# Patient Record
Sex: FEMALE | Race: WHITE | Employment: FULL TIME | URBAN - METROPOLITAN AREA
[De-identification: names, ages, dates, MRNs, and addresses within clinical notes are randomized per-mention and may not be internally consistent; named-entity substitution may affect disease eponyms.]

---

## 2017-03-17 ENCOUNTER — HOSPITAL ENCOUNTER (OUTPATIENT)
Dept: MRI IMAGING | Age: 47
Discharge: HOME | End: 2017-03-17
Attending: INTERNAL MEDICINE
Payer: COMMERCIAL

## 2017-03-17 DIAGNOSIS — R10.2 CHRONIC PELVIC PAIN IN FEMALE: ICD-10-CM

## 2017-03-17 DIAGNOSIS — G89.29 CHRONIC PELVIC PAIN IN FEMALE: ICD-10-CM

## 2017-03-17 DIAGNOSIS — M53.3 SACRAL PAIN: ICD-10-CM

## 2017-03-17 PROCEDURE — 72195 MRI PELVIS W/O DYE: CPT

## 2017-03-20 NOTE — PROGRESS NOTES
Spoke to patient, message relayed and understanding was expressed. Patient states that she is feeling the same, no better and no worse.

## 2017-03-31 ENCOUNTER — HOSPITAL ENCOUNTER (OUTPATIENT)
Dept: MAMMOGRAPHY | Age: 47
Discharge: HOME | End: 2017-03-31
Attending: FAMILY MEDICINE
Payer: COMMERCIAL

## 2017-03-31 DIAGNOSIS — Z12.31 VISIT FOR SCREENING MAMMOGRAM: ICD-10-CM

## 2017-03-31 PROCEDURE — 77067 SCR MAMMO BI INCL CAD: CPT

## 2017-04-04 ENCOUNTER — HOSPITAL ENCOUNTER (OUTPATIENT)
Dept: PHYSICAL THERAPY | Age: 47
Discharge: HOME | End: 2017-04-04

## 2017-04-04 NOTE — PROGRESS NOTES
[facility]rapy [facility] at [facility]   [address], [facility] [address]  [phone]   [phone]    OUTPATIENT DAILY NOTE    NAME/AGE/GENDER: Amee Barney is a 48 y.o. female. DATE: 4/4/2017    Patient did not show for initial appointment today.      Faith Tony, PT

## 2018-03-06 ENCOUNTER — HOSPITAL ENCOUNTER (OUTPATIENT)
Dept: CT IMAGING | Age: 48
Discharge: HOME | End: 2018-03-06
Attending: INTERNAL MEDICINE
Payer: SELF-PAY

## 2018-03-06 DIAGNOSIS — Z91.89 CARDIOVASCULAR RISK FACTOR: ICD-10-CM

## 2018-03-06 PROCEDURE — 75571 CT HRT W/O DYE W/CA TEST: CPT

## 2018-03-06 NOTE — PROGRESS NOTES
Coronary calcium score is 0-good news. She does have 2 nodules or spots 3 mm and 2 mm of the right and left upper lobes of the lung-repeat CT in a year. She should quit tobacco is she smokes?

## 2018-03-06 NOTE — PROGRESS NOTES
Spoke to patient, message was relayed and understanding expressed. Patient states that she is aware of the spots, previously seen and was told that they may be from scar tissue from having several vouts of pneumonia.

## 2018-04-02 PROBLEM — R94.31 ABNORMAL EKG: Status: ACTIVE | Noted: 2018-04-02

## 2018-04-02 PROBLEM — R06.02 SHORTNESS OF BREATH: Status: ACTIVE | Noted: 2018-04-02

## 2018-09-19 ENCOUNTER — HOSPITAL ENCOUNTER (OUTPATIENT)
Dept: MAMMOGRAPHY | Age: 48
Discharge: HOME | End: 2018-09-19
Attending: OBSTETRICS & GYNECOLOGY
Payer: COMMERCIAL

## 2018-09-19 DIAGNOSIS — Z12.31 VISIT FOR SCREENING MAMMOGRAM: ICD-10-CM

## 2018-09-19 PROCEDURE — 77063 BREAST TOMOSYNTHESIS BI: CPT

## 2019-03-27 ENCOUNTER — HOSPITAL ENCOUNTER (OUTPATIENT)
Dept: CT IMAGING | Age: 49
Discharge: HOME | End: 2019-03-27
Attending: INTERNAL MEDICINE
Payer: COMMERCIAL

## 2019-03-27 DIAGNOSIS — R91.8 PULMONARY NODULES: ICD-10-CM

## 2019-03-27 PROCEDURE — 71250 CT THORAX DX C-: CPT

## 2019-03-27 NOTE — PROGRESS NOTES
CT scan shows lung spot is good-no concerns. A small kidney stone was seen of the left kidney. No concerns for stone however.

## 2019-04-05 ENCOUNTER — HOSPITAL ENCOUNTER (OUTPATIENT)
Dept: SURGERY | Age: 49
Discharge: HOME | End: 2019-04-05

## 2019-04-05 VITALS — BODY MASS INDEX: 31.18 KG/M2 | WEIGHT: 176 LBS | HEIGHT: 63 IN

## 2019-04-05 NOTE — PERIOP NOTES
Patient verified name, , and procedure. Type: 1a; abbreviated assessment per anesthesia guidelines  Labs per surgeon: none  Labs per anesthesia: none    Instructed pt that they will be notified via office/GI LAB for time of arrival to GI lab. Follow diet and prep instructions per office including NPO status. If patient has NOT received instructions from office patient is advised to call surgeon office, verbalizes understanding. Bath or shower the night before and the am of surgery with non-mositurizing soap. No lotions, oils, powders, cologne on skin. No make up, eye make up or jewelry. Wear loose fitting comfortable, clean clothing. Must have adult present in building the entire time . Medications for the day of procedure- cymbalta, prilosec and ativan if needed, patient to hold- diclofenac. The following discharge instructions reviewed with patient: medication given during procedure may cause drowsiness for several hours, therefore, do not drive or operate machinery for remainder of the day. You may not drink alcohol on the day of your procedure, please resume regular diet and activity unless otherwise directed. You may experience abdominal distention for several hours that is relieved by the passage of gas. Contact your physician if you have any of the following: fever or chills, severe abdominal pain or excessive amount of bleeding or a large amount when having a bowel movement.  Occasional specks of blood with bowel movement would not be unusual.

## 2019-04-10 ENCOUNTER — ANESTHESIA EVENT (OUTPATIENT)
Dept: ENDOSCOPY | Age: 49
End: 2019-04-10
Payer: COMMERCIAL

## 2019-04-10 RX ORDER — SODIUM CHLORIDE, SODIUM LACTATE, POTASSIUM CHLORIDE, CALCIUM CHLORIDE 600; 310; 30; 20 MG/100ML; MG/100ML; MG/100ML; MG/100ML
100 INJECTION, SOLUTION INTRAVENOUS CONTINUOUS
Status: CANCELLED | OUTPATIENT
Start: 2019-04-10

## 2019-04-11 ENCOUNTER — ANESTHESIA (OUTPATIENT)
Dept: ENDOSCOPY | Age: 49
End: 2019-04-11
Payer: COMMERCIAL

## 2019-04-11 ENCOUNTER — HOSPITAL ENCOUNTER (OUTPATIENT)
Age: 49
Setting detail: OUTPATIENT SURGERY
Discharge: HOME | End: 2019-04-11
Attending: SURGERY | Admitting: SURGERY
Payer: COMMERCIAL

## 2019-04-11 VITALS
HEIGHT: 63 IN | DIASTOLIC BLOOD PRESSURE: 60 MMHG | OXYGEN SATURATION: 98 % | HEART RATE: 64 BPM | SYSTOLIC BLOOD PRESSURE: 108 MMHG | RESPIRATION RATE: 15 BRPM | TEMPERATURE: 97.8 F | WEIGHT: 176.6 LBS | BODY MASS INDEX: 31.29 KG/M2

## 2019-04-11 PROBLEM — K21.9 GASTROESOPHAGEAL REFLUX DISEASE WITHOUT ESOPHAGITIS: Status: ACTIVE | Noted: 2019-04-11

## 2019-04-11 PROBLEM — Z12.11 ENCOUNTER FOR SCREENING COLONOSCOPY: Status: ACTIVE | Noted: 2019-04-11

## 2019-04-11 LAB — HCG UR QL: NEGATIVE

## 2019-04-11 PROCEDURE — 74011250637 HC RX REV CODE- 250/637: Performed by: ANESTHESIOLOGY

## 2019-04-11 PROCEDURE — 74011250636 HC RX REV CODE- 250/636: Performed by: ANESTHESIOLOGY

## 2019-04-11 PROCEDURE — 76040000025: Performed by: SURGERY

## 2019-04-11 PROCEDURE — 88312 SPECIAL STAINS GROUP 1: CPT

## 2019-04-11 PROCEDURE — 87081 CULTURE SCREEN ONLY: CPT

## 2019-04-11 PROCEDURE — 81025 URINE PREGNANCY TEST: CPT

## 2019-04-11 PROCEDURE — 88305 TISSUE EXAM BY PATHOLOGIST: CPT

## 2019-04-11 PROCEDURE — 77030009426 HC FCPS BIOP ENDOSC BSC -B: Performed by: SURGERY

## 2019-04-11 PROCEDURE — 76060000032 HC ANESTHESIA 0.5 TO 1 HR: Performed by: SURGERY

## 2019-04-11 PROCEDURE — 74011250636 HC RX REV CODE- 250/636

## 2019-04-11 RX ORDER — PROPOFOL 10 MG/ML
INJECTION, EMULSION INTRAVENOUS
Status: DISCONTINUED | OUTPATIENT
Start: 2019-04-11 | End: 2019-04-11 | Stop reason: HOSPADM

## 2019-04-11 RX ORDER — SODIUM CHLORIDE, SODIUM LACTATE, POTASSIUM CHLORIDE, CALCIUM CHLORIDE 600; 310; 30; 20 MG/100ML; MG/100ML; MG/100ML; MG/100ML
100 INJECTION, SOLUTION INTRAVENOUS CONTINUOUS
Status: DISCONTINUED | OUTPATIENT
Start: 2019-04-11 | End: 2019-04-11 | Stop reason: HOSPADM

## 2019-04-11 RX ORDER — FAMOTIDINE 20 MG/1
20 TABLET, FILM COATED ORAL AS NEEDED
Status: COMPLETED | OUTPATIENT
Start: 2019-04-11 | End: 2019-04-11

## 2019-04-11 RX ORDER — PROPOFOL 10 MG/ML
INJECTION, EMULSION INTRAVENOUS AS NEEDED
Status: DISCONTINUED | OUTPATIENT
Start: 2019-04-11 | End: 2019-04-11 | Stop reason: HOSPADM

## 2019-04-11 RX ADMIN — FAMOTIDINE 20 MG: 20 TABLET ORAL at 07:18

## 2019-04-11 RX ADMIN — SODIUM CHLORIDE, SODIUM LACTATE, POTASSIUM CHLORIDE, AND CALCIUM CHLORIDE 100 ML/HR: 600; 310; 30; 20 INJECTION, SOLUTION INTRAVENOUS at 07:24

## 2019-04-11 RX ADMIN — PROPOFOL 160 MCG/KG/MIN: 10 INJECTION, EMULSION INTRAVENOUS at 07:42

## 2019-04-11 RX ADMIN — SODIUM CHLORIDE, SODIUM LACTATE, POTASSIUM CHLORIDE, AND CALCIUM CHLORIDE: 600; 310; 30; 20 INJECTION, SOLUTION INTRAVENOUS at 07:37

## 2019-04-11 RX ADMIN — PROPOFOL 40 MG: 10 INJECTION, EMULSION INTRAVENOUS at 07:42

## 2019-04-11 RX ADMIN — PROPOFOL 20 MG: 10 INJECTION, EMULSION INTRAVENOUS at 07:49

## 2019-04-11 RX ADMIN — PROPOFOL 20 MG: 10 INJECTION, EMULSION INTRAVENOUS at 07:45

## 2019-04-11 NOTE — ANESTHESIA PREPROCEDURE EVALUATION
Anesthetic History No history of anesthetic complications Review of Systems / Medical History Patient summary reviewed, nursing notes reviewed and pertinent labs reviewed Pulmonary Within defined limits Neuro/Psych Within defined limits Cardiovascular Hypertension: well controlled Exercise tolerance: >4 METS 
  
GI/Hepatic/Renal 
  
GERD: well controlled Endo/Other Obesity Other Findings Comments: Breast mass Physical Exam 
 
Airway Mallampati: I 
TM Distance: 4 - 6 cm Neck ROM: normal range of motion Mouth opening: Normal 
 
 Cardiovascular Regular rate and rhythm,  S1 and S2 normal,  no murmur, click, rub, or gallop Rhythm: regular Rate: normal 
 
 
 
 Dental 
 
Dentition: Lower braces and Upper braces Pulmonary Breath sounds clear to auscultation Abdominal 
 
 
 
 Other Findings Anesthetic Plan ASA: 2 Anesthesia type: total IV anesthesia Induction: Intravenous Anesthetic plan and risks discussed with: Patient and Spouse

## 2019-04-11 NOTE — DISCHARGE INSTRUCTIONS
Gastrointestinal Esophagogastroduodenoscopy (EGD)  Upper Exam Discharge Instructions    1. Call your doctor for any problems or questions. 2. Contact the doctor's office for follow up appointment as directed. 3. Medication may cause drowsiness for several hours, therefore, do not drive or operate machinery for remainder of the day. 4. No alcohol today. 5. Ordinarily, you may resume regular diet and activity after exam unless otherwise specified by your physician. 6. For mild soreness in your throat you may use Cepacol throat lozenges or warm salt-water gargles as needed.

## 2019-04-11 NOTE — H&P
Fabrice Gonzalez 4/11/2019 Date of Admission:  4/11/2019 Subjective:  
 
Patient is a 46 y.o.  female presents for screening colonoscopy. The patient reports no problems. The patient denies family history of colon cancer. The patient has had a colonoscopy in the past. Her last colonoscopy was at [facility] 10 years ago for IBS. She states it was normal. Otherwise there is no reported rectal bleeding or melena. No changes in appetite or unusual wt loss. No abdominal pain or bloating. No reported changes in bowel habits. She has history of GERD for several years dating back to the loss of her son. She takes Prilosec which controls her symptoms. She complains of epigastric abdominal pain daily and thinks she may have an ulcer. She denies previous history of ulcers. Denies associated N/V, hematemesis or gastritis. Today we will add an EGD. Patient Active Problem List  
 Diagnosis Date Noted  Gastroesophageal reflux disease without esophagitis 04/11/2019  Encounter for screening colonoscopy 04/11/2019  Agatston coronary artery calcium score less than 100  Pulmonary nodules  Obesity, Class I, BMI 30-34.9  Abnormal EKG 04/02/2018  Shortness of breath 04/02/2018  Essential hypertension  BMI 29.0-29.9,adult  Sacral pain  Panic anxiety syndrome  Atypical ductal hyperplasia of breast 06/09/2014 Past Medical History:  
Diagnosis Date  Agatston coronary artery calcium score less than 100 03/2018 CAC 0  
 Chronic Sacral pain   
 managed with diclofenac and cymbalta  Essential hypertension Managed with meds  Family history of breast cancer  Obesity, Class I, BMI 30-34.9  Panic anxiety syndrome Ativan as needed  PUD (peptic ulcer disease)   
 managed with Prilosec  Pulmonary nodules 03/2018  
 3 mm right upper lobe nodule and 2 mm left upper lobe nodule. Per pt no growth.  Right breast biopsy 6/2014, 7/2014 Past Surgical History:  
Procedure Laterality Date  HX BREAST BIOPSY  2014  HX BREAST BIOPSY  2014 RIGHT BREAST NEEDLE LOCALIZED BIOPSY X2    performed by Jeffery Rayo MD at [address] [facility] HX HYSTEROSCOPY WITH ENDOMETRIAL ABLATION  2018 [address] [facility]  US GUIDED CORE BREAST BIOPSY Right 2014  
 6:00 position Fragments of fibroadenoma, simple  US GUIDED CORE BREAST BIOPSY Right 2014  
 7:00 pisition: Fibrocystic mastopathy having moderate intraductal hyperplasia, apocrine metaplasia and microcalcification. Prior to Admission Medications Prescriptions Last Dose Informant Patient Reported? Taking? DULoxetine (CYMBALTA) 60 mg capsule 4/10/2019 at Unknown time  No Yes Sig: Take 1 Cap by mouth daily. LORazepam (ATIVAN) 1 mg tablet 4/10/2019 at Unknown time  Yes Yes Sig: Take 1 mg by mouth daily as needed. Omeprazole delayed release (PRILOSEC D/R) 20 mg tablet 3/11/2019 at Unknown time  No Yes Sig: Take 1 Tab by mouth daily. diclofenac EC (VOLTAREN) 75 mg EC tablet 2019 at Unknown time  Yes Yes Sig: Take 75 mg by mouth daily. losartan-hydroCHLOROthiazide (HYZAAR) 50-12.5 mg per tablet 4/10/2019 at Unknown time  No Yes Sig: Take 1 Tab by mouth daily. Facility-Administered Medications: None Allergies Allergen Reactions  Citalopram Nausea and Vomiting  Bupropion Hcl Other (comments) Social History Tobacco Use  Smoking status: Former Smoker Packs/day: 0.50 Years: 6.00 Pack years: 3.00 Types: Cigarettes Last attempt to quit: 1999 Years since quittin.1  Smokeless tobacco: Never Used Substance Use Topics  Alcohol use: No  
  
Social History Social History Narrative 16 yr old son  in a MVA 2017. She has a 32 yr old daughter. She attends  [employer]. Family History Problem Relation Age of Onset  Heart Disease Father  Cancer Mother  Breast Cancer Mother 67  Breast Cancer Maternal Grandmother 80  Hypertension Brother  Breast Cancer Maternal Aunt 58 Current Facility-Administered Medications Medication Dose Route Frequency  lactated Ringers infusion  100 mL/hr IntraVENous CONTINUOUS Review of Systems A comprehensive review of systems was negative except for that written in the HPI. Objective:  
 
Vitals:  
 04/11/19 0705 BP: 147/80 Pulse: 80 Resp: 16 Temp: 97.8 °F (36.6 °C) SpO2: 98% Weight: 176 lb 9.6 oz (80.1 kg) Height: 5' 3\" (1.6 m) PHYSICAL EXAM  
Physical Examination: General appearance - alert, well appearing, and in no distress Mental status - alert, oriented to person, place, and time Eyes - pupils equal and reactive, extraocular eye movements intact Nose - normal and patent, no erythema, discharge or polyps Neck - supple, no significant adenopathy Chest - clear to auscultation, no wheezes, rales or rhonchi, symmetric air entry Heart - normal rate, regular rhythm, normal S1, S2, no murmurs, rubs, clicks or gallops Abdomen - soft, nontender, nondistended, no masses or organomegaly Neurological - alert, oriented, normal speech, no focal findings or movement disorder noted Musculoskeletal - no joint tenderness, deformity or swelling Extremities - peripheral pulses normal, no pedal edema, no clubbing or cyanosis Skin - normal coloration and turgor, no rashes, no suspicious skin lesions noted No results for input(s): WBC, HGB, HCT, PLT, HGBEXT, HCTEXT, PLTEXT in the last 72 hours. No results for input(s): NA, K, CL, GLU, CO2, BUN, CREA, MG, PHOS, TROIQ, INR, BNPP in the last 72 hours. No lab exists for component: TROIP, INREXT No results for input(s): PH, PCO2, PO2, HCO3 in the last 72 hours. Assessment:  
 
Hospital Problems  Date Reviewed: 4/11/2019 Codes Class Noted POA Gastroesophageal reflux disease without esophagitis ICD-10-CM: K21.9 ICD-9-CM: 530.81  4/11/2019 Unknown * (Principal) Encounter for screening colonoscopy ICD-10-CM: Z12.11 ICD-9-CM: V76.51  4/11/2019 Unknown Plan:  
Screening colonoscopy and EGD.  
 
 
Celia Browning, DO

## 2019-04-11 NOTE — PROCEDURES
Esophagogastroduodenoscopy Procedure Note      Patient: Maricarmen West MRN: [id number]     YOB: 1970  Age: 46 y.o. Sex: female           Procedure in Detail:  Informed consent was obtained for the procedure, the patient was brought to the endoscopy suite and sedation was induced by anesthesia. The [id number] gastroscope was inserted into the mouth and advanced under direct vision to second portion of the duodenum. A careful inspection was made as the gastroscope was withdrawn, including a retroflexed view of the proximal stomach; findings and interventions are described below, with appropriate photodocumentation obtained. Findings:   Oropharynx:   Normal  Esophagus:    Normal  Cardia of the stomach:    Normal  Body of the stomach:    Normal  Fundus of the stomach:    Normal  Antrum of the stomach:      - Excavated lesions:     - Erosions  Duodenum:    Normal  Anastomosis:      Normal        Therapies:    biopsy of stomach antrum    EBL-  minimal    Specimens: Specimens were collected and sent to pathology. Complications:   None; patient tolerated the procedure well. Recommendations:  - Continue acid suppression.  - Await pathology. - Await ARSENIO test result and treat for Helicobacter pylori if positive. - Follow symptoms. Signed by: Shelbi Browning DO                         April 11, 2019   Procedure in Detail:  Informed consent was obtained for the procedure. The patient was placed in the left lateral decubitus position and sedation was induced by anesthesia. The [id number] was inserted into the rectum and advanced under direct vision to the cecum, which was identified by the ileocecal valve and appendiceal orifice. The quality of the colonic preparation was excellent. A careful inspection was made as the colonoscope was withdrawn, including a retroflexed view of the rectum; findings and interventions are described below.   Appropriate photodocumentation was obtained. Findings:   ANUS: Anal exam reveals no masses or hemorrhoids, sphincter tone is normal.   RECTUM: Rectal exam reveals no masses or hemorrhoids. SIGMOID COLON: The mucosa is normal with good vascular pattern and without ulcers, diverticula, and polyps. DESCENDING COLON: The mucosa is normal with good vascular pattern and without ulcers, diverticula, and polyps. SPLENIC FLEXURE: The splenic flexure is normal.   TRANSVERSE COLON: The mucosa is normal with good vascular pattern and without ulcers, diverticula, and polyps. HEPATIC FLEXURE: The hepatic flexure is normal.   ASCENDING COLON: The mucosa is normal with good vascular pattern and without ulcers, diverticula, and polyps. CECUM: The appendiceal orifice appears normal. The ileocecal valve appears normal.   TERMINAL ILEUM: The terminal ileum was not entered. Specimens: No specimens were collected. Complications: None; patient tolerated the procedure well. \    EBL - minimal    Recommendations:   - For colon cancer screening in this average-risk patient, colonoscopy may be repeated in 10 years.      Signed By: Amor Forbes DO                        April 11, 2019

## 2019-04-11 NOTE — ANESTHESIA POSTPROCEDURE EVALUATION
Procedure(s): 
COLONOSCOPY/ 27 
ESOPHAGOGASTRODUODENOSCOPY (EGD) ESOPHAGOGASTRODUODENAL (EGD) BIOPSY. total IV anesthesia Anesthesia Post Evaluation Multimodal analgesia: multimodal analgesia not used between 6 hours prior to anesthesia start to PACU discharge Patient location during evaluation: bedside Patient participation: complete - patient participated Level of consciousness: awake and alert Pain management: adequate Airway patency: patent Anesthetic complications: no 
Cardiovascular status: hemodynamically stable Respiratory status: spontaneous ventilation Hydration status: euvolemic Comments: Patient stable and may discharge at this time. Vitals Value Taken Time /60 4/11/2019  8:33 AM  
Temp Pulse 64 4/11/2019  8:33 AM  
Resp 15 4/11/2019  8:33 AM  
SpO2 98 % 4/11/2019  8:33 AM

## 2019-04-12 LAB
H PYLORI AG TISS QL IMSTN: NEGATIVE
H PYLORI AG TISS QL IMSTN: NEGATIVE

## 2019-09-20 PROBLEM — Z12.11 ENCOUNTER FOR SCREENING COLONOSCOPY: Status: RESOLVED | Noted: 2019-04-11 | Resolved: 2019-09-20

## 2020-04-29 PROBLEM — R06.02 SHORTNESS OF BREATH: Status: RESOLVED | Noted: 2018-04-02 | Resolved: 2020-04-29

## 2020-10-22 PROBLEM — R94.31 ABNORMAL EKG: Status: RESOLVED | Noted: 2018-04-02 | Resolved: 2020-10-22

## 2020-12-12 ENCOUNTER — HOSPITAL ENCOUNTER (OUTPATIENT)
Dept: MAMMOGRAPHY | Age: 50
Discharge: HOME | End: 2020-12-12
Attending: OBSTETRICS & GYNECOLOGY
Payer: COMMERCIAL

## 2020-12-12 DIAGNOSIS — Z12.31 ENCOUNTER FOR SCREENING MAMMOGRAM FOR MALIGNANT NEOPLASM OF BREAST: ICD-10-CM

## 2020-12-12 PROCEDURE — 77063 BREAST TOMOSYNTHESIS BI: CPT

## 2020-12-14 ENCOUNTER — HOSPITAL ENCOUNTER (OUTPATIENT)
Dept: GENERAL RADIOLOGY | Age: 50
Discharge: HOME | End: 2020-12-14
Payer: COMMERCIAL

## 2020-12-14 ENCOUNTER — HOSPITAL ENCOUNTER (OUTPATIENT)
Dept: GENERAL RADIOLOGY | Age: 50
End: 2020-12-14
Payer: COMMERCIAL

## 2020-12-14 DIAGNOSIS — E55.9 HYPOVITAMINOSIS D: ICD-10-CM

## 2020-12-14 DIAGNOSIS — M53.3 SACRAL PAIN: ICD-10-CM

## 2020-12-14 DIAGNOSIS — R74.8 ELEVATED CK: ICD-10-CM

## 2020-12-14 DIAGNOSIS — R53.83 OTHER FATIGUE: ICD-10-CM

## 2020-12-14 DIAGNOSIS — M15.9 GENERALIZED OSTEOARTHRITIS: ICD-10-CM

## 2020-12-14 DIAGNOSIS — M79.10 MYALGIA: ICD-10-CM

## 2020-12-14 PROCEDURE — 72100 X-RAY EXAM L-S SPINE 2/3 VWS: CPT

## 2020-12-21 ENCOUNTER — HOSPITAL ENCOUNTER (OUTPATIENT)
Dept: MAMMOGRAPHY | Age: 50
Discharge: HOME | End: 2020-12-21
Attending: OBSTETRICS & GYNECOLOGY
Payer: COMMERCIAL

## 2020-12-21 DIAGNOSIS — R92.8 ABNORMAL SCREENING MAMMOGRAM: ICD-10-CM

## 2020-12-21 PROCEDURE — 77065 DX MAMMO INCL CAD UNI: CPT

## 2021-12-29 ENCOUNTER — TRANSCRIBE ORDER (OUTPATIENT)
Dept: SCHEDULING | Age: 51
End: 2021-12-29

## 2021-12-29 DIAGNOSIS — Z12.31 VISIT FOR SCREENING MAMMOGRAM: Primary | ICD-10-CM

## 2022-01-07 ENCOUNTER — HOSPITAL ENCOUNTER (OUTPATIENT)
Dept: ULTRASOUND IMAGING | Age: 52
Discharge: HOME | End: 2022-01-07
Attending: INTERNAL MEDICINE
Payer: COMMERCIAL

## 2022-01-07 DIAGNOSIS — R10.11 RIGHT UPPER QUADRANT ABDOMINAL PAIN: ICD-10-CM

## 2022-01-07 PROCEDURE — 76700 US EXAM ABDOM COMPLETE: CPT

## 2022-01-08 ENCOUNTER — HOSPITAL ENCOUNTER (OUTPATIENT)
Dept: MAMMOGRAPHY | Age: 52
Discharge: HOME | End: 2022-01-08
Attending: OBSTETRICS & GYNECOLOGY
Payer: COMMERCIAL

## 2022-01-08 DIAGNOSIS — Z12.31 VISIT FOR SCREENING MAMMOGRAM: ICD-10-CM

## 2022-01-08 PROCEDURE — 77063 BREAST TOMOSYNTHESIS BI: CPT

## 2022-01-10 NOTE — PROGRESS NOTES
Abdominal ultrasound report reviewed and shows a gallstone which could cause intermittent pain,  No definite evidence of inflammation however the imaging can be misleading.   If pain persists would recommend surgical consultation

## 2022-01-13 ENCOUNTER — HOSPITAL ENCOUNTER (OUTPATIENT)
Dept: MAMMOGRAPHY | Age: 52
Discharge: HOME | End: 2022-01-13
Attending: OBSTETRICS & GYNECOLOGY
Payer: COMMERCIAL

## 2022-01-13 DIAGNOSIS — R92.8 ABNORMAL SCREENING MAMMOGRAM: ICD-10-CM

## 2022-01-13 PROCEDURE — 77065 DX MAMMO INCL CAD UNI: CPT

## 2022-01-14 ENCOUNTER — HOSPITAL ENCOUNTER (OUTPATIENT)
Dept: MAMMOGRAPHY | Age: 52
Discharge: HOME | End: 2022-01-14
Attending: OBSTETRICS & GYNECOLOGY
Payer: COMMERCIAL

## 2022-01-14 VITALS
OXYGEN SATURATION: 97 % | TEMPERATURE: 98.2 F | SYSTOLIC BLOOD PRESSURE: 136 MMHG | HEART RATE: 75 BPM | DIASTOLIC BLOOD PRESSURE: 63 MMHG

## 2022-01-14 DIAGNOSIS — R92.8 ABNORMAL MAMMOGRAM OF LEFT BREAST: ICD-10-CM

## 2022-01-14 PROCEDURE — 19081 BX BREAST 1ST LESION STRTCTC: CPT

## 2022-01-14 PROCEDURE — 74011000250 HC RX REV CODE- 250: Performed by: OBSTETRICS & GYNECOLOGY

## 2022-01-14 PROCEDURE — 88305 TISSUE EXAM BY PATHOLOGIST: CPT

## 2022-01-14 PROCEDURE — 74011250636 HC RX REV CODE- 250/636: Performed by: OBSTETRICS & GYNECOLOGY

## 2022-01-14 PROCEDURE — 77065 DX MAMMO INCL CAD UNI: CPT

## 2022-01-14 RX ORDER — LIDOCAINE HYDROCHLORIDE AND EPINEPHRINE 10; 10 MG/ML; UG/ML
10 INJECTION, SOLUTION INFILTRATION; PERINEURAL
Status: COMPLETED | OUTPATIENT
Start: 2022-01-14 | End: 2022-01-14

## 2022-01-14 RX ORDER — LIDOCAINE HYDROCHLORIDE AND EPINEPHRINE 10; 10 MG/ML; UG/ML
5 INJECTION, SOLUTION INFILTRATION; PERINEURAL
Status: COMPLETED | OUTPATIENT
Start: 2022-01-14 | End: 2022-01-14

## 2022-01-14 RX ORDER — LIDOCAINE HYDROCHLORIDE 10 MG/ML
5 INJECTION INFILTRATION; PERINEURAL
Status: COMPLETED | OUTPATIENT
Start: 2022-01-14 | End: 2022-01-14

## 2022-01-14 RX ADMIN — LIDOCAINE HYDROCHLORIDE,EPINEPHRINE BITARTRATE 4 MG: 10; .01 INJECTION, SOLUTION INFILTRATION; PERINEURAL at 09:15

## 2022-01-14 RX ADMIN — SODIUM CHLORIDE 250 ML: 900 INJECTION, SOLUTION INTRAVENOUS at 09:15

## 2022-01-14 RX ADMIN — LIDOCAINE HYDROCHLORIDE 2 ML: 10 INJECTION, SOLUTION INFILTRATION; PERINEURAL at 09:15

## 2022-01-14 RX ADMIN — LIDOCAINE HYDROCHLORIDE,EPINEPHRINE BITARTRATE 100 MG: 10; .01 INJECTION, SOLUTION INFILTRATION; PERINEURAL at 09:15

## 2022-01-18 ENCOUNTER — HOSPITAL ENCOUNTER (OUTPATIENT)
Dept: MRI IMAGING | Age: 52
Discharge: HOME | End: 2022-01-18
Attending: OBSTETRICS & GYNECOLOGY
Payer: COMMERCIAL

## 2022-01-18 DIAGNOSIS — C50.912 BREAST CANCER, LEFT (HCC): ICD-10-CM

## 2022-01-18 PROCEDURE — A9576 INJ PROHANCE MULTIPACK: HCPCS

## 2022-01-18 PROCEDURE — 74011250636 HC RX REV CODE- 250/636

## 2022-01-18 PROCEDURE — 77049 MRI BREAST C-+ W/CAD BI: CPT

## 2022-01-18 RX ORDER — SODIUM CHLORIDE 0.9 % (FLUSH) 0.9 %
10 SYRINGE (ML) INJECTION
Status: COMPLETED | OUTPATIENT
Start: 2022-01-18 | End: 2022-01-18

## 2022-01-18 RX ADMIN — GADOTERIDOL 17 ML: 279.3 INJECTION, SOLUTION INTRAVENOUS at 15:33

## 2022-01-18 RX ADMIN — Medication 10 ML: at 15:33

## 2022-01-19 PROBLEM — F11.99 OPIOID USE, UNSPECIFIED WITH UNSPECIFIED OPIOID-INDUCED DISORDER (HCC): Status: ACTIVE | Noted: 2022-01-19

## 2022-02-08 ENCOUNTER — PATIENT OUTREACH (OUTPATIENT)
Dept: CASE MANAGEMENT | Age: 52
End: 2022-02-08

## 2022-02-08 NOTE — PROGRESS NOTES
2022 saw pt today with Dr Cris Galarza  for new breast cancer visit. Patient here with spouse. Patient's mom  from breast cancer and her aunt had breast cancer. Plan will be for surgery followed by re visit with Dr. Cris Galarza after surgery. Will have an Oncotype on surgery specimen once complete. Dr. Cris Galarza discussed potential plan of care with patient based on Oncotype results after surgery. Patient has already stopped Estridol , no hot flashes or other symptoms since stopping. Met with Genetics on 21. Plans for bilateral mastectomy with reconstruction  surgery with Dr. Lu Ferguson, yet to be scheduled. . Has critical insurance forms that she will drop off at desk. Will follow up with patient after surgery.

## 2022-02-15 ENCOUNTER — HOSPITAL ENCOUNTER (OUTPATIENT)
Dept: NUCLEAR MEDICINE | Age: 52
Discharge: HOME | End: 2022-02-15
Attending: INTERNAL MEDICINE
Payer: COMMERCIAL

## 2022-02-15 DIAGNOSIS — R10.11 RIGHT UPPER QUADRANT ABDOMINAL PAIN: ICD-10-CM

## 2022-02-15 PROCEDURE — 78227 HEPATOBIL SYST IMAGE W/DRUG: CPT

## 2022-02-15 RX ORDER — KIT FOR THE PREPARATION OF TECHNETIUM TC 99M MEBROFENIN 45 MG/10ML
6 INJECTION, POWDER, LYOPHILIZED, FOR SOLUTION INTRAVENOUS
Status: COMPLETED | OUTPATIENT
Start: 2022-02-15 | End: 2022-02-15

## 2022-02-15 RX ADMIN — KIT FOR THE PREPARATION OF TECHNETIUM TC 99M MEBROFENIN 6.58 MILLICURIE: 45 INJECTION, POWDER, LYOPHILIZED, FOR SOLUTION INTRAVENOUS at 08:35

## 2022-02-15 NOTE — PROGRESS NOTES
I called and notified the pt of these results/recommendations. The pt verbalized understanding and is agreeable to the referral.  The referral was placed.

## 2022-02-15 NOTE — PROGRESS NOTES
HIDA Scan report reviewed and is abnormal.   Recommend consultation with General Surgery.  Please inform the patient

## 2022-03-04 ENCOUNTER — HOSPITAL ENCOUNTER (OUTPATIENT)
Dept: SURGERY | Age: 52
Discharge: HOME | End: 2022-03-04

## 2022-03-04 NOTE — PERIOP NOTES
Patient was found to have a recent positive Covid-19 history, the below timetable is used for the earliest OR date for their elective procedure. Patient states had respiratory symptoms including cough and/or dyspnea, was not hospitalized and not in the ICU. The date of the test was 2/28/22. The earliest OR date is 4/12/22. Will have charge nurse follow up with Dr. Kurt Pederson office on Monday since office is closed this afternoon. Respiratory Symptoms Hospitalized Need for ICU Earliest OR Date   No No No 4 weeks from test date   Yes No No 6 weeks from test date   Yes Yes No 8 weeks from date of discharge   Yes Yes Yes 12 weeks from date of discharge       The patient chart will be reviewed by anesthesia and the surgeon office will be notified with the above recommendation.

## 2022-03-07 RX ORDER — CEFAZOLIN SODIUM 1 G/3ML
2-3 INJECTION, POWDER, FOR SOLUTION INTRAMUSCULAR; INTRAVENOUS
Status: CANCELLED | OUTPATIENT
Start: 2022-03-07 | End: 2022-03-07

## 2022-03-09 NOTE — PERIOP NOTES
Dr. Josef Evans reviewed chart - history of covid on 02/28/22. No order received.  Ok to proceed with breast surgery on 04/07/22

## 2022-03-19 PROBLEM — K21.9 GASTROESOPHAGEAL REFLUX DISEASE WITHOUT ESOPHAGITIS: Status: ACTIVE | Noted: 2019-04-11

## 2022-03-19 PROBLEM — F11.99 OPIOID USE, UNSPECIFIED WITH UNSPECIFIED OPIOID-INDUCED DISORDER (HCC): Status: ACTIVE | Noted: 2022-01-19

## 2022-03-25 VITALS — WEIGHT: 184 LBS | HEIGHT: 63 IN | BODY MASS INDEX: 32.6 KG/M2

## 2022-03-25 RX ORDER — CYCLOBENZAPRINE HCL 10 MG
10 TABLET ORAL
COMMUNITY
End: 2022-04-07

## 2022-03-25 NOTE — PERIOP NOTES
Patient verified name and . Order for consent was found in EHR and matches case posting; patient verifies procedure. Type 2 surgery, PAT phone assessment complete. Orders were received. Labs per surgeon: None  Labs per anesthesia protocol: CBC, BMP 2022 results found in Care Everywhere and HGB 13.6, K+ 3.7 and Cr 0.81 within anesthesia guidelines. Patient answered medical/surgical history questions at their best of ability. All prior to admission medications documented in [facility]. Patient instructed to take the following medications the day of surgery according to anesthesia guidelines with a small sip of water: Ativan (if needed) and Pristiq On the day before surgery please take Acetaminophen 1000mg in the morning and then again before bed. You may substitute for Tylenol 650 mg. Hold all vitamins 7 days prior to surgery and NSAIDS 5 days prior to surgery. Prescription meds to hold:None        Patient instructed on the following:    > Arrive at A Entrance, time of arrival to be called the day before by 1700  > NPO after midnight including gum, mints, and ice chips  > Responsible adult must drive patient to the hospital, stay during surgery, and patient will need supervision 24 hours after anesthesia  > Use antibacterial soap in shower the night before surgery and on the morning of surgery  > All piercings must be removed prior to arrival.    > Leave all valuables (money and jewelry) at home but bring insurance card and ID on DOS.   > You may be required to pay a deductible or co-pay on the day of your procedure. You can pre-pay by calling [phone] if your surgery is at the [facility] or [phone] if your surgery is at the [facility]. > Do not wear make-up, nail polish, lotions, cologne, perfumes, powders, or oil on skin. Artificial nails are not permitted.

## 2022-03-31 ENCOUNTER — HOSPITAL ENCOUNTER (OUTPATIENT)
Dept: SURGERY | Age: 52
Discharge: HOME | End: 2022-03-31

## 2022-04-07 ENCOUNTER — ANESTHESIA (OUTPATIENT)
Dept: SURGERY | Age: 52
End: 2022-04-07
Payer: COMMERCIAL

## 2022-04-07 ENCOUNTER — HOSPITAL ENCOUNTER (OUTPATIENT)
Age: 52
Setting detail: OUTPATIENT SURGERY
Discharge: HOME | End: 2022-04-07
Attending: SURGERY | Admitting: SURGERY
Payer: COMMERCIAL

## 2022-04-07 ENCOUNTER — APPOINTMENT (OUTPATIENT)
Dept: NUCLEAR MEDICINE | Age: 52
End: 2022-04-07
Attending: SURGERY
Payer: COMMERCIAL

## 2022-04-07 ENCOUNTER — ANESTHESIA EVENT (OUTPATIENT)
Dept: SURGERY | Age: 52
End: 2022-04-07
Payer: COMMERCIAL

## 2022-04-07 VITALS
WEIGHT: 185 LBS | HEIGHT: 63 IN | SYSTOLIC BLOOD PRESSURE: 113 MMHG | HEART RATE: 96 BPM | OXYGEN SATURATION: 96 % | TEMPERATURE: 98.1 F | DIASTOLIC BLOOD PRESSURE: 58 MMHG | RESPIRATION RATE: 16 BRPM | BODY MASS INDEX: 32.78 KG/M2

## 2022-04-07 DIAGNOSIS — C50.912 DUCTAL CARCINOMA OF BREAST, LEFT (HCC): ICD-10-CM

## 2022-04-07 DIAGNOSIS — Z80.3 FAMILY HISTORY OF BREAST CANCER: ICD-10-CM

## 2022-04-07 DIAGNOSIS — N60.99 ATYPICAL DUCTAL HYPERPLASIA OF BREAST: ICD-10-CM

## 2022-04-07 LAB — HGB BLD-MCNC: 12.3 G/DL (ref 11.7–15.4)

## 2022-04-07 PROCEDURE — 77030040361 HC SLV COMPR DVT MDII -B: Performed by: SURGERY

## 2022-04-07 PROCEDURE — 77030010512 HC APPL CLP LIG J&J -C: Performed by: SURGERY

## 2022-04-07 PROCEDURE — 74011250637 HC RX REV CODE- 250/637: Performed by: ANESTHESIOLOGY

## 2022-04-07 PROCEDURE — 74011250636 HC RX REV CODE- 250/636: Performed by: ANESTHESIOLOGY

## 2022-04-07 PROCEDURE — 85018 HEMOGLOBIN: CPT

## 2022-04-07 PROCEDURE — 74011000250 HC RX REV CODE- 250: Performed by: NURSE ANESTHETIST, CERTIFIED REGISTERED

## 2022-04-07 PROCEDURE — 77030011267 HC ELECTRD BLD COVD -A: Performed by: SURGERY

## 2022-04-07 PROCEDURE — 77030008459 HC STPLR SKN COOP -B: Performed by: SURGERY

## 2022-04-07 PROCEDURE — 77030008462 HC STPLR SKN PROX J&J -A: Performed by: SURGERY

## 2022-04-07 PROCEDURE — 77030040506 HC DRN WND MDII -A: Performed by: SURGERY

## 2022-04-07 PROCEDURE — 77030009845 HC ONTMNT BACITRCN PATT -A: Performed by: SURGERY

## 2022-04-07 PROCEDURE — 77030031304 HC WAVWGD EIGR DISP INVO -D: Performed by: SURGERY

## 2022-04-07 PROCEDURE — 77030039425 HC BLD LARYNG TRULITE DISP TELE -A: Performed by: ANESTHESIOLOGY

## 2022-04-07 PROCEDURE — 76210000021 HC REC RM PH II 0.5 TO 1 HR: Performed by: SURGERY

## 2022-04-07 PROCEDURE — 77030011266 HC ELECTRD BLD INSL COVD -A: Performed by: SURGERY

## 2022-04-07 PROCEDURE — 38525 BIOPSY/REMOVAL LYMPH NODES: CPT | Performed by: SURGERY

## 2022-04-07 PROCEDURE — 74011250636 HC RX REV CODE- 250/636: Performed by: NURSE ANESTHETIST, CERTIFIED REGISTERED

## 2022-04-07 PROCEDURE — 2709999900 HC NON-CHARGEABLE SUPPLY: Performed by: SURGERY

## 2022-04-07 PROCEDURE — 76010000136 HC OR TIME 4.5 TO 5 HR: Performed by: SURGERY

## 2022-04-07 PROCEDURE — 74011000250 HC RX REV CODE- 250: Performed by: SURGERY

## 2022-04-07 PROCEDURE — 74011000636 HC RX REV CODE- 636: Performed by: SURGERY

## 2022-04-07 PROCEDURE — 77030002996 HC SUT SLK J&J -A: Performed by: SURGERY

## 2022-04-07 PROCEDURE — 77030002933 HC SUT MCRYL J&J -A: Performed by: SURGERY

## 2022-04-07 PROCEDURE — 77030002966 HC SUT PDS J&J -A: Performed by: SURGERY

## 2022-04-07 PROCEDURE — 77030037088 HC TUBE ENDOTRACH ORAL NSL COVD-A: Performed by: ANESTHESIOLOGY

## 2022-04-07 PROCEDURE — C1889 IMPLANT/INSERT DEVICE, NOC: HCPCS | Performed by: SURGERY

## 2022-04-07 PROCEDURE — 74011250636 HC RX REV CODE- 250/636: Performed by: SURGERY

## 2022-04-07 PROCEDURE — 76060000040 HC ANESTHESIA 4.5 TO 5 HR: Performed by: SURGERY

## 2022-04-07 PROCEDURE — 74011000250 HC RX REV CODE- 250: Performed by: ANESTHESIOLOGY

## 2022-04-07 PROCEDURE — 19303 MAST SIMPLE COMPLETE: CPT | Performed by: SURGERY

## 2022-04-07 PROCEDURE — 77030002916 HC SUT ETHLN J&J -A: Performed by: SURGERY

## 2022-04-07 PROCEDURE — 77030040504 HC DRN WND MDII -B: Performed by: SURGERY

## 2022-04-07 PROCEDURE — 76210000006 HC OR PH I REC 0.5 TO 1 HR: Performed by: SURGERY

## 2022-04-07 PROCEDURE — 77030020269 HC MISC IMPL: Performed by: SURGERY

## 2022-04-07 PROCEDURE — A9541 TC99M SULFUR COLLOID: HCPCS

## 2022-04-07 PROCEDURE — 77030018836 HC SOL IRR NACL ICUM -A: Performed by: SURGERY

## 2022-04-07 PROCEDURE — C1789 PROSTHESIS, BREAST, IMP: HCPCS | Performed by: SURGERY

## 2022-04-07 PROCEDURE — 77030026227 HC FUNL KELLR 2 PCH ALGN -C: Performed by: SURGERY

## 2022-04-07 PROCEDURE — 77030040922 HC BLNKT HYPOTHRM STRY -A: Performed by: ANESTHESIOLOGY

## 2022-04-07 PROCEDURE — 88307 TISSUE EXAM BY PATHOLOGIST: CPT

## 2022-04-07 PROCEDURE — 88304 TISSUE EXAM BY PATHOLOGIST: CPT

## 2022-04-07 DEVICE — GRAFT HUM TISS W16XL20CM THK0.4-2.4MM REGEN TISS MTRX PERF: Type: IMPLANTABLE DEVICE | Site: BREAST | Status: FUNCTIONAL

## 2022-04-07 RX ORDER — ROCURONIUM BROMIDE 10 MG/ML
INJECTION, SOLUTION INTRAVENOUS AS NEEDED
Status: DISCONTINUED | OUTPATIENT
Start: 2022-04-07 | End: 2022-04-07 | Stop reason: HOSPADM

## 2022-04-07 RX ORDER — SODIUM CHLORIDE, SODIUM LACTATE, POTASSIUM CHLORIDE, CALCIUM CHLORIDE 600; 310; 30; 20 MG/100ML; MG/100ML; MG/100ML; MG/100ML
25 INJECTION, SOLUTION INTRAVENOUS CONTINUOUS
Status: DISCONTINUED | OUTPATIENT
Start: 2022-04-07 | End: 2022-04-07 | Stop reason: HOSPADM

## 2022-04-07 RX ORDER — PROCHLORPERAZINE EDISYLATE 5 MG/ML
2.5 INJECTION INTRAMUSCULAR; INTRAVENOUS
Status: DISCONTINUED | OUTPATIENT
Start: 2022-04-07 | End: 2022-04-07 | Stop reason: HOSPADM

## 2022-04-07 RX ORDER — ONDANSETRON 2 MG/ML
INJECTION INTRAMUSCULAR; INTRAVENOUS AS NEEDED
Status: DISCONTINUED | OUTPATIENT
Start: 2022-04-07 | End: 2022-04-07 | Stop reason: HOSPADM

## 2022-04-07 RX ORDER — SODIUM CHLORIDE, SODIUM LACTATE, POTASSIUM CHLORIDE, CALCIUM CHLORIDE 600; 310; 30; 20 MG/100ML; MG/100ML; MG/100ML; MG/100ML
100 INJECTION, SOLUTION INTRAVENOUS CONTINUOUS
Status: DISCONTINUED | OUTPATIENT
Start: 2022-04-07 | End: 2022-04-07 | Stop reason: HOSPADM

## 2022-04-07 RX ORDER — CEFAZOLIN SODIUM/WATER 2 G/20 ML
2 SYRINGE (ML) INTRAVENOUS ONCE
Status: COMPLETED | OUTPATIENT
Start: 2022-04-07 | End: 2022-04-07

## 2022-04-07 RX ORDER — GLYCOPYRROLATE 0.2 MG/ML
INJECTION INTRAMUSCULAR; INTRAVENOUS AS NEEDED
Status: DISCONTINUED | OUTPATIENT
Start: 2022-04-07 | End: 2022-04-07 | Stop reason: HOSPADM

## 2022-04-07 RX ORDER — METHYLENE BLUE 10 MG/ML
INJECTION INTRAVENOUS AS NEEDED
Status: DISCONTINUED | OUTPATIENT
Start: 2022-04-07 | End: 2022-04-07 | Stop reason: HOSPADM

## 2022-04-07 RX ORDER — KETAMINE HYDROCHLORIDE 10 MG/ML
10 INJECTION, SOLUTION INTRAMUSCULAR; INTRAVENOUS AS NEEDED
Status: DISCONTINUED | OUTPATIENT
Start: 2022-04-07 | End: 2022-04-07 | Stop reason: HOSPADM

## 2022-04-07 RX ORDER — DEXAMETHASONE SODIUM PHOSPHATE 4 MG/ML
INJECTION, SOLUTION INTRA-ARTICULAR; INTRALESIONAL; INTRAMUSCULAR; INTRAVENOUS; SOFT TISSUE AS NEEDED
Status: DISCONTINUED | OUTPATIENT
Start: 2022-04-07 | End: 2022-04-07 | Stop reason: HOSPADM

## 2022-04-07 RX ORDER — EPHEDRINE SULFATE/0.9% NACL/PF 50 MG/5 ML
SYRINGE (ML) INTRAVENOUS AS NEEDED
Status: DISCONTINUED | OUTPATIENT
Start: 2022-04-07 | End: 2022-04-07 | Stop reason: HOSPADM

## 2022-04-07 RX ORDER — PROPOFOL 10 MG/ML
INJECTION, EMULSION INTRAVENOUS AS NEEDED
Status: DISCONTINUED | OUTPATIENT
Start: 2022-04-07 | End: 2022-04-07 | Stop reason: HOSPADM

## 2022-04-07 RX ORDER — VANCOMYCIN HYDROCHLORIDE 1 G/20ML
INJECTION, POWDER, LYOPHILIZED, FOR SOLUTION INTRAVENOUS AS NEEDED
Status: DISCONTINUED | OUTPATIENT
Start: 2022-04-07 | End: 2022-04-07 | Stop reason: HOSPADM

## 2022-04-07 RX ORDER — LIDOCAINE HYDROCHLORIDE AND EPINEPHRINE 10; 10 MG/ML; UG/ML
INJECTION, SOLUTION INFILTRATION; PERINEURAL AS NEEDED
Status: DISCONTINUED | OUTPATIENT
Start: 2022-04-07 | End: 2022-04-07 | Stop reason: HOSPADM

## 2022-04-07 RX ORDER — GENTAMICIN SULFATE 40 MG/ML
INJECTION, SOLUTION INTRAMUSCULAR; INTRAVENOUS AS NEEDED
Status: DISCONTINUED | OUTPATIENT
Start: 2022-04-07 | End: 2022-04-07 | Stop reason: HOSPADM

## 2022-04-07 RX ORDER — FENTANYL CITRATE 50 UG/ML
INJECTION, SOLUTION INTRAMUSCULAR; INTRAVENOUS AS NEEDED
Status: DISCONTINUED | OUTPATIENT
Start: 2022-04-07 | End: 2022-04-07 | Stop reason: HOSPADM

## 2022-04-07 RX ORDER — HYDROMORPHONE HYDROCHLORIDE 2 MG/ML
0.5 INJECTION, SOLUTION INTRAMUSCULAR; INTRAVENOUS; SUBCUTANEOUS
Status: DISCONTINUED | OUTPATIENT
Start: 2022-04-07 | End: 2022-04-07 | Stop reason: HOSPADM

## 2022-04-07 RX ORDER — MIDAZOLAM HYDROCHLORIDE 1 MG/ML
2 INJECTION, SOLUTION INTRAMUSCULAR; INTRAVENOUS
Status: DISCONTINUED | OUTPATIENT
Start: 2022-04-07 | End: 2022-04-07 | Stop reason: HOSPADM

## 2022-04-07 RX ORDER — LIDOCAINE HYDROCHLORIDE 20 MG/ML
INJECTION, SOLUTION EPIDURAL; INFILTRATION; INTRACAUDAL; PERINEURAL AS NEEDED
Status: DISCONTINUED | OUTPATIENT
Start: 2022-04-07 | End: 2022-04-07 | Stop reason: HOSPADM

## 2022-04-07 RX ORDER — LIDOCAINE HYDROCHLORIDE 10 MG/ML
0.3 INJECTION INFILTRATION; PERINEURAL ONCE
Status: DISCONTINUED | OUTPATIENT
Start: 2022-04-07 | End: 2022-04-07 | Stop reason: HOSPADM

## 2022-04-07 RX ORDER — LIDOCAINE HYDROCHLORIDE 10 MG/ML
0.1 INJECTION INFILTRATION; PERINEURAL ONCE
Status: COMPLETED | OUTPATIENT
Start: 2022-04-07 | End: 2022-04-07

## 2022-04-07 RX ORDER — HEPARIN SODIUM 5000 [USP'U]/ML
5000 INJECTION, SOLUTION INTRAVENOUS; SUBCUTANEOUS ONCE
Status: COMPLETED | OUTPATIENT
Start: 2022-04-07 | End: 2022-04-07

## 2022-04-07 RX ORDER — NEOSTIGMINE METHYLSULFATE 1 MG/ML
INJECTION, SOLUTION INTRAVENOUS AS NEEDED
Status: DISCONTINUED | OUTPATIENT
Start: 2022-04-07 | End: 2022-04-07 | Stop reason: HOSPADM

## 2022-04-07 RX ORDER — OXYCODONE HYDROCHLORIDE 5 MG/1
10 TABLET ORAL
Status: COMPLETED | OUTPATIENT
Start: 2022-04-07 | End: 2022-04-07

## 2022-04-07 RX ORDER — BUPIVACAINE HYDROCHLORIDE 2.5 MG/ML
INJECTION, SOLUTION EPIDURAL; INFILTRATION; INTRACAUDAL AS NEEDED
Status: DISCONTINUED | OUTPATIENT
Start: 2022-04-07 | End: 2022-04-07 | Stop reason: HOSPADM

## 2022-04-07 RX ADMIN — ROCURONIUM BROMIDE 50 MG: 50 INJECTION, SOLUTION INTRAVENOUS at 13:22

## 2022-04-07 RX ADMIN — FENTANYL CITRATE 100 MCG: 50 INJECTION INTRAMUSCULAR; INTRAVENOUS at 13:22

## 2022-04-07 RX ADMIN — ONDANSETRON 4 MG: 2 INJECTION INTRAMUSCULAR; INTRAVENOUS at 13:31

## 2022-04-07 RX ADMIN — Medication 2 G: at 13:29

## 2022-04-07 RX ADMIN — Medication 10 MG: at 14:14

## 2022-04-07 RX ADMIN — Medication 10 MG: at 18:55

## 2022-04-07 RX ADMIN — HYDROMORPHONE HYDROCHLORIDE 0.5 MG: 2 INJECTION, SOLUTION INTRAMUSCULAR; INTRAVENOUS; SUBCUTANEOUS at 18:09

## 2022-04-07 RX ADMIN — FENTANYL CITRATE 50 MCG: 50 INJECTION INTRAMUSCULAR; INTRAVENOUS at 14:49

## 2022-04-07 RX ADMIN — PROPOFOL 200 MG: 10 INJECTION, EMULSION INTRAVENOUS at 13:22

## 2022-04-07 RX ADMIN — FENTANYL CITRATE 50 MCG: 50 INJECTION INTRAMUSCULAR; INTRAVENOUS at 14:03

## 2022-04-07 RX ADMIN — OXYCODONE 10 MG: 5 TABLET ORAL at 18:32

## 2022-04-07 RX ADMIN — Medication 10 MG: at 13:38

## 2022-04-07 RX ADMIN — LIDOCAINE HYDROCHLORIDE 0.1 ML: 10 INJECTION, SOLUTION INFILTRATION; PERINEURAL at 10:05

## 2022-04-07 RX ADMIN — Medication 10 MG: at 15:59

## 2022-04-07 RX ADMIN — SODIUM CHLORIDE, SODIUM LACTATE, POTASSIUM CHLORIDE, AND CALCIUM CHLORIDE: 600; 310; 30; 20 INJECTION, SOLUTION INTRAVENOUS at 13:13

## 2022-04-07 RX ADMIN — HYDROMORPHONE HYDROCHLORIDE 0.5 MG: 2 INJECTION, SOLUTION INTRAMUSCULAR; INTRAVENOUS; SUBCUTANEOUS at 18:06

## 2022-04-07 RX ADMIN — LIDOCAINE HYDROCHLORIDE 100 MG: 20 INJECTION, SOLUTION EPIDURAL; INFILTRATION; INTRACAUDAL; PERINEURAL at 13:22

## 2022-04-07 RX ADMIN — DEXAMETHASONE SODIUM PHOSPHATE 10 MG: 4 INJECTION, SOLUTION INTRAMUSCULAR; INTRAVENOUS at 13:31

## 2022-04-07 RX ADMIN — Medication 10 MG: at 16:35

## 2022-04-07 RX ADMIN — Medication 10 MG: at 13:32

## 2022-04-07 RX ADMIN — SODIUM CHLORIDE, SODIUM LACTATE, POTASSIUM CHLORIDE, AND CALCIUM CHLORIDE: 600; 310; 30; 20 INJECTION, SOLUTION INTRAVENOUS at 14:09

## 2022-04-07 RX ADMIN — SODIUM CHLORIDE, SODIUM LACTATE, POTASSIUM CHLORIDE, AND CALCIUM CHLORIDE 25 ML/HR: 600; 310; 30; 20 INJECTION, SOLUTION INTRAVENOUS at 10:05

## 2022-04-07 RX ADMIN — Medication 10 MG: at 15:18

## 2022-04-07 RX ADMIN — GLYCOPYRROLATE 0.4 MG: 0.2 INJECTION, SOLUTION INTRAMUSCULAR; INTRAVENOUS at 17:37

## 2022-04-07 RX ADMIN — Medication 2 G: at 17:29

## 2022-04-07 RX ADMIN — HEPARIN SODIUM 5000 UNITS: 5000 INJECTION, SOLUTION INTRAVENOUS; SUBCUTANEOUS at 10:06

## 2022-04-07 RX ADMIN — Medication 3 MG: at 17:37

## 2022-04-07 NOTE — PERIOP NOTES
Patient family members brought back for discharge teaching. Patient O2 and BP starts to decline. Patient family member gets light headed and tarts to stumble around PACU, This RN and Christopher Arevalo put patient family member on a stretcher and take vitals. Patient family member vitals WDL. Patient family member states he has panic attacks a lot> LYNDON Chairez offered to help patient family member to ER for checkup. Patient family member refused and is now with rest of family in waiting room.

## 2022-04-07 NOTE — PERIOP NOTES
Patient sat @ 97 % on room air. Patient BP has stabilized. Patient states she is now feeling better as well.  Per MD Giron patient is ok to go home

## 2022-04-07 NOTE — OP NOTES
Operative Report    Patient: Kayla Pizarro MRN: [id number]  SSN: [id number]    YOB: 1970  Age: 46 y.o. Sex: female       Date of Surgery: 4/7/2022     Preoperative Diagnosis:   1.  Left breast cancer  2.  Acquired absence of bilateral breasts for treatment of breast cancer.     Postoperative Diagnosis:   1.  Left breast cancer  2.  Acquired absence of bilateral breasts for treatment of breast cancer.     Surgeon:  Jean Cornejo MD     Assistant: Amber Falcon CST     Anesthesia: General      Procedure:   Bilateral immediate stage 1 breast reconstruction with tissue expanders and Alloderm.     Indication:  The patient presented to clinic following a diagnosis of breast cancer.  The decision was made to proceed with bilateral mastectomy. I reviewed the reconstructive options, risks, and benefits with the patient. She has elected to proceed with immediate staged reconstruction using implants with the first stage consisting of bilateral pre-pectoral tissue expander placement with acellular dermal matrix.  She understands the multistage nature of this approach and off-label use of ADM.  Written and verbal consent were obtained.     Procedure in Detail: The patient was taken to the operating room and placed in a supine position on the operating table.  She was placed under general anesthesia. Please see Dr. Melendez' operative note for details of his portion of the operation.       Following completion of his portion, a new timeout was performed.  The mastectomy pockets were inspected and hemostasis was ensured.  Next, the pockets were irrigated with antibiotic solution. Hemostasis was ensured. The anterior axillary lines were re-established via suture reconstruction.     The tissue expanders were prepared on the back table using sterile technique. The Alloderm was soaked per protocol and then rinsed with antibiotic solution.  The Lovely were expanded with air and then wrapped with Alloderm.  3-0 PDS was used to secure the ADM on the posterior aspect of the expander to create good contour without folds.  Excess ADM was trimmed to create excellent contour around the expanders and good coverage of the device.  The TE suture tabs were left exposed to allow for future TE inset.       The skin was then cleaned with betadine in preparation for TE/ADM placement.  The expander was then precisely placed pre-pectorally and secured via the tabs using 2-0 PDS.  Four suture tabs were secure on each expander to prevent rotation/movement of the expanders.     Two 15Fr bulb suction drains were then placed in each pocket.  The lateral most drain was place around the lateral and superior edge of the TE; the medial drain was placed along the IMF. These drains were secured with 2-0 nylon drain stitches in the usual fashion.     Following drain placement, the pocket was irrigated.  Hemostasis was again ensured.       The incisions were all then closed using 3-0 monocryl for the deep dermis followed by a running subcuticular 4-0 monocryl for the superficial skin edges.  The expanders were then expanded to 350cc of fluid, with care to avoid any tension on the mastectomy skin flaps.      A small amount of nitropaste was applied to both breast, per protocol. The breast incisions were then dressed with bacitracin, xeroform, and 4x4 gauze.  The drain sites were dressed with biopatches, telfa, and sealed with a tegaderm.  Gauze and ABD pads were then applied and secured with a loose-fitting surgical bra.   Care was taken to avoid any tension/pressure on the breasts.     The patient tolerated the procedure well.  The drains were activated and they held suction well.     Estimated Blood Loss:  Less than 5cc for the plastic surgery portion of the case.         Specimens: None for the plastic surgery portion of the case.     Drains: Two 15Fr bulb suction drains per pocket (4 total)     Counts: Sponge and needle counts were correct times two.     Complications: None     Condition: Stable to PACU.     Disposition: Patient will be discharge home when criteria is met.        Implants:   Implant Name Type Inv. Item Serial No.  Lot No. LRB No. Used Action   GRAFT HUM TISS [id number] THK0.4-2. 4MM REGEN TISS MTRX PERF - [id number]  GRAFT HUM TISS [id number] THK0.4-2. 4MM REGEN TISS [address] [id number] Left 1 Implanted   GRAFT HUM TISS [id number] THK0.4-2. 4MM REGEN TISS MTRX PERF - [id number]  GRAFT HUM TISS [id number] THK0.4-2. 4MM REGEN TISS Matthew Finer - [id number] Right 1 Implanted   Hensel Artoura Plus Smooth Breast Tissue Expander   [id number] MENTOR [id number] Left 1 Implanted   Hensel Artoura Smooth Breast Tissue Expander   [id number] MENTOR [id number] Right 1 Implanted                   Signed By:  Kasia Milligan MD     April 7, 2022

## 2022-04-07 NOTE — H&P
Mary Ann Seaman MD, [facility], [address] [place] [place] [address]  Jesus Manuel Bernal  Phone [phone]   Fax [phone]      Date of visit: 4/7/2022     Primary/Requesting provider: Erwin Vega MD    No chief complaint on file. Primary/Requesting provider: Erwin Vega MD          Chief Complaint   Patient presents with    Follow-up       discuss surgery          HISTORY OF PRESENT ILLNESS  uJan Putnam is a 46 y.o. female. HPI  Patient is a 46 y.o. female who presents for further discussion of her left breast cancer and surgical planning. Since our initial visit, she has decided to pursue bilateral mastectomy. She has met with plastics (Dr Tommie Forbes) and desires proceeding with immediate reconstruction. She also has met with genetics and declined testing (family history + for MGM, M, and M Aunt). Her oncologist is Dr Bentley Davenport.     Separately, she is having abdominal pain; prior to our initial meeting she had presented to the ER. She reports that at this point she has pain essentially constantly, and any oral intake seems to worsen it; fatty foods are the most prominent/worst.  The pain is isolated to the RUQ, radiating under the costal margin towards the flank. She has nausea, no emesis. She has had occasional postprandial diarrhea. She has not had fever, chills, emesis. She did not have pain production with Ensure-HIDA but did have nausea.     Medications:   Current Outpatient Medications   Medication Sig    desvenlafaxine succinate (PRISTIQ) 50 mg ER tablet Take 1 Tablet by mouth daily.  LORazepam (ATIVAN) 1 mg tablet Take 1 mg by mouth daily as needed.     losartan (COZAAR) 50 mg tablet TAKE 1 TABLET BY MOUTH DAILY    omeprazole (PRILOSEC) 20 mg capsule TABLET 1 CAPSULE BY MOUTH EVERY DAY    hydroCHLOROthiazide (HYDRODIURIL) 12.5 mg tablet TAKE 1 TABLET BY MOUTH DAILY    carisoprodoL (SOMA) 350 mg tablet Take 1 Tablet by mouth every eight (8) hours as needed for Muscle Spasm(s). Max Daily Amount: 1,050 mg.    estradioL (VIVELLE) 0.1 mg/24 hr 1 Patch by TransDERmal route two (2) times a week.  ergocalciferol (ERGOCALCIFEROL) 1,250 mcg (50,000 unit) capsule Take 1 Capsule by mouth two (2) days a week.      No current facility-administered medications for this visit.         Allergies: Allergies   Allergen Reactions    Citalopram Nausea and Vomiting    Bupropion Hcl Other (comments)    Fluoxetine Other (comments)       \"CRAZY THOUGHTS\"    Paroxetine Hcl Other (comments)       \"MADE SLEEPY\"         Past History:       Past Medical History:   Diagnosis Date    Agatston coronary artery calcium score less than 100 2018     CAC 0    Chronic Sacral pain      Depression with anxiety      Essential hypertension      Family history of breast cancer      Menopause      Obesity, Class I, BMI 30-34. 9      Pulmonary nodules 2018     3 mm right upper lobe nodule and 2 mm left upper lobe nodule. Per pt no growth.      Right breast biopsy 2014, 2014            Past Surgical History:   Procedure Laterality Date    COLONOSCOPY N/A 2019     COLONOSCOPY/ 27 performed by Mervat Barlow DO at [facility] HX BREAST BIOPSY   2014    HX BREAST BIOPSY   2014     RIGHT BREAST NEEDLE LOCALIZED BIOPSY X2    performed by Yesenia Ortiz MD at [address] [place] [address] HX BREAST BIOPSY Left 2022     stereo bx     HX  SECTION       HX HYSTERECTOMY   2021    HX HYSTEROSCOPY WITH ENDOMETRIAL ABLATION   2018    HX ENE AND BSO   2021     full hysterectomy    HX TUBAL LIGATION       US GUIDED CORE BREAST BIOPSY Right 2014     6:00 position Fragments of fibroadenoma, simple    US GUIDED CORE BREAST BIOPSY Right 2014     7:00 pisition: Fibrocystic mastopathy having moderate intraductal hyperplasia, apocrine metaplasia and microcalcification.         Family and Social History:        Family History   Problem Relation Age of Onset    Heart Disease Father      Heart Attack Father      Cancer Mother      Breast Cancer Mother 67    Breast Cancer Maternal Grandmother 80    Hypertension Brother      Breast Cancer Maternal Aunt 58      Social History      Socioeconomic History    Marital status:        Spouse name: Not on file    Number of children: 2    Years of education: Not on file    Highest education level: Not on file   Occupational History    Occupation: Finance, [employer]   Tobacco Use    Smoking status: Former Smoker       Packs/day: 0.50       Years: 6.00       Pack years: 3.00       Types: Cigarettes       Quit date: 1999       Years since quittin.0    Smokeless tobacco: Never Used   Vaping Use    Vaping Use: Never used   Substance and Sexual Activity    Alcohol use: No    Drug use: No    Sexual activity: Yes       Partners: Male       Birth control/protection: None, Surgical   Other Topics Concern    Not on file   Social History Narrative     16 yr old son  in a MVA 2017. She has a 32 yr old daughter. She attends  [employer].      Social Determinants of Health          Financial Resource Strain:     Difficulty of Paying Living Expenses: Not on file   Food Insecurity:     Worried About Running Out of Food in the Last Year: Not on file    Bryan of Food in the Last Year: Not on file   Transportation Needs:     Lack of Transportation (Medical): Not on file    Lack of Transportation (Non-Medical):  Not on file   Physical Activity:     Days of Exercise per Week: Not on file    Minutes of Exercise per Session: Not on file   Stress:     Feeling of Stress : Not on file   Social Connections:     Frequency of Communication with Friends and Family: Not on file    Frequency of Social Gatherings with Friends and Family: Not on file    Attends Orthodoxy Services: Not on file    Active Member of Clubs or Organizations: Not on file    Attends Club or Organization Meetings: Not on file    Marital Status: Not on file   Intimate Partner Violence:     Fear of Current or Ex-Partner: Not on file    Emotionally Abused: Not on file    Physically Abused: Not on file    Sexually Abused: Not on file   Housing Stability:     Unable to Pay for Housing in the Last Year: Not on file    Number of Ivonmojeanette in the Last Year: Not on file    Unstable Housing in the Last Year: Not on file       Review of Systems   Constitutional: Negative. HENT: Negative. Eyes: Negative. Respiratory: Negative. Cardiovascular: Negative. Gastrointestinal: Positive for abdominal pain. Genitourinary: Negative. Musculoskeletal: Negative. Skin: Negative. Neurological: Negative. Endo/Heme/Allergies: Negative. Psychiatric/Behavioral: Negative.          Physical Exam  Vitals and nursing note reviewed. Constitutional:       Appearance: She is well-developed. She is not toxic-appearing or diaphoretic. HENT:      Head: Normocephalic and atraumatic. Eyes:      General: No scleral icterus. Conjunctiva/sclera: Conjunctivae normal.      Pupils: Pupils are equal, round, and reactive to light. Neck:      Thyroid: No thyromegaly. Vascular: No JVD. Trachea: No tracheal deviation. Cardiovascular:      Rate and Rhythm: Normal rate and regular rhythm. Heart sounds: No murmur heard. No friction rub. No gallop. Pulmonary:      Effort: Pulmonary effort is normal. No respiratory distress. Breath sounds: Normal breath sounds. No wheezing or rales. Abdominal:      General: There is no distension. Palpations: Abdomen is soft. There is no mass. Tenderness: There is abdominal tenderness (RUQ subcostally). There is guarding. Hernia: No hernia is present. Musculoskeletal:      Cervical back: Normal range of motion. Comments: No gross deformities   Skin:     General: Skin is warm. Neurological:      General: No focal deficit present. Mental Status: She is alert and oriented to person, place, and time. Cranial Nerves: No cranial nerve deficit. Psychiatric:         Mood and Affect: Mood normal.         Behavior: Behavior normal. Behavior is cooperative. Thought Content: Thought content normal.         Judgment: Judgment normal.         US Results (most recent):  Results from Hospital Encounter encounter on 01/07/22     US ABD COMP     Narrative  Exam: US ABD COMP on 1/7/2022 3:32 PM     Clinical History: The Female patient is 46years old presenting for abdominal  pain.     Comparison: none     Findings:     Liver: Normal in size measuring 16.8 cm. Increased echotexture which diminishes  the sensitivity for detection of focal abnormality. No focal solid or cystic  lesions are demonstrated. Hepatopedal flow is demonstrated in the portal vein.     Gallbladder:  A 1.5 cm shadowing stone is demonstrated in the gallbladder lumen. There is no evidence of wall thickening or pericholecystic free fluid. Common  bile duct measures 6.5 mm.     Pancreas:  Pancreatic tail is not optimally visualized due to overlying bowel  gas, however the imaged head and body are unremarkable.     Right kidney: 10.2 cm normal in size and contour. There is no evidence of solid  or cystic lesion. No evidence of hydronephrosis is seen. There are no  shadowing stones.     Left kidney: 11.0 cm normal in size and contour. There is no evidence of solid  or cystic lesion. No evidence of hydronephrosis is seen. There are no  shadowing stones.     Spleen:  Unremarkable measuring 9.6 cm.     Aorta and Vena Cava: Visualized segments are normal in size without evidence of  aneurysm, or thrombosis.     Impression  1. Cholelithiasis. 2. Mildly prominent common bile duct.   3. Hepatic steatosis     Nuclear Medicine Results (most recent):  Results from [place] encounter on 02/15/22     NM HEPATOBILIARY W INTERVENTION     Narrative  NUCLEAR MEDICINE HEPATOBILIARY SCAN, 2/15/2022.     CLINICAL HISTORY: Right upper quadrant pain with nausea for 2 months.     Technique: Sequential planar images of the abdomen were obtained following the  uneventful intravenous administration of 6.58 mCi Tc 99m-Choletec. In addition,  8 ounces Ensure was administered at 55 minutes into the study.     FINDINGS:     Following the administration of radiotracer, there is prompt uptake by the liver  and clearance of cardiac blood pool activity. Early filling of the extra hepatic  bile ducts is seen at 10 minutes. Early filling of the gallbladder is seen at 10  minutes. Activity seen within small bowel loops at 15 minutes.     1 can Ensure was administered at 55 minutes into the study. The gallbladder  ejection fraction was measured to be 16% which is low. In addition, the patient  noted moderate reproduction of the patient's \"typical\" abdominal pain with the  administration of Ensure.     Impression  1. Normal filling of the gallbladder without findings to suggest acute  cholecystitis.     2. Low gallbladder ejection fraction and moderate reproduction of the patient's  \"typical\" abdominal pain with the administration of Ensure which suggest chronic  cholecystitis/biliary dyskinesia.        MRI Results (most recent):  Results from Hospital Encounter encounter on 01/18/22     MRI BREAST BI W WO CONT     Narrative  MRI of the Breasts with and without contrast     CLINICAL INDICATION:  New diagnosis of left breast cancer status post biopsy  demonstrating 3:00 infiltrating ductal carcinoma with associated DCIS. Evaluation for staging, extent of disease, therapy planning with  mammographically dense breast parenchyma. In 2014 she had right benign excisions  for 7:00 hyperplasia and 12:00 epithelial lesion.  Also supplemental screening  elsewhere in each breast given high risk, strong family history of breast cancer  including her mother, maternal grandmother and great aunt.     COMPARISON: Prior mammography 1/14/2022, 1/13/2022, 1/8/2022.     TECHNIQUE: Standard MRI sequences were obtained through the breasts in multiple  planes. Images were obtained before and after intravenous infusion of 17 mL of  Prohance contrast. Images were reviewed with PACS and with Lumetrics CAD software.     FINDINGS: The breasts demonstrate moderate-marked extensive bilateral  glandularity and background enhancement, with innumerable tiny foci scattered  throughout each breast, which can limit accuracy of MRI. Also noted are  innumerable small cysts scattered throughout each breast.     Left 3:00 mid depth demonstrates biopsy clip within an irregular heterogeneously  enhancing 2.1 x 1.3 x 2.0 cm malignant mass.     There is no other evidence of suspicious enhancing mass, and no dominant or  unique nonmass enhancement, to suggest malignancy elsewhere in either breast.  Tiny circumscribed reniform masses in the right lateral breast are compatible  with normal intramammary lymph nodes. There is no evidence of axillary or  internal mammary lymphadenopathy. Elsewhere, limited visualization of the  partially included thorax and upper abdomen shows no acute abnormality.     Impression  1. Left 3:00 breast cancer measures up to 2.1 cm.  2. No additional highly suspicious lesion in either breast. No evidence of  lymphadenopathy. 3. Marked extensive bilateral background enhancement which can limit accuracy of  MRI.     Recommend continued breast cancer management as directed clinically.     Also recommend bilateral breast MRI follow-up in one year to ensure stability  elsewhere, and for supplemental screening given her increased risk.     BI-RADS Assessment Category 6: Known Biopsy Proven Malignancy        ASSESSMENT and PLAN       Encounter Diagnoses   Name Primary?     Ductal carcinoma of breast, left ([facility] [address] Yes    Family history of breast cancer      Biliary dyskinesia           Her breast cancer surgery has been scheduled for 4/7, coordinating with Dr Jigna Shearer. Cholecystectomy can be done between now and that time and she wishes to proceed in that order.     Will proceed with cholecystectomy. Technical details of the procedure are reviewed, including the non-standard single incision (SILS) technique if indicated. Risks reviewed include anesthetic risks, bleeding, infection, visceral injury including bile leak, incomplete symptom resolution and persistent post-operative diarrhea as well as conversion to open technique. All questions are answered. -THIS WAS CANCELED     After discussion, we will proceed with  left simple mastectomy with sentinel node biopsy  , right prophylactic mastectomy . We have discussed the technical details of the procedure(s) in detail. Risks reviewed include anesthetic risks, bleeding, infection, wound healing problems and cosmetic abnormalities, need for further surgical intervention depending on pathology reports, lymphedema if axillary procedure planned, and recurrence. All questions are answered.

## 2022-04-07 NOTE — OP NOTES
Operative Note    Date of Surgery: 4/7/2022    Preoperative Diagnosis: Malignant neoplasm of left female breast, unspecified estrogen receptor status, unspecified site of breast ([facility] [address] [C50.912]  Estrogen receptor positive [Z17.0]     Postoperative Diagnosis: Malignant neoplasm of left female breast, unspecified estrogen receptor status, unspecified site of breast (HCC) [C50.912]  Estrogen receptor positive [Z17.0]     Surgeon(s):  Victor Hugo Swartz MD      Anesthesia: General    Procedure:     1. Bilateral skin and nipple-sparing total mastectomy  2. Left axillary sentinel lymph node biopsy    Indications:  As detailed in the H&P. Skin-sparing technique is planned to provide the patient with the benefit of less incisional pain and improved cosmesis after reconstruction. This technique is significantly more intricate than standard surgical techniques.         Procedure in Detail:    With informed consent obtained, and left axillary lymphoscintigraphy previously completed confirming mapping of left axillary sentinel node the patient was brought to the operating room and placed on the table in a supine position with adequate padding to all pressure points and the arm extended laterally.  After induction of anesthesia,  the chest was prepped and draped in the usual sterile manner.        Beginning on the right, prophylactic side a curved, lateral incision was made as marked by plastic surgery. A thin subcutaneous plane was developed across the entirety of the breast with cautery. Margins of dissection were the clavicle, costosternal junction and rectus fascia. The breast was then amputated off of the chest wall, leaving the pectoralis fascia, and laterally it was transected off of the skin and lateral margin of the pectoralis major and it was removed.   The wound was irrigated and hemostasis was achieved with cautery. The space was packed with damp gauze.     Attention was then turned to the left side where a similar slightly curved, lateral incision was made as marked by plastic surgery. A similar thin subcutaneous plane was developed across the entirety of the breast.  Margins of dissection were again the clavicle, costosternal junction and rectus fascia. The breast was then amputated off of the chest wall, to include the pectoralis fascia, and laterally it was transected off of the skin and lateral margin of the pectoralis major and it was removed.   The wound was irrigated and hemostasis was achieved with cautery. Attention was then directed to the sentinel node biopsy. The axilla proper was entered, careful sharp and blunt dissection was then used following visual and gamma probe cues to identify 3 axillary  nodes which were dissected from the surrounding tissues using clips to control lymphatic and vascular structures to the node, and each node was sent separately to pathology. Nodes were recorded with max ex-vivo count(s) of 1477, 3680, 2625cps.  The probe was then used to sweep the axilla and no further activity >10% of lowest node count was noted. The site was irrigated, confirmed hemostatic, and the mastectomy cavity was packed with damp gauze. Plastic surgery then began stage I reconstruction.           Signed By: Janessa Cobos MD     April 7, 2022                        Estimated Blood Loss: 75cc    Specimens:   ID Type Source Tests Collected by Time Destination   1 : Right Breast- Long stitch anterior 12 o'clock,Short stitch lateral anterior Preservative   Lashay Gaona MD 4/7/2022 1426 Pathology   2 : Left Breast- short stitch anterior retroareolar, long stitch lateral anterior Preservative   Lashay Gaona MD 4/7/2022 1505 Pathology   3 : [place] Node # 1: [id number] Nini Gaona MD 4/7/2022 1550 Pathology   4 : [place] Node #2: [id number] Nini Gaona MD 4/7/2022 1551 Pathology   5 : [place] Node #3: [id number] Nini Gaona MD 4/7/2022 3435 [facility] Pathology   6 : Additional Axillary Tissue Preservative   Maria R Ballard MD 4/7/2022 1555 Pathology        Signed By: Jaylyn Pemberton MD     April 7, 2022

## 2022-04-07 NOTE — ANESTHESIA PREPROCEDURE EVALUATION
Relevant Problems   NEUROLOGY   (+) Depression with anxiety      CARDIOVASCULAR   (+) Essential hypertension      GASTROINTESTINAL   (+) Gastroesophageal reflux disease without esophagitis       Anesthetic History   No history of anesthetic complications            Review of Systems / Medical History  Patient summary reviewed and pertinent labs reviewed    Pulmonary  Within defined limits                 Neuro/Psych         Psychiatric history     Cardiovascular    Hypertension              Exercise tolerance: >4 METS     GI/Hepatic/Renal                Endo/Other        Obesity     Other Findings              Physical Exam    Airway  Mallampati: I  TM Distance: 4 - 6 cm  Neck ROM: normal range of motion        Cardiovascular    Rhythm: regular  Rate: normal         Dental  No notable dental hx       Pulmonary  Breath sounds clear to auscultation               Abdominal         Other Findings            Anesthetic Plan    ASA: 2  Anesthesia type: general          Induction: Intravenous  Anesthetic plan and risks discussed with: Patient and Spouse

## 2022-04-07 NOTE — PROGRESS NOTES
Pt unable to maintain 02 saturation on RA. Pt appears pale with BP starting to decline to 88/45. Pt states she is still having 10/10 pain. md contacted. Verbal order for hemoglobin and ketamine.

## 2022-04-07 NOTE — ANESTHESIA POSTPROCEDURE EVALUATION
Procedure(s):  BILATERAL BREAST MASTECTOMY  LEFT SENTINEL NODE BIOPSY WITH LYMPHATIC MAPPING/NEOPROBE PREOP 0730/ LYMPHO 0900  BIALTERAL BREAST RECONSTRUCTION STAGE 1 WITH TISSUE EXPANDERS AND ADM.     general    Anesthesia Post Evaluation      Multimodal analgesia: multimodal analgesia used between 6 hours prior to anesthesia start to PACU discharge  Patient location during evaluation: PACU  Patient participation: complete - patient participated  Level of consciousness: awake and awake and alert  Pain management: adequate  Airway patency: patent  Anesthetic complications: no  Cardiovascular status: acceptable  Respiratory status: acceptable  Hydration status: acceptable  Post anesthesia nausea and vomiting:  controlled      INITIAL Post-op Vital signs:   Vitals Value Taken Time   BP 92/50 04/07/22 1840   Temp 36.7 °C (98.1 °F) 04/07/22 1800   Pulse 91 04/07/22 1840   Resp 16 04/07/22 1840   SpO2 94 % 04/07/22 1840

## 2022-04-08 NOTE — PERIOP NOTES
Attempted to call patient and patient  about patient expander cards that were left in recovery room.  Patient daughter states she will let her parents know that the expander cards are here at the hospital and will have them pick them up either later today or this coming Monday          DATE: 4/8/2022 AT 1246 PM

## 2022-04-12 ENCOUNTER — NURSE NAVIGATOR (OUTPATIENT)
Dept: CASE MANAGEMENT | Age: 52
End: 2022-04-12

## 2022-04-12 NOTE — PERIOP NOTES
Patient has not collected implant cards that were accidentally left in PACU from surgery on 04/07/22. 2 implant cards mailed to patient's home address in demographics: [address] [place] [address], [address].

## 2022-04-29 ENCOUNTER — HOSPITAL ENCOUNTER (OUTPATIENT)
Dept: LAB | Age: 52
Discharge: HOME | End: 2022-04-29
Payer: COMMERCIAL

## 2022-04-29 DIAGNOSIS — Z17.0 MALIGNANT NEOPLASM OF LEFT BREAST IN FEMALE, ESTROGEN RECEPTOR POSITIVE, UNSPECIFIED SITE OF BREAST (HCC): ICD-10-CM

## 2022-04-29 DIAGNOSIS — C50.912 MALIGNANT NEOPLASM OF LEFT BREAST IN FEMALE, ESTROGEN RECEPTOR POSITIVE, UNSPECIFIED SITE OF BREAST (HCC): ICD-10-CM

## 2022-04-29 LAB
ALBUMIN SERPL-MCNC: 3.6 G/DL (ref 3.5–5)
ALBUMIN/GLOB SERPL: 0.9 {RATIO} (ref 1.2–3.5)
ALP SERPL-CCNC: 106 U/L (ref 50–136)
ALT SERPL-CCNC: 54 U/L (ref 12–65)
ANION GAP SERPL CALC-SCNC: 5 MMOL/L (ref 7–16)
AST SERPL-CCNC: 31 U/L (ref 15–37)
BASOPHILS # BLD: 0.1 K/UL (ref 0–0.2)
BASOPHILS NFR BLD: 1 % (ref 0–2)
BILIRUB SERPL-MCNC: 0.3 MG/DL (ref 0.2–1.1)
BUN SERPL-MCNC: 13 MG/DL (ref 6–23)
CALCIUM SERPL-MCNC: 9.4 MG/DL (ref 8.3–10.4)
CHLORIDE SERPL-SCNC: 106 MMOL/L (ref 98–107)
CO2 SERPL-SCNC: 29 MMOL/L (ref 21–32)
CREAT SERPL-MCNC: 1 MG/DL (ref 0.6–1)
DIFFERENTIAL METHOD BLD: ABNORMAL
EOSINOPHIL # BLD: 0.6 K/UL (ref 0–0.8)
EOSINOPHIL NFR BLD: 8 % (ref 0.5–7.8)
ERYTHROCYTE [DISTWIDTH] IN BLOOD BY AUTOMATED COUNT: 14.4 % (ref 11.9–14.6)
GLOBULIN SER CALC-MCNC: 3.8 G/DL (ref 2.3–3.5)
GLUCOSE SERPL-MCNC: 104 MG/DL (ref 65–100)
HCT VFR BLD AUTO: 38.8 % (ref 35.8–46.3)
HGB BLD-MCNC: 12.6 G/DL (ref 11.7–15.4)
IMM GRANULOCYTES # BLD AUTO: 0 K/UL (ref 0–0.5)
IMM GRANULOCYTES NFR BLD AUTO: 0 % (ref 0–5)
LYMPHOCYTES # BLD: 3 K/UL (ref 0.5–4.6)
LYMPHOCYTES NFR BLD: 43 % (ref 13–44)
MCH RBC QN AUTO: 28.8 PG (ref 26.1–32.9)
MCHC RBC AUTO-ENTMCNC: 32.5 G/DL (ref 31.4–35)
MCV RBC AUTO: 88.6 FL (ref 79.6–97.8)
MONOCYTES # BLD: 0.5 K/UL (ref 0.1–1.3)
MONOCYTES NFR BLD: 7 % (ref 4–12)
NEUTS SEG # BLD: 3 K/UL (ref 1.7–8.2)
NEUTS SEG NFR BLD: 41 % (ref 43–78)
NRBC # BLD: 0 K/UL (ref 0–0.2)
PLATELET # BLD AUTO: 523 K/UL (ref 150–450)
PMV BLD AUTO: 9.2 FL (ref 9.4–12.3)
POTASSIUM SERPL-SCNC: 4 MMOL/L (ref 3.5–5.1)
PROT SERPL-MCNC: 7.4 G/DL (ref 6.3–8.2)
RBC # BLD AUTO: 4.38 M/UL (ref 4.05–5.2)
SODIUM SERPL-SCNC: 140 MMOL/L (ref 136–145)
WBC # BLD AUTO: 7.2 K/UL (ref 4.3–11.1)

## 2022-04-29 PROCEDURE — 85025 COMPLETE CBC W/AUTO DIFF WBC: CPT

## 2022-04-29 PROCEDURE — 36415 COLL VENOUS BLD VENIPUNCTURE: CPT

## 2022-04-29 PROCEDURE — 80053 COMPREHEN METABOLIC PANEL: CPT

## 2022-05-01 DIAGNOSIS — N60.99 ATYPICAL DUCTAL HYPERPLASIA OF BREAST: Primary | ICD-10-CM

## 2022-05-11 ENCOUNTER — HOSPITAL ENCOUNTER (OUTPATIENT)
Dept: RADIATION ONCOLOGY | Age: 52
Discharge: HOME | End: 2022-05-11
Payer: COMMERCIAL

## 2022-05-11 VITALS
TEMPERATURE: 98.6 F | WEIGHT: 181.5 LBS | SYSTOLIC BLOOD PRESSURE: 132 MMHG | OXYGEN SATURATION: 100 % | DIASTOLIC BLOOD PRESSURE: 85 MMHG | HEART RATE: 69 BPM | BODY MASS INDEX: 32.15 KG/M2

## 2022-05-11 PROCEDURE — 99211 OFF/OP EST MAY X REQ PHY/QHP: CPT

## 2022-05-11 NOTE — PROGRESS NOTES
Pt is here for the initial RT consult with Dr. Ana Paula Valdez for left breast IDC. She is s/p bilateral mastectomies on 4/7/22 and will have breast reconstruction by Dr. Lor Oropeza. The possible plan is for pt to receive 5 weeks of RT and then start Arimidex. She is followed in Med Onc by Dr. Francisco Vides. Research is here to talk to pt about a Trial.  An overview of RT and the Skin Care Guidelines handout were given. Pt is c/o of an itchy rash under both axillas--she has been seen by dermatologist Dr. Rosaura Clement in the past and is being assisted by the [address] [place] [address]  in getting an appointment with him. RT consents were signed.

## 2022-05-13 NOTE — CONSULTS
Patient: Gabby Nye MRN: [id number]  SSN: [id number]    YOB: 1970  Age: 46 y.o. Sex: female      Other Providers:  Dr. Niharika Fregoso and Dr. Luana Lu: I'm tired    DIAGNOSIS: pT1c pN1, L breast IDC, ER/NJ+, Her2-    PREVIOUS TREATMENT:  1. 04/07/2022: Bilateral mastectomy and L SLN bx    HISTORY OF PRESENT ILLNESS:  Gabby Nye is a 46 y.o. female who I am seeing at the request of Dr. Niharika Fregoso. Her oncologic history began early this year when she was found to have an abnormality in her L breast at the 3:00 position on screening mammogram. She underwent biopsy which revealed IDC, ER/NJ+, Her2-. MRI bilateral breast revealed a 2.1 cm mass at the 3:00 position on the left with no associated adenopathy. She then underwent bilateral mastectomy with R SLN bx. Pathology revealed no malignancy on the R but a 1.8 cm tumor on the left, grade 2 with associated LVI. Margins were clear. Moreover, SLN bx revealed 1/3 LNs involved without RENETTA. She also had reconstruction done in preparation for expanders and implants. Symptomatically, she has some fatigue. Denies pain or weight loss. PAST MEDICAL HISTORY:    Past Medical History:   Diagnosis Date    Agatston coronary artery calcium score less than 100 03/2018    CAC 0    Cancer ([facility] [address]     Chronic Sacral pain     Depression with anxiety     Essential hypertension     Family history of breast cancer     Menopause     Obesity, Class I, BMI 30-34.9     PUD (peptic ulcer disease)     Pulmonary nodules 03/2018    3 mm right upper lobe nodule and 2 mm left upper lobe nodule. Per pt no growth.  Right breast biopsy 6/2014, 7/2014       The patient denies history of collagen vascular diseases, pacemaker insertion, prior radiation or prior chemotherapy.      PAST SURGICAL HISTORY:   Past Surgical History:   Procedure Laterality Date    COLONOSCOPY N/A 4/11/2019    COLONOSCOPY/ 27 performed by Beck Herrera DO at [address] [place] [address] BREAST BIOPSY  06/2014    HX BREAST BIOPSY  7/2/2014    RIGHT BREAST NEEDLE LOCALIZED BIOPSY X2    [facility] Left 01/14/2022    stereo bx     HX BREAST RECONSTRUCTION Bilateral 4/7/2022    BIALTERAL BREAST RECONSTRUCTION STAGE 1 WITH TISSUE EXPANDERS AND ADM performed [facility]onaldo [facility] [address] [facility] HX HYSTEROSCOPY WITH ENDOMETRIAL ABLATION  07/2018    HX MASTECTOMY Bilateral 4/7/2022    BILATERAL BRE[facility]SO  08/18/2021    full hysterectomy    HX TUBAL LIGATION  1999    US GUIDED CORE BREAST BIOPSY Right 6/5/2014    6:00 position Fragments of fibroadenoma, simple    US GUIDED CORE BREAST BIOPSY Right 6/5/2014    7:00 pisition: Fibrocystic mastopathy having moderate intraductal hyperplasia, apocrine metaplasia and microcalcification. MEDICATIONS:     Current Outpatient Medications:     doxycycline (MONODOX) 100 mg capsule, , Disp: , Rfl:     cefadroxil (DURICEF) 500 mg capsule, Take 500 mg by mouth two (2) times a day., Disp: , Rfl:     anastrozole (Arimidex) 1 mg tablet, Take 1 mg by mouth daily. , Disp: 30 Tablet, Rfl: 5    desvenlafaxine succinate (PRISTIQ) 50 mg ER tablet, Take 1 Tablet by mouth daily. , Disp: 90 Tablet, Rfl: 1    LORazepam (ATIVAN) 1 mg tablet, Take 1 mg by mouth daily as needed. , Disp: , Rfl:     losartan (COZAAR) 50 mg tablet, TAKE 1 TABLET BY MOUTH DAILY, Disp: 90 Tablet, Rfl: 1    omeprazole (PRILOSEC) 20 mg capsule, TABLET 1 CAPSULE BY MOUTH EVERY DAY (Patient taking differently: Take 20 mg by mouth daily as needed.  TABLET 1 CAPSULE BY MOUTH EVERY DAY), Disp: 90 Capsule, Rfl: 1    hydroCHLOROthiazide (HYDRODIURIL) 12.5 mg tablet, TAKE 1 TABLET BY MOUTH DAILY (Patient not taking: Reported on 4/29/2022), Disp: 90 Tablet, Rfl: 1    ergocalciferol (ERGOCALCIFEROL) 1,250 mcg (50,000 unit) capsule, Take 1 Capsule by mouth two (2) days a week., Disp: 24 Capsule, Rfl: 0    ALLERGIES:   Allergies   Allergen Reactions    Citalopram Nausea and Vomiting    Bupropion Hcl Other (comments)    Fluoxetine Other (comments)     \"CRAZY THOUGHTS\"    Paroxetine Hcl Other (comments)     \"MADE SLEEPY\"       SOCIAL HISTORY:   Social History     Socioeconomic History    Marital status:      Spouse name: Not on file    Number of children: 2    Years of education: Not on file    Highest education level: Not on file   Occupational History    Occupation: [email or web address]   Tobacco Use    Smoking status: Former Smoker     Packs/day: 0.50     Years: 6.00     Pack years: 3.00     Types: Cigarettes     Quit date: 1999     Years since quittin.2    Smokeless tobacco: Never Used   Vaping Use    Vaping Use: Never used   Substance and Sexual Activity    Alcohol use: No    Drug use: No    Sexual activity: Yes     Partners: Male     Birth control/protection: None, Surgical   Other Topics Concern    Not on file   Social History Narrative    16 yr old son  in a MVA 2017. She has a 32 yr old daughter. She attends  "[employer]. Social Determinants of Health     Financial Resource Strain:     Difficulty of Paying Living Expenses: Not on file   Food Insecurity:     Worried About Running Out of Food in the Last Year: Not on file    Bryan of Food in the Last Year: Not on file   Transportation Needs:     Lack of Transportation (Medical): Not on file    Lack of Transportation (Non-Medical):  Not on file   Physical Activity:     Days of Exercise per Week: Not on file    Minutes of Exercise per Session: Not on file   Stress:     Feeling of Stress : Not on file   Social Connections:     Frequency of Communication with Friends and Family: Not on file    Frequency of Social Gatherings with Friends and Family: Not on file    Attends Advent Services: Not on file   [employer] of Clubs or Organizations: Not on file    Attends Club or Organization Meetings: Not on file    Marital Status: Not on file   Intimate Partner Violence:     Fear of Current or Ex-Partner: Not on file    Emotionally Abused: Not on file    Physically Abused: Not on file    Sexually Abused: Not on file   Housing Stability:     Unable to Pay for Housing in the Last Year: Not on file    Number of [place] in the Last Year: Not on file    Unstable Housing in the Last Year: Not on file       FAMILY HISTORY:   Family History   Problem Relation Age of Onset    Heart Disease Father     Heart Attack Father     Cancer Mother     Breast Cancer Mother 67    Breast Cancer Maternal Grandmother 80    Hypertension Brother     Breast Cancer Maternal Aunt 58       REVIEW OF SYSTEMS: Please see the completed review of systems sheet in the chart that I have reviewed today. PHYSICAL EXAMINATION:   ECOG Performance status 0  VITAL SIGNS:   Visit Vitals  /85   Pulse 69   Temp 98.6 °F (37 °C)   Wt 82.3 kg (181 lb 8 oz)   SpO2 100%   BMI 32.15 kg/m²      General: well developed/nourished adult Female in no acute distress; appears stated age  [de-identified]: normocephalic, atraumatic; EOMI  Neck: supple with full ROM;    Respiratory: normal inspiratory effort, no audible wheezes  Extremities: no cyanosis, clubbing, or edema  Musculoskeletal: mobility intact x4; normal ROM in all joints  Skin: no skin lesions identified  Neuro: AOx3; sensation intact x 4; CNII-XII grossly intact  Psych: appropriate affect, insight, and judgement  GI: abdomen soft, non-distended    PATHOLOGY:    Pathology report reviewed - see HPI    LABORATORY:   Lab Results   Component Value Date/Time    Sodium 140 04/29/2022 03:10 PM    Potassium 4.0 04/29/2022 03:10 PM    Chloride 106 04/29/2022 03:10 PM    CO2 29 04/29/2022 03:10 PM    Anion gap 5 (L) 04/29/2022 03:10 PM    Glucose 104 (H) 04/29/2022 03:10 PM    BUN 13 04/29/2022 03:10 PM    Creatinine 1.00 04/29/2022 03:10 PM    GFR est AA >60 04/29/2022 03:10 PM    GFR est non-AA >60 04/29/2022 03:10 PM    Calcium 9.4 04/29/2022 03:10 PM    Magnesium 2.1 02/22/2018 04:14 PM    Albumin 3.6 04/29/2022 03:10 PM    Protein, total 7.4 04/29/2022 03:10 PM    Globulin 3.8 (H) 04/29/2022 03:10 PM    A-G Ratio 0.9 (L) 04/29/2022 03:10 PM    ALT (SGPT) 54 04/29/2022 03:10 PM     Lab Results   Component Value Date/Time    WBC 7.2 04/29/2022 03:10 PM    HGB 12.6 04/29/2022 03:10 PM    HCT 38.8 04/29/2022 03:10 PM    PLATELET 334 (H) 24/87/2685 03:10 PM       RADIOLOGY:    NM LYMPHOSCINTIG LT BREAST    Result Date: 4/8/2022  History: Left breast cancer EXAM: Nuclear medicine lymphoscintigraphy FINDINGS: 510 uCi technetium 99m filtered sulfur colloid is injected at 2:00 and 4:00 positions of the left breast. There is a single left axillary lymph node which is marked. Single left axillary lymph node marked. NM HEPATOBILIARY W INTERVENTION    Result Date: 2/15/2022  NUCLEAR MEDICINE HEPATOBILIARY SCAN, 2/15/2022. CLINICAL HISTORY: Right upper quadrant pain with nausea for 2 months. Technique: Sequential planar images of the abdomen were obtained following the uneventful intravenous administration of 6.58 mCi Tc 99m-Choletec. In addition, 8 ounces Ensure was administered at 55 minutes into the study. FINDINGS: Following the administration of radiotracer, there is prompt uptake by the liver and clearance of cardiac blood pool activity. Early filling of the extra hepatic bile ducts is seen at 10 minutes. Early filling of the gallbladder is seen at 10 minutes. Activity seen within small bowel loops at 15 minutes. 1 can Ensure was administered at 55 minutes into the study. The gallbladder ejection fraction was measured to be 16% which is low. In addition, the patient noted moderate reproduction of the patient's \"typical\" abdominal pain with the administration of Ensure.      1. Normal filling of the gallbladder without findings to suggest acute cholecystitis. 2. Low gallbladder ejection fraction and moderate reproduction of the patient's \"typical\" abdominal pain with the administration of Ensure which suggest chronic cholecystitis/biliary dyskinesia. IMPRESSION:  Angela Fermin is a 46 y.o. female with pT1c N1 L breast IDC, ER/WA+, Her2- s/p bilateral mastectomy and R SLN bx. PLAN:    Given N1 disease, recommend adjvuant RT, 50 Gy in 25 fractions to L chestwall and regional LNs. Simulation soon with RT to start thereafter.       Debbie Tierney MD   May 13, 2022

## 2022-06-20 ENCOUNTER — HOSPITAL ENCOUNTER (OUTPATIENT)
Dept: RADIATION ONCOLOGY | Age: 52
Setting detail: RECURRING SERIES
Discharge: HOME | End: 2022-06-23
Payer: COMMERCIAL

## 2022-06-20 ENCOUNTER — HOSPITAL ENCOUNTER (OUTPATIENT)
Dept: RADIATION ONCOLOGY | Age: 52
Setting detail: RECURRING SERIES
Discharge: HOME | End: 2022-06-23
Attending: RADIOLOGY
Payer: COMMERCIAL

## 2022-06-20 VITALS
RESPIRATION RATE: 18 BRPM | HEART RATE: 64 BPM | TEMPERATURE: 97.9 F | SYSTOLIC BLOOD PRESSURE: 144 MMHG | OXYGEN SATURATION: 100 % | BODY MASS INDEX: 32.98 KG/M2 | WEIGHT: 186.2 LBS | DIASTOLIC BLOOD PRESSURE: 82 MMHG

## 2022-06-20 PROCEDURE — 77290 THER RAD SIMULAJ FIELD CPLX: CPT

## 2022-06-20 PROCEDURE — 77332 RADIATION TREATMENT AID(S): CPT

## 2022-06-20 RX ORDER — DESVENLAFAXINE 50 MG/1
50 TABLET, EXTENDED RELEASE ORAL DAILY
COMMUNITY
End: 2022-08-04 | Stop reason: DRUGHIGH

## 2022-06-22 ENCOUNTER — TELEPHONE (OUTPATIENT)
Dept: ONCOLOGY | Age: 52
End: 2022-06-22

## 2022-06-22 NOTE — TELEPHONE ENCOUNTER
Call to the patient and NP Erasmo Guerra states to call [facility] to ask these questions. She is using tylenol for her neck, hands, feet and knee pain. Patient transferred to [facility]. No orders from Dr Cynthia Carter. I reported to RUTH Hodges and she reviewed the chart the patient is to go to the PCP. Call to the patient and she is aware to call her PCP. She states she had a fever today oral 100.6. She took a home covid test and it was negative. She will call the PCP.

## 2022-06-24 ENCOUNTER — HOSPITAL ENCOUNTER (OUTPATIENT)
Dept: RADIATION ONCOLOGY | Age: 52
Setting detail: RECURRING SERIES
Discharge: HOME | End: 2022-06-27
Attending: RADIOLOGY
Payer: COMMERCIAL

## 2022-06-24 PROCEDURE — 77300 RADIATION THERAPY DOSE PLAN: CPT

## 2022-06-24 PROCEDURE — 77334 RADIATION TREATMENT AID(S): CPT

## 2022-06-24 PROCEDURE — 77295 3-D RADIOTHERAPY PLAN: CPT

## 2022-07-05 ENCOUNTER — HOSPITAL ENCOUNTER (OUTPATIENT)
Dept: RADIATION ONCOLOGY | Age: 52
Setting detail: RECURRING SERIES
End: 2022-07-05
Attending: RADIOLOGY
Payer: COMMERCIAL

## 2022-07-06 ENCOUNTER — APPOINTMENT (OUTPATIENT)
Dept: RADIATION ONCOLOGY | Age: 52
End: 2022-07-06
Attending: RADIOLOGY
Payer: COMMERCIAL

## 2022-07-07 ENCOUNTER — APPOINTMENT (OUTPATIENT)
Dept: RADIATION ONCOLOGY | Age: 52
End: 2022-07-07
Attending: RADIOLOGY
Payer: COMMERCIAL

## 2022-07-08 ENCOUNTER — APPOINTMENT (OUTPATIENT)
Dept: RADIATION ONCOLOGY | Age: 52
End: 2022-07-08
Attending: RADIOLOGY
Payer: COMMERCIAL

## 2022-07-11 ENCOUNTER — APPOINTMENT (OUTPATIENT)
Dept: RADIATION ONCOLOGY | Age: 52
End: 2022-07-11
Attending: RADIOLOGY
Payer: COMMERCIAL

## 2022-07-12 ENCOUNTER — APPOINTMENT (OUTPATIENT)
Dept: RADIATION ONCOLOGY | Age: 52
End: 2022-07-12
Attending: RADIOLOGY
Payer: COMMERCIAL

## 2022-07-13 ENCOUNTER — APPOINTMENT (OUTPATIENT)
Dept: RADIATION ONCOLOGY | Age: 52
End: 2022-07-13
Attending: RADIOLOGY
Payer: COMMERCIAL

## 2022-07-14 ENCOUNTER — HOSPITAL ENCOUNTER (OUTPATIENT)
Dept: RADIATION ONCOLOGY | Age: 52
Setting detail: RECURRING SERIES
Discharge: HOME | End: 2022-07-17
Attending: RADIOLOGY
Payer: COMMERCIAL

## 2022-07-14 ENCOUNTER — APPOINTMENT (OUTPATIENT)
Dept: RADIATION ONCOLOGY | Age: 52
End: 2022-07-14
Attending: RADIOLOGY
Payer: COMMERCIAL

## 2022-07-14 DIAGNOSIS — E55.9 VITAMIN D DEFICIENCY, UNSPECIFIED: Primary | ICD-10-CM

## 2022-07-15 ENCOUNTER — APPOINTMENT (OUTPATIENT)
Dept: RADIATION ONCOLOGY | Age: 52
End: 2022-07-15
Attending: RADIOLOGY
Payer: COMMERCIAL

## 2022-07-18 ENCOUNTER — APPOINTMENT (OUTPATIENT)
Dept: RADIATION ONCOLOGY | Age: 52
End: 2022-07-18
Attending: RADIOLOGY
Payer: COMMERCIAL

## 2022-07-19 ENCOUNTER — APPOINTMENT (OUTPATIENT)
Dept: RADIATION ONCOLOGY | Age: 52
End: 2022-07-19
Attending: RADIOLOGY
Payer: COMMERCIAL

## 2022-07-20 ENCOUNTER — APPOINTMENT (OUTPATIENT)
Dept: RADIATION ONCOLOGY | Age: 52
End: 2022-07-20
Attending: RADIOLOGY
Payer: COMMERCIAL

## 2022-07-20 ENCOUNTER — HOSPITAL ENCOUNTER (OUTPATIENT)
Dept: RADIATION ONCOLOGY | Age: 52
Setting detail: RECURRING SERIES
Discharge: HOME | End: 2022-07-23
Attending: RADIOLOGY
Payer: COMMERCIAL

## 2022-07-20 PROCEDURE — 77295 3-D RADIOTHERAPY PLAN: CPT

## 2022-07-20 PROCEDURE — 77334 RADIATION TREATMENT AID(S): CPT

## 2022-07-20 PROCEDURE — 77300 RADIATION THERAPY DOSE PLAN: CPT

## 2022-07-21 ENCOUNTER — APPOINTMENT (OUTPATIENT)
Dept: RADIATION ONCOLOGY | Age: 52
End: 2022-07-21
Attending: RADIOLOGY
Payer: COMMERCIAL

## 2022-07-22 ENCOUNTER — APPOINTMENT (OUTPATIENT)
Dept: RADIATION ONCOLOGY | Age: 52
End: 2022-07-22
Attending: RADIOLOGY
Payer: COMMERCIAL

## 2022-07-25 ENCOUNTER — APPOINTMENT (OUTPATIENT)
Dept: RADIATION ONCOLOGY | Age: 52
End: 2022-07-25
Attending: RADIOLOGY
Payer: COMMERCIAL

## 2022-07-26 ENCOUNTER — HOSPITAL ENCOUNTER (OUTPATIENT)
Dept: RADIATION ONCOLOGY | Age: 52
Setting detail: RECURRING SERIES
Discharge: HOME | End: 2022-07-29
Attending: RADIOLOGY
Payer: COMMERCIAL

## 2022-07-26 ENCOUNTER — APPOINTMENT (OUTPATIENT)
Dept: RADIATION ONCOLOGY | Age: 52
End: 2022-07-26
Attending: RADIOLOGY
Payer: COMMERCIAL

## 2022-07-26 PROCEDURE — 77412 RADIATION TX DELIVERY LVL 3: CPT

## 2022-07-26 PROCEDURE — 77280 THER RAD SIMULAJ FIELD SMPL: CPT

## 2022-07-26 PROCEDURE — 77387 GUIDANCE FOR RADJ TX DLVR: CPT

## 2022-07-27 ENCOUNTER — HOSPITAL ENCOUNTER (OUTPATIENT)
Dept: RADIATION ONCOLOGY | Age: 52
Setting detail: RECURRING SERIES
Discharge: HOME | End: 2022-07-30
Attending: RADIOLOGY
Payer: COMMERCIAL

## 2022-07-27 ENCOUNTER — APPOINTMENT (OUTPATIENT)
Dept: RADIATION ONCOLOGY | Age: 52
End: 2022-07-27
Attending: RADIOLOGY
Payer: COMMERCIAL

## 2022-07-27 PROCEDURE — 77412 RADIATION TX DELIVERY LVL 3: CPT

## 2022-07-27 PROCEDURE — 77387 GUIDANCE FOR RADJ TX DLVR: CPT

## 2022-07-28 ENCOUNTER — APPOINTMENT (OUTPATIENT)
Dept: RADIATION ONCOLOGY | Age: 52
End: 2022-07-28
Attending: RADIOLOGY
Payer: COMMERCIAL

## 2022-07-28 ENCOUNTER — HOSPITAL ENCOUNTER (OUTPATIENT)
Dept: RADIATION ONCOLOGY | Age: 52
Setting detail: RECURRING SERIES
Discharge: HOME | End: 2022-07-31
Attending: RADIOLOGY
Payer: COMMERCIAL

## 2022-07-28 PROCEDURE — 77387 GUIDANCE FOR RADJ TX DLVR: CPT

## 2022-07-28 PROCEDURE — 77412 RADIATION TX DELIVERY LVL 3: CPT

## 2022-07-29 ENCOUNTER — APPOINTMENT (OUTPATIENT)
Dept: RADIATION ONCOLOGY | Age: 52
End: 2022-07-29
Attending: RADIOLOGY
Payer: COMMERCIAL

## 2022-07-29 ENCOUNTER — HOSPITAL ENCOUNTER (OUTPATIENT)
Dept: RADIATION ONCOLOGY | Age: 52
Setting detail: RECURRING SERIES
Discharge: HOME | End: 2022-08-01
Attending: RADIOLOGY
Payer: COMMERCIAL

## 2022-07-29 PROCEDURE — 77412 RADIATION TX DELIVERY LVL 3: CPT

## 2022-07-29 PROCEDURE — 77387 GUIDANCE FOR RADJ TX DLVR: CPT

## 2022-08-01 ENCOUNTER — HOSPITAL ENCOUNTER (OUTPATIENT)
Dept: RADIATION ONCOLOGY | Age: 52
Setting detail: RECURRING SERIES
Discharge: HOME | End: 2022-08-04
Attending: RADIOLOGY
Payer: COMMERCIAL

## 2022-08-01 ENCOUNTER — APPOINTMENT (OUTPATIENT)
Dept: RADIATION ONCOLOGY | Age: 52
End: 2022-08-01
Attending: RADIOLOGY
Payer: COMMERCIAL

## 2022-08-01 PROCEDURE — 77336 RADIATION PHYSICS CONSULT: CPT

## 2022-08-01 PROCEDURE — 77387 GUIDANCE FOR RADJ TX DLVR: CPT

## 2022-08-01 PROCEDURE — 77412 RADIATION TX DELIVERY LVL 3: CPT

## 2022-08-01 NOTE — PROGRESS NOTES
Patient: Corie Jones MRN: [id number]  SSN: [id number]    YOB: 1970  Age: 46 y.o. Sex: female      DIAGNOSIS:  pT1c pN1, L breast IDC, ER/ND+, Her2-    TREATMENT SITE:  L chest wall and regional lymph nodes    DOSE and FRACTIONATION:  5 of 25 fractions; 1000 of 5000cGy    INTERVAL HISTORY:  Corie Jones is a 46 y.o. female being treated for pT1c pN1, L breast IDC, ER/ND+, Her2-.      Week 1:  No complaints. OBJECTIVE:  NAD  There were no vitals filed for this visit. Lab Results   Component Value Date/Time     04/29/2022 03:10 PM    K 4.0 04/29/2022 03:10 PM     04/29/2022 03:10 PM    CO2 29 04/29/2022 03:10 PM    BUN 13 04/29/2022 03:10 PM    GFRAA >60 04/29/2022 03:10 PM    GLOB 3.8 04/29/2022 03:10 PM    ALT 54 04/29/2022 03:10 PM     Lab Results   Component Value Date/Time    WBC 7.2 04/29/2022 03:10 PM    HGB 12.6 04/29/2022 03:10 PM    HCT 38.8 04/29/2022 03:10 PM     04/29/2022 03:10 PM       ASSESSMENT and PLAN:  Corie Jones is tolerating radiation as anticipated for the current dose and fraction. We will continue on as planned with another treatment visit anticipated next week.         Hermila Keene MD   August 1, 2022

## 2022-08-02 ENCOUNTER — APPOINTMENT (OUTPATIENT)
Dept: RADIATION ONCOLOGY | Age: 52
End: 2022-08-02
Attending: RADIOLOGY
Payer: COMMERCIAL

## 2022-08-02 ENCOUNTER — HOSPITAL ENCOUNTER (OUTPATIENT)
Dept: RADIATION ONCOLOGY | Age: 52
Setting detail: RECURRING SERIES
Discharge: HOME | End: 2022-08-05
Attending: RADIOLOGY
Payer: COMMERCIAL

## 2022-08-02 PROCEDURE — 77412 RADIATION TX DELIVERY LVL 3: CPT

## 2022-08-02 PROCEDURE — 77387 GUIDANCE FOR RADJ TX DLVR: CPT

## 2022-08-03 ENCOUNTER — APPOINTMENT (OUTPATIENT)
Dept: RADIATION ONCOLOGY | Age: 52
End: 2022-08-03
Attending: RADIOLOGY
Payer: COMMERCIAL

## 2022-08-03 ENCOUNTER — HOSPITAL ENCOUNTER (OUTPATIENT)
Dept: RADIATION ONCOLOGY | Age: 52
Setting detail: RECURRING SERIES
Discharge: HOME | End: 2022-08-06
Attending: RADIOLOGY
Payer: COMMERCIAL

## 2022-08-03 PROCEDURE — 77387 GUIDANCE FOR RADJ TX DLVR: CPT

## 2022-08-03 PROCEDURE — 77412 RADIATION TX DELIVERY LVL 3: CPT

## 2022-08-04 ENCOUNTER — HOSPITAL ENCOUNTER (OUTPATIENT)
Dept: RADIATION ONCOLOGY | Age: 52
Setting detail: RECURRING SERIES
Discharge: HOME | End: 2022-08-07
Attending: RADIOLOGY
Payer: COMMERCIAL

## 2022-08-04 ENCOUNTER — OFFICE VISIT (OUTPATIENT)
Dept: INTERNAL MEDICINE CLINIC | Facility: CLINIC | Age: 52
End: 2022-08-04
Payer: COMMERCIAL

## 2022-08-04 ENCOUNTER — APPOINTMENT (OUTPATIENT)
Dept: RADIATION ONCOLOGY | Age: 52
End: 2022-08-04
Attending: RADIOLOGY
Payer: COMMERCIAL

## 2022-08-04 VITALS
OXYGEN SATURATION: 97 % | HEIGHT: 63 IN | SYSTOLIC BLOOD PRESSURE: 116 MMHG | WEIGHT: 181 LBS | BODY MASS INDEX: 32.07 KG/M2 | TEMPERATURE: 97.9 F | HEART RATE: 75 BPM | DIASTOLIC BLOOD PRESSURE: 80 MMHG

## 2022-08-04 DIAGNOSIS — N60.99 ATYPICAL DUCTAL HYPERPLASIA OF BREAST: ICD-10-CM

## 2022-08-04 DIAGNOSIS — I10 ESSENTIAL HYPERTENSION: Primary | ICD-10-CM

## 2022-08-04 DIAGNOSIS — F41.8 DEPRESSION WITH ANXIETY: ICD-10-CM

## 2022-08-04 DIAGNOSIS — K82.8 BILIARY DYSKINESIA: ICD-10-CM

## 2022-08-04 DIAGNOSIS — Z23 NEED FOR PNEUMOCOCCAL VACCINATION: ICD-10-CM

## 2022-08-04 PROCEDURE — 77412 RADIATION TX DELIVERY LVL 3: CPT

## 2022-08-04 PROCEDURE — 90471 IMMUNIZATION ADMIN: CPT | Performed by: INTERNAL MEDICINE

## 2022-08-04 PROCEDURE — 99214 OFFICE O/P EST MOD 30 MIN: CPT | Performed by: INTERNAL MEDICINE

## 2022-08-04 PROCEDURE — 77387 GUIDANCE FOR RADJ TX DLVR: CPT

## 2022-08-04 PROCEDURE — 90677 PCV20 VACCINE IM: CPT | Performed by: INTERNAL MEDICINE

## 2022-08-04 RX ORDER — LOSARTAN POTASSIUM 50 MG/1
50 TABLET ORAL DAILY
Qty: 90 TABLET | Refills: 1 | Status: CANCELLED | OUTPATIENT
Start: 2022-08-04

## 2022-08-04 RX ORDER — LOSARTAN POTASSIUM 50 MG/1
50 TABLET ORAL DAILY
COMMUNITY
End: 2022-08-31 | Stop reason: SDUPTHER

## 2022-08-04 RX ORDER — HYDROCHLOROTHIAZIDE 12.5 MG/1
12.5 TABLET ORAL DAILY
COMMUNITY
End: 2022-08-31 | Stop reason: ALTCHOICE

## 2022-08-04 RX ORDER — LOSARTAN POTASSIUM AND HYDROCHLOROTHIAZIDE 12.5; 5 MG/1; MG/1
1 TABLET ORAL DAILY
Qty: 90 TABLET | Refills: 1 | Status: SHIPPED | OUTPATIENT
Start: 2022-08-04 | End: 2022-11-04 | Stop reason: SDUPTHER

## 2022-08-04 RX ORDER — DESVENLAFAXINE 25 MG/1
25 TABLET, EXTENDED RELEASE ORAL DAILY
Qty: 90 TABLET | Refills: 1 | Status: SHIPPED | OUTPATIENT
Start: 2022-08-04

## 2022-08-04 RX ORDER — HYDROCHLOROTHIAZIDE 12.5 MG/1
12.5 TABLET ORAL DAILY
Qty: 90 TABLET | Refills: 1 | Status: CANCELLED | OUTPATIENT
Start: 2022-08-04

## 2022-08-04 RX ORDER — DESVENLAFAXINE 50 MG/1
50 TABLET, EXTENDED RELEASE ORAL DAILY
Qty: 90 TABLET | Refills: 1 | Status: CANCELLED | OUTPATIENT
Start: 2022-08-04

## 2022-08-04 SDOH — ECONOMIC STABILITY: FOOD INSECURITY: WITHIN THE PAST 12 MONTHS, THE FOOD YOU BOUGHT JUST DIDN'T LAST AND YOU DIDN'T HAVE MONEY TO GET MORE.: NEVER TRUE

## 2022-08-04 SDOH — ECONOMIC STABILITY: FOOD INSECURITY: WITHIN THE PAST 12 MONTHS, YOU WORRIED THAT YOUR FOOD WOULD RUN OUT BEFORE YOU GOT MONEY TO BUY MORE.: NEVER TRUE

## 2022-08-04 ASSESSMENT — PATIENT HEALTH QUESTIONNAIRE - PHQ9
2. FEELING DOWN, DEPRESSED OR HOPELESS: 0
7. TROUBLE CONCENTRATING ON THINGS, SUCH AS READING THE NEWSPAPER OR WATCHING TELEVISION: 0
3. TROUBLE FALLING OR STAYING ASLEEP: 0
SUM OF ALL RESPONSES TO PHQ QUESTIONS 1-9: 0
4. FEELING TIRED OR HAVING LITTLE ENERGY: 0
SUM OF ALL RESPONSES TO PHQ9 QUESTIONS 1 & 2: 0
SUM OF ALL RESPONSES TO PHQ QUESTIONS 1-9: 0
8. MOVING OR SPEAKING SO SLOWLY THAT OTHER PEOPLE COULD HAVE NOTICED. OR THE OPPOSITE, BEING SO FIGETY OR RESTLESS THAT YOU HAVE BEEN MOVING AROUND A LOT MORE THAN USUAL: 0
9. THOUGHTS THAT YOU WOULD BE BETTER OFF DEAD, OR OF HURTING YOURSELF: 0
SUM OF ALL RESPONSES TO PHQ QUESTIONS 1-9: 0
1. LITTLE INTEREST OR PLEASURE IN DOING THINGS: 0
5. POOR APPETITE OR OVEREATING: 0
6. FEELING BAD ABOUT YOURSELF - OR THAT YOU ARE A FAILURE OR HAVE LET YOURSELF OR YOUR FAMILY DOWN: 0
SUM OF ALL RESPONSES TO PHQ QUESTIONS 1-9: 0

## 2022-08-04 ASSESSMENT — SOCIAL DETERMINANTS OF HEALTH (SDOH): HOW HARD IS IT FOR YOU TO PAY FOR THE VERY BASICS LIKE FOOD, HOUSING, MEDICAL CARE, AND HEATING?: NOT HARD AT ALL

## 2022-08-04 NOTE — PROGRESS NOTES
Richard Engel M.D. Internal Medicine  [address] [facility] [place], [address]   Office : [phone]  Fax : [phone]    Chief Complaint   Patient presents with    Hypertension     3 mo follow up with med refill and labs       History of Present Illness: Jonathan Alexander is a 46 y.o. female. HPI    Hypertension  Patient is in for Hypertension which is stable. Diet and Lifestyle: generally follows a low sodium diet, exercises sporadically, nonsmoker  Home BP Monitoring: is not measured at home. Patient's recent blood pressures were previously   BP Readings from Last 3 Encounters:   08/04/22 116/80   06/20/22 (!) 144/82   04/29/22 134/79   . taking medications as instructed, no medication side effects noted, no TIA's, no chest pain on exertion, no dyspnea on exertion, no swelling of ankles. Cardiac risk factors consist of hypertension, post-menopausal.     Lab Results   Component Value Date/Time     04/29/2022 03:10 PM    K 4.0 04/29/2022 03:10 PM     04/29/2022 03:10 PM    CO2 29 04/29/2022 03:10 PM    BUN 13 04/29/2022 03:10 PM    GFRAA >60 04/29/2022 03:10 PM        GERD  Controlled with intermittent use of omprazole    Depression with anxiety  Improved so she wants to decrease Pristiq    Biliary Dyskinesia  Abdominal surgery post-poned due to recent diagnosis of breast cancer. Still having intermittent pain    Breast Cancer  Had bilateral mastectomies with lymph node dissection and is undergoing RT.   HRT had to be stopped    Past Medical History:  Past Medical History:   Diagnosis Date    Agatston coronary artery calcium score less than 100 03/2018    CAC 0    Cancer ([address]     Depression with anxiety     Essential hypertension     Family history of breast cancer     H/O right breast biopsy 6/2014, 7/2014    Menopause     Obesity, Class I, BMI 30-34.9     PUD (peptic ulcer disease)     Pulmonary nodules 03/2018    3 mm right upper lobe nodule and 2 mm left upper lobe nodule. Per pt no growth. Sacral pain      Past Surgical History:  Past Surgical History:   Procedure Laterality Date    BREAST BIOPSY Left 01/14/2022    stereo bx     BREAST BIOPSY  7/2/2014    RIGHT BREAST NEEDLE LOCALIZED BIOPSY X2    performed by Angelita Nolasco MD at [address]  06/2014    BREAST RECONSTRUCTION Bilateral 4/7/2022    BIALTERAL BREAST RECONSTRUCTION STAGE 1 WITH TISSUE EXPANDERS AND ADM performed by Sharyle Rei, MD at [address] N/A 4/11/2019    COLONOSCOPY/ 27 performed by Jagdish King DO at [address]  07/2018    [facility] LOC BREAST BIOPSY LEFT Left 1/14/2022    [facility]ANDEE STEROTACT[facility] LOC BREAST BIOPSY LEFT 1/14/2022 [facility]    MASTECTOMY Bilateral 4/7/2022    BILATERAL BREAST MASTECTOMY performed by Sandhya Bobby MD at [address] (CERVIX REMOVED)  08/18/2021    full hysterectomy    TUBAL LIGATION  1999    US GUIDED CORE BREAST BIOPSY Right 6/5/2014    7:00 pisition: Fibrocystic mastopathy having moderate intraductal hyperplasia, apocrine metaplasia and microcalcification. US GUIDED CORE BREAST BIOPSY Right 6/5/2014    6:00 position Fragments of fibroadenoma, simple     Allergies:   No Known Allergies  Medications:   Current Outpatient Medications   Medication Sig Dispense Refill    losartan (COZAAR) 50 MG tablet Take 50 mg by mouth in the morning. hydroCHLOROthiazide (HYDRODIURIL) 12.5 MG tablet Take 12.5 mg by mouth in the morning. losartan-hydroCHLOROthiazide (HYZAAR) 50-12.5 MG per tablet Take 1 tablet by mouth in the morning. 90 tablet 1    desvenlafaxine succinate (PRISTIQ) 25 MG TB24 extended release tablet Take 1 tablet by mouth in the morning. 90 tablet 1     No current facility-administered medications for this visit.      Social History:  Social History     Tobacco Use    Smoking status: Former Packs/day: 0.50     Years: 15.00     Pack years: 7.50     Types: Cigarettes     Quit date: 1999     Years since quittin.4    Smokeless tobacco: Never   Substance Use Topics    Alcohol use: No     Family History  Family History   Problem Relation Age of Onset    Heart Attack Father     Breast Cancer Maternal Grandmother 80    Breast Cancer Mother 67    Heart Disease Father     Breast Cancer Maternal Aunt 58    Cancer Mother     Hypertension Brother        Review of Systems   Constitutional:  Negative for appetite change, chills, fatigue and fever. Cardiovascular:  Negative for leg swelling. Musculoskeletal:  Negative for gait problem. Skin:  Negative for rash. Neurological:  Negative for speech difficulty. Psychiatric/Behavioral:  The patient is not nervous/anxious. Vital Signs  /80 (Site: Left Upper Arm, Position: Sitting, Cuff Size: Large Adult)   Pulse 75   Temp 97.9 °F (36.6 °C) (Temporal)   Ht 5' 3\" (1.6 m)   Wt 181 lb (82.1 kg)   SpO2 97%   BMI 32.06 kg/m²   Body mass index is 32.06 kg/m². Physical Exam  Vitals reviewed. Constitutional:       General: She is not in acute distress. Appearance: Normal appearance. She is not ill-appearing. HENT:      Head: Normocephalic and atraumatic. Eyes:      General: No scleral icterus. Extraocular Movements: Extraocular movements intact. Conjunctiva/sclera: Conjunctivae normal.   Neck:      Vascular: No carotid bruit. Cardiovascular:      Rate and Rhythm: Normal rate and regular rhythm. Heart sounds: Normal heart sounds. No murmur heard. Pulmonary:      Effort: Pulmonary effort is normal.      Breath sounds: Normal breath sounds. Musculoskeletal:         General: No swelling. Skin:     Coloration: Skin is not jaundiced. Findings: No rash. Neurological:      General: No focal deficit present. Mental Status: She is alert. Mental status is at baseline.       Cranial Nerves: No cranial nerve deficit. Motor: No weakness. Gait: Gait normal.   Psychiatric:         Mood and Affect: Mood normal.         Behavior: Behavior normal.         Thought Content: Thought content normal.         Judgment: Judgment normal.         Assessment/Plan:  John Guillen was seen today for hypertension. Diagnoses and all orders for this visit:    Essential hypertension  -     losartan-hydroCHLOROthiazide (HYZAAR) 50-12.5 MG per tablet; Take 1 tablet by mouth in the morning. Depression with anxiety  -     desvenlafaxine succinate (PRISTIQ) 25 MG TB24 extended release tablet; Take 1 tablet by mouth in the morning. Need for pneumococcal vaccination  -     Pneumococcal, PCV20, PREVNAR 21, (age 25 yrs+), IM, PF    Biliary dyskinesia    Atypical ductal hyperplasia of breast      Return in about 3 months (around 11/4/2022), or if symptoms worsen or fail to improve.   __  Kitty Hays M.D.

## 2022-08-05 ENCOUNTER — HOSPITAL ENCOUNTER (OUTPATIENT)
Dept: RADIATION ONCOLOGY | Age: 52
Setting detail: RECURRING SERIES
Discharge: HOME | End: 2022-08-08
Attending: RADIOLOGY
Payer: COMMERCIAL

## 2022-08-05 ENCOUNTER — APPOINTMENT (OUTPATIENT)
Dept: RADIATION ONCOLOGY | Age: 52
End: 2022-08-05
Attending: RADIOLOGY
Payer: COMMERCIAL

## 2022-08-05 PROCEDURE — 77387 GUIDANCE FOR RADJ TX DLVR: CPT

## 2022-08-05 PROCEDURE — 77412 RADIATION TX DELIVERY LVL 3: CPT

## 2022-08-08 ENCOUNTER — APPOINTMENT (OUTPATIENT)
Dept: RADIATION ONCOLOGY | Age: 52
End: 2022-08-08
Attending: RADIOLOGY
Payer: COMMERCIAL

## 2022-08-08 ENCOUNTER — HOSPITAL ENCOUNTER (OUTPATIENT)
Dept: RADIATION ONCOLOGY | Age: 52
Setting detail: RECURRING SERIES
Discharge: HOME | End: 2022-08-11
Attending: RADIOLOGY
Payer: COMMERCIAL

## 2022-08-08 PROCEDURE — 77336 RADIATION PHYSICS CONSULT: CPT

## 2022-08-08 PROCEDURE — 77412 RADIATION TX DELIVERY LVL 3: CPT

## 2022-08-08 NOTE — PROGRESS NOTES
Patient: Erma Mitchell MRN: [id number]  SSN: [id number]    YOB: 1970  Age: 46 y.o. Sex: female      DIAGNOSIS:  pT1c pN1, L breast IDC, ER/OR+, Her2-    TREATMENT SITE:  L chest wall and regional lymph nodes    DOSE and FRACTIONATION:  10 of 25 fractions; 2000 of 5000cGy    INTERVAL HISTORY:  Erma Mitchell is a 46 y.o. female being treated for pT1c pN1, L breast IDC, ER/OR+, Her2-.      Week 1: No complaints. Week 2: Fatigue. OBJECTIVE:  NAD  There were no vitals filed for this visit. Lab Results   Component Value Date/Time     04/29/2022 03:10 PM    K 4.0 04/29/2022 03:10 PM     04/29/2022 03:10 PM    CO2 29 04/29/2022 03:10 PM    BUN 13 04/29/2022 03:10 PM    GFRAA >60 04/29/2022 03:10 PM    GLOB 3.8 04/29/2022 03:10 PM    ALT 54 04/29/2022 03:10 PM     Lab Results   Component Value Date/Time    WBC 7.2 04/29/2022 03:10 PM    HGB 12.6 04/29/2022 03:10 PM    HCT 38.8 04/29/2022 03:10 PM     04/29/2022 03:10 PM       ASSESSMENT and PLAN:  Erma Mitchell is tolerating radiation as anticipated for the current dose and fraction. We will continue on as planned with another treatment visit anticipated next week.         Marina Cunningham MD   August 8, 2022

## 2022-08-09 ENCOUNTER — HOSPITAL ENCOUNTER (OUTPATIENT)
Dept: RADIATION ONCOLOGY | Age: 52
Setting detail: RECURRING SERIES
Discharge: HOME | End: 2022-08-12
Attending: RADIOLOGY
Payer: COMMERCIAL

## 2022-08-09 PROCEDURE — 77412 RADIATION TX DELIVERY LVL 3: CPT

## 2022-08-10 ENCOUNTER — HOSPITAL ENCOUNTER (OUTPATIENT)
Dept: RADIATION ONCOLOGY | Age: 52
Setting detail: RECURRING SERIES
Discharge: HOME | End: 2022-08-13
Attending: RADIOLOGY
Payer: COMMERCIAL

## 2022-08-10 PROCEDURE — 77412 RADIATION TX DELIVERY LVL 3: CPT

## 2022-08-10 PROCEDURE — 77387 GUIDANCE FOR RADJ TX DLVR: CPT

## 2022-08-11 ENCOUNTER — HOSPITAL ENCOUNTER (OUTPATIENT)
Dept: RADIATION ONCOLOGY | Age: 52
Setting detail: RECURRING SERIES
Discharge: HOME | End: 2022-08-14
Attending: RADIOLOGY
Payer: COMMERCIAL

## 2022-08-11 PROCEDURE — 77412 RADIATION TX DELIVERY LVL 3: CPT

## 2022-08-11 PROCEDURE — 77387 GUIDANCE FOR RADJ TX DLVR: CPT

## 2022-08-12 ENCOUNTER — HOSPITAL ENCOUNTER (OUTPATIENT)
Dept: RADIATION ONCOLOGY | Age: 52
Setting detail: RECURRING SERIES
Discharge: HOME | End: 2022-08-15
Attending: RADIOLOGY
Payer: COMMERCIAL

## 2022-08-12 PROCEDURE — 77412 RADIATION TX DELIVERY LVL 3: CPT

## 2022-08-12 PROCEDURE — 77387 GUIDANCE FOR RADJ TX DLVR: CPT

## 2022-08-15 ENCOUNTER — HOSPITAL ENCOUNTER (OUTPATIENT)
Dept: RADIATION ONCOLOGY | Age: 52
Setting detail: RECURRING SERIES
Discharge: HOME | End: 2022-08-18
Attending: RADIOLOGY
Payer: COMMERCIAL

## 2022-08-15 PROCEDURE — 77412 RADIATION TX DELIVERY LVL 3: CPT

## 2022-08-15 PROCEDURE — 77387 GUIDANCE FOR RADJ TX DLVR: CPT

## 2022-08-15 PROCEDURE — 77336 RADIATION PHYSICS CONSULT: CPT

## 2022-08-15 NOTE — PROGRESS NOTES
Patient: Mele Pritchett MRN: [id number]  SSN: [id number]    YOB: 1970  Age: 46 y.o. Sex: female      DIAGNOSIS:  pT1c pN1, L breast IDC, ER/MA+, Her2-    TREATMENT SITE:  L chest wall and regional lymph nodes    DOSE and FRACTIONATION:  15 of 25 fractions; 3000 of 5000cGy    INTERVAL HISTORY:  Mele Pritchett is a 46 y.o. female being treated for pT1c pN1, L breast IDC, ER/MA+, Her2-.      Week 1: No complaints. Week 2: Fatigue. Week 3: No complaints. OBJECTIVE:  NAD  There were no vitals filed for this visit. Lab Results   Component Value Date/Time     04/29/2022 03:10 PM    K 4.0 04/29/2022 03:10 PM     04/29/2022 03:10 PM    CO2 29 04/29/2022 03:10 PM    BUN 13 04/29/2022 03:10 PM    GFRAA >60 04/29/2022 03:10 PM    GLOB 3.8 04/29/2022 03:10 PM    ALT 54 04/29/2022 03:10 PM     Lab Results   Component Value Date/Time    WBC 7.2 04/29/2022 03:10 PM    HGB 12.6 04/29/2022 03:10 PM    HCT 38.8 04/29/2022 03:10 PM     04/29/2022 03:10 PM       ASSESSMENT and PLAN:  Mele Pritchett is tolerating radiation as anticipated for the current dose and fraction. We will continue on as planned with another treatment visit anticipated next week.         Wero Ware MD   August 15, 2022

## 2022-08-16 ENCOUNTER — APPOINTMENT (OUTPATIENT)
Dept: RADIATION ONCOLOGY | Age: 52
End: 2022-08-16
Attending: RADIOLOGY
Payer: COMMERCIAL

## 2022-08-17 ENCOUNTER — HOSPITAL ENCOUNTER (OUTPATIENT)
Dept: RADIATION ONCOLOGY | Age: 52
Setting detail: RECURRING SERIES
Discharge: HOME | End: 2022-08-20
Attending: RADIOLOGY
Payer: COMMERCIAL

## 2022-08-17 PROCEDURE — 77412 RADIATION TX DELIVERY LVL 3: CPT

## 2022-08-17 PROCEDURE — 77387 GUIDANCE FOR RADJ TX DLVR: CPT

## 2022-08-18 ENCOUNTER — HOSPITAL ENCOUNTER (OUTPATIENT)
Dept: RADIATION ONCOLOGY | Age: 52
Setting detail: RECURRING SERIES
Discharge: HOME | End: 2022-08-21
Attending: RADIOLOGY
Payer: COMMERCIAL

## 2022-08-18 PROCEDURE — 77387 GUIDANCE FOR RADJ TX DLVR: CPT

## 2022-08-18 PROCEDURE — 77412 RADIATION TX DELIVERY LVL 3: CPT

## 2022-08-19 ENCOUNTER — HOSPITAL ENCOUNTER (OUTPATIENT)
Dept: RADIATION ONCOLOGY | Age: 52
Setting detail: RECURRING SERIES
Discharge: HOME | End: 2022-08-22
Attending: RADIOLOGY
Payer: COMMERCIAL

## 2022-08-19 PROCEDURE — 77387 GUIDANCE FOR RADJ TX DLVR: CPT

## 2022-08-19 PROCEDURE — 77412 RADIATION TX DELIVERY LVL 3: CPT

## 2022-08-22 ENCOUNTER — HOSPITAL ENCOUNTER (OUTPATIENT)
Dept: RADIATION ONCOLOGY | Age: 52
Setting detail: RECURRING SERIES
Discharge: HOME | End: 2022-08-25
Attending: RADIOLOGY
Payer: COMMERCIAL

## 2022-08-22 PROCEDURE — 77412 RADIATION TX DELIVERY LVL 3: CPT

## 2022-08-22 PROCEDURE — 77387 GUIDANCE FOR RADJ TX DLVR: CPT

## 2022-08-22 NOTE — PROGRESS NOTES
Patient: Yandy Smith MRN: [id number]  SSN: [id number]    YOB: 1970  Age: 46 y.o. Sex: female      DIAGNOSIS:  pT1c pN1, L breast IDC, ER/NH+, Her2-    TREATMENT SITE:  L chest wall and regional lymph nodes    DOSE and FRACTIONATION:  19 of 25 fractions; 3800 of 5000cGy    INTERVAL HISTORY:  Yandy Smith is a 46 y.o. female being treated for pT1c pN1, L breast IDC, ER/NH+, Her2-.      Week 1: No complaints. Week 2: Fatigue. Week 3: No complaints. Week 4: Fatigue, rash on arms, erythema under axilla    OBJECTIVE:  Papular rash on bilateral forearms. Erythema without desquamation under L axilla. There were no vitals filed for this visit. Lab Results   Component Value Date/Time     04/29/2022 03:10 PM    K 4.0 04/29/2022 03:10 PM     04/29/2022 03:10 PM    CO2 29 04/29/2022 03:10 PM    BUN 13 04/29/2022 03:10 PM    GFRAA >60 04/29/2022 03:10 PM    GLOB 3.8 04/29/2022 03:10 PM    ALT 54 04/29/2022 03:10 PM     Lab Results   Component Value Date/Time    WBC 7.2 04/29/2022 03:10 PM    HGB 12.6 04/29/2022 03:10 PM    HCT 38.8 04/29/2022 03:10 PM     04/29/2022 03:10 PM       ASSESSMENT and PLAN:  Yandy Smith is tolerating radiation as anticipated for the current dose and fraction. We will continue on as planned with another treatment visit anticipated next week. Aquaphor for axilla. Hydrocortisone for arm rash.       Jeannie Liao MD   August 22, 2022

## 2022-08-23 ENCOUNTER — HOSPITAL ENCOUNTER (OUTPATIENT)
Dept: RADIATION ONCOLOGY | Age: 52
Setting detail: RECURRING SERIES
Discharge: HOME | End: 2022-08-26
Attending: RADIOLOGY
Payer: COMMERCIAL

## 2022-08-23 PROCEDURE — 77412 RADIATION TX DELIVERY LVL 3: CPT

## 2022-08-23 PROCEDURE — 77336 RADIATION PHYSICS CONSULT: CPT

## 2022-08-23 PROCEDURE — 77387 GUIDANCE FOR RADJ TX DLVR: CPT

## 2022-08-24 ENCOUNTER — HOSPITAL ENCOUNTER (OUTPATIENT)
Dept: RADIATION ONCOLOGY | Age: 52
Setting detail: RECURRING SERIES
Discharge: HOME | End: 2022-08-27
Attending: RADIOLOGY
Payer: COMMERCIAL

## 2022-08-24 PROCEDURE — 77412 RADIATION TX DELIVERY LVL 3: CPT

## 2022-08-24 PROCEDURE — 77387 GUIDANCE FOR RADJ TX DLVR: CPT

## 2022-08-25 ENCOUNTER — HOSPITAL ENCOUNTER (OUTPATIENT)
Dept: RADIATION ONCOLOGY | Age: 52
Setting detail: RECURRING SERIES
Discharge: HOME | End: 2022-08-28
Attending: RADIOLOGY
Payer: COMMERCIAL

## 2022-08-25 PROCEDURE — 77387 GUIDANCE FOR RADJ TX DLVR: CPT

## 2022-08-25 PROCEDURE — 77412 RADIATION TX DELIVERY LVL 3: CPT

## 2022-08-26 ENCOUNTER — HOSPITAL ENCOUNTER (OUTPATIENT)
Dept: RADIATION ONCOLOGY | Age: 52
Setting detail: RECURRING SERIES
Discharge: HOME | End: 2022-08-29
Attending: RADIOLOGY
Payer: COMMERCIAL

## 2022-08-26 PROCEDURE — 77412 RADIATION TX DELIVERY LVL 3: CPT

## 2022-08-26 PROCEDURE — 77387 GUIDANCE FOR RADJ TX DLVR: CPT

## 2022-08-29 ENCOUNTER — HOSPITAL ENCOUNTER (OUTPATIENT)
Dept: RADIATION ONCOLOGY | Age: 52
Setting detail: RECURRING SERIES
Discharge: HOME | End: 2022-09-01
Attending: RADIOLOGY
Payer: COMMERCIAL

## 2022-08-29 ENCOUNTER — APPOINTMENT (OUTPATIENT)
Dept: RADIATION ONCOLOGY | Age: 52
End: 2022-08-29
Attending: RADIOLOGY
Payer: COMMERCIAL

## 2022-08-29 PROCEDURE — 77412 RADIATION TX DELIVERY LVL 3: CPT

## 2022-08-29 PROCEDURE — 77387 GUIDANCE FOR RADJ TX DLVR: CPT

## 2022-08-29 NOTE — ONCOLOGY
Patient: Perico Brown MRN: [id number]  SSN: [id number]    YOB: 1970  Age: 46 y.o. Sex: female      DIAGNOSIS:  breast cancer    TREATMENT SITE:  left chest wall    DOSE and FRACTIONATION:  4800/24    INTERVAL HISTORY:  Perico Brown is a 46 y.o. female being treated for breast cancer, has [id number] erythemia, to start gel sheets. Week 5      OBJECTIVE:  NAD  There were no vitals filed for this visit. Lab Results   Component Value Date/Time     04/29/2022 03:10 PM    K 4.0 04/29/2022 03:10 PM     04/29/2022 03:10 PM    CO2 29 04/29/2022 03:10 PM    BUN 13 04/29/2022 03:10 PM    GFRAA >60 04/29/2022 03:10 PM    GLOB 3.8 04/29/2022 03:10 PM    ALT 54 04/29/2022 03:10 PM     Lab Results   Component Value Date/Time    WBC 7.2 04/29/2022 03:10 PM    HGB 12.6 04/29/2022 03:10 PM    HCT 38.8 04/29/2022 03:10 PM     04/29/2022 03:10 PM       ASSESSMENT and PLAN:  Perico Brown is tolerating radiation as anticipated for the current dose and fraction. We will continue on as planned with another treatment visit anticipated next week.         Reyes Model, MD   August 29, 2022

## 2022-08-30 ENCOUNTER — HOSPITAL ENCOUNTER (OUTPATIENT)
Dept: RADIATION ONCOLOGY | Age: 52
Setting detail: RECURRING SERIES
Discharge: HOME | End: 2022-09-02
Attending: RADIOLOGY
Payer: COMMERCIAL

## 2022-08-30 PROCEDURE — 77336 RADIATION PHYSICS CONSULT: CPT

## 2022-08-30 PROCEDURE — 77387 GUIDANCE FOR RADJ TX DLVR: CPT

## 2022-08-30 PROCEDURE — 77412 RADIATION TX DELIVERY LVL 3: CPT

## 2022-08-31 ENCOUNTER — HOSPITAL ENCOUNTER (OUTPATIENT)
Dept: LAB | Age: 52
Discharge: HOME | End: 2022-09-03
Payer: COMMERCIAL

## 2022-08-31 ENCOUNTER — OFFICE VISIT (OUTPATIENT)
Dept: ONCOLOGY | Age: 52
End: 2022-08-31
Payer: COMMERCIAL

## 2022-08-31 VITALS
HEART RATE: 69 BPM | RESPIRATION RATE: 12 BRPM | OXYGEN SATURATION: 97 % | BODY MASS INDEX: 32.53 KG/M2 | WEIGHT: 183.6 LBS | TEMPERATURE: 98 F | SYSTOLIC BLOOD PRESSURE: 131 MMHG | DIASTOLIC BLOOD PRESSURE: 77 MMHG | HEIGHT: 63 IN

## 2022-08-31 DIAGNOSIS — Z17.0 MALIGNANT NEOPLASM OF LEFT BREAST IN FEMALE, ESTROGEN RECEPTOR POSITIVE, UNSPECIFIED SITE OF BREAST (HCC): Primary | ICD-10-CM

## 2022-08-31 DIAGNOSIS — N60.99 ATYPICAL DUCTAL HYPERPLASIA OF BREAST: ICD-10-CM

## 2022-08-31 DIAGNOSIS — C50.912 MALIGNANT NEOPLASM OF LEFT BREAST IN FEMALE, ESTROGEN RECEPTOR POSITIVE, UNSPECIFIED SITE OF BREAST (HCC): Primary | ICD-10-CM

## 2022-08-31 LAB
ALBUMIN SERPL-MCNC: 3.8 G/DL (ref 3.5–5)
ALBUMIN/GLOB SERPL: 0.8 {RATIO} (ref 1.2–3.5)
ALP SERPL-CCNC: 135 U/L (ref 50–136)
ALT SERPL-CCNC: 45 U/L (ref 12–65)
ANION GAP SERPL CALC-SCNC: 6 MMOL/L (ref 4–13)
AST SERPL-CCNC: 26 U/L (ref 15–37)
BASOPHILS # BLD: 0.1 K/UL (ref 0–0.2)
BASOPHILS NFR BLD: 1 % (ref 0–2)
BILIRUB SERPL-MCNC: 0.3 MG/DL (ref 0.2–1.1)
BUN SERPL-MCNC: 15 MG/DL (ref 6–23)
CALCIUM SERPL-MCNC: 9.8 MG/DL (ref 8.3–10.4)
CHLORIDE SERPL-SCNC: 103 MMOL/L (ref 101–110)
CO2 SERPL-SCNC: 28 MMOL/L (ref 21–32)
CREAT SERPL-MCNC: 0.9 MG/DL (ref 0.6–1)
DIFFERENTIAL METHOD BLD: ABNORMAL
EOSINOPHIL # BLD: 0.2 K/UL (ref 0–0.8)
EOSINOPHIL NFR BLD: 4 % (ref 0.5–7.8)
ERYTHROCYTE [DISTWIDTH] IN BLOOD BY AUTOMATED COUNT: 16 % (ref 11.9–14.6)
GLOBULIN SER CALC-MCNC: 4.5 G/DL (ref 2.3–3.5)
GLUCOSE SERPL-MCNC: 114 MG/DL (ref 65–100)
HCT VFR BLD AUTO: 40 % (ref 35.8–46.3)
HGB BLD-MCNC: 13.1 G/DL (ref 11.7–15.4)
IMM GRANULOCYTES # BLD AUTO: 0 K/UL (ref 0–0.5)
IMM GRANULOCYTES NFR BLD AUTO: 1 % (ref 0–5)
LYMPHOCYTES # BLD: 0.9 K/UL (ref 0.5–4.6)
LYMPHOCYTES NFR BLD: 23 % (ref 13–44)
MCH RBC QN AUTO: 27.1 PG (ref 26.1–32.9)
MCHC RBC AUTO-ENTMCNC: 32.8 G/DL (ref 31.4–35)
MCV RBC AUTO: 82.8 FL (ref 79.6–97.8)
MONOCYTES # BLD: 0.4 K/UL (ref 0.1–1.3)
MONOCYTES NFR BLD: 9 % (ref 4–12)
NEUTS SEG # BLD: 2.5 K/UL (ref 1.7–8.2)
NEUTS SEG NFR BLD: 62 % (ref 43–78)
NRBC # BLD: 0 K/UL (ref 0–0.2)
PLATELET # BLD AUTO: 379 K/UL (ref 150–450)
PMV BLD AUTO: 9.2 FL (ref 9.4–12.3)
POTASSIUM SERPL-SCNC: 3.3 MMOL/L (ref 3.5–5.1)
PROT SERPL-MCNC: 8.3 G/DL (ref 6.3–8.2)
RBC # BLD AUTO: 4.83 M/UL (ref 4.05–5.2)
SODIUM SERPL-SCNC: 137 MMOL/L (ref 136–145)
WBC # BLD AUTO: 4 K/UL (ref 4.3–11.1)

## 2022-08-31 PROCEDURE — 36415 COLL VENOUS BLD VENIPUNCTURE: CPT

## 2022-08-31 PROCEDURE — 85025 COMPLETE CBC W/AUTO DIFF WBC: CPT

## 2022-08-31 PROCEDURE — 99214 OFFICE O/P EST MOD 30 MIN: CPT | Performed by: INTERNAL MEDICINE

## 2022-08-31 PROCEDURE — 80053 COMPREHEN METABOLIC PANEL: CPT

## 2022-08-31 RX ORDER — LORAZEPAM 1 MG/1
1 TABLET ORAL DAILY PRN
COMMUNITY
Start: 2021-12-15

## 2022-08-31 RX ORDER — OMEPRAZOLE 20 MG/1
CAPSULE, DELAYED RELEASE ORAL
COMMUNITY
Start: 2022-07-30 | End: 2022-11-04 | Stop reason: SDUPTHER

## 2022-08-31 ASSESSMENT — PATIENT HEALTH QUESTIONNAIRE - PHQ9
SUM OF ALL RESPONSES TO PHQ QUESTIONS 1-9: 0
SUM OF ALL RESPONSES TO PHQ QUESTIONS 1-9: 0
1. LITTLE INTEREST OR PLEASURE IN DOING THINGS: 0
SUM OF ALL RESPONSES TO PHQ QUESTIONS 1-9: 0
SUM OF ALL RESPONSES TO PHQ9 QUESTIONS 1 & 2: 0
2. FEELING DOWN, DEPRESSED OR HOPELESS: 0
SUM OF ALL RESPONSES TO PHQ QUESTIONS 1-9: 0

## 2022-08-31 NOTE — PROGRESS NOTES
[facility] Hematology and Oncology: Office Visit Established Patient    Chief Complaint:    Chief Complaint   Patient presents with    Follow-up         History of Present Illness:  Ms. Olaf Joshi is a 46 y.o. female who returns today for management of breast cancer. She initially presented for a routine bilateral screening mammogram on 1/8/22 which identified left breast calcifications. Further evaluation with left breast diagnostic mammogram on 1/13/22 confirmed the  presence of clustered microcalcifications at the 3 o'clock position of the left breast, biopsy was recommended. This was completed on 1/14/22 with pathology revealing invasive ductal carcinoma, low grade, ER 98%/MN 80%, HER2 negative. MRI of the bilateral  breasts was completed on 1/18/22 demonstrating the left 3:00 breast cancer measuring up to 2.1 cm with no additional suspicious findings. She was referred to Dr. Renu Suarez on 1/27/22, she was referred to genetics but ultimately opted to forgo testing and decided on bilateral mastectomy. Path reviewed, this showed 18 mm of tumor in the left breast with 1 of 3 sentinel nodes positive for carcinoma. Right breast was benign. Despite the unexpected positive sentinel node, she still has [id number] disease  with only 1 node involved, so she would be appropriate for Oncotype Dx analysis for risk stratification. This has returned in the low risk range at 10, because she is over 50 she is squarely within a category of patients who did not benefit from adjuvant chemotherapy. Therefore, we will recommend antiestrogen therapy alone. She is surgically post-menopausal, so a prescription was given for anastrozole. Despite the bilateral mastectomy, I would like her to meet with radiation oncology to discuss adjuvant XRT given the positive node. Here for follow-up after radiation. This was completed yesterday and overall she tolerated therapy very well.   She has some tightness in the left expander and axilla but no skin separation and dermatitis is well controlled. She is somewhat anxious about starting endocrine therapy because of the risk of arthralgias. Review of Systems:  Constitutional: Negative. HENT: Negative. Eyes: Negative. Respiratory: Negative. Cardiovascular: Negative. Gastrointestinal: Negative. Genitourinary: Negative. Musculoskeletal: Negative. Skin: Negative. Neurological: Negative. Endo/Heme/Allergies: Negative. Psychiatric/Behavioral: Negative. All other systems reviewed and are negative. No Known Allergies  Past Medical History:   Diagnosis Date    Agatston coronary artery calcium score less than 100 03/2018    CAC 0    Cancer ([address]     Depression with anxiety     Essential hypertension     Family history of breast cancer     H/O right breast biopsy 6/2014, 7/2014    Menopause     Obesity, Class I, BMI 30-34.9     PUD (peptic ulcer disease)     Pulmonary nodules 03/2018    3 mm right upper lobe nodule and 2 mm left upper lobe nodule. Per pt no growth.      Sacral pain      Past Surgical History:   Procedure Laterality Date    BREAST BIOPSY Left 01/14/2022    stereo bx     BREAST BIOPSY  7/2/2014    RIGHT BREAST NEEDLE LOCALIZED BIOPSY X2    performed by Kelley Escalera MD at [facility]  06/2014    BREAST RECONSTRUCTION Bilateral 4/7/2022    BIALTERAL BREAST RECONSTRUCTION STAGE 1 WITH TISSUE EXPANDERS AND ADM performed by Nikki Jewell MD at [facility] N/A 4/11/2019    COLONOSCOPY/ 27 performed by Naa Cisneros DO at [address] [facility]  07/2018    [facility] LOC BREAST BIOPSY LEFT Left 1/14/2022    RANDEE STEROTACTIC LOC BREAST BIOPSY LEFT 1/14/2022 [facility]    MASTECTOMY Bilateral 4/7/2022    BILATERAL BREAST MASTECTOMY performed by Jed Nichols MD at [address] (CERVIX REMOVED)  08/18/2021    full hysterectomy    [address] US GUIDED CORE BREAST BIOPSY Right 2014    7:00 pisition: Fibrocystic mastopathy having moderate intraductal hyperplasia, apocrine metaplasia and microcalcification. US GUIDED CORE BREAST BIOPSY Right 2014    6:00 position Fragments of fibroadenoma, simple     Family History   Problem Relation Age of Onset    Heart Attack Father     Breast Cancer Maternal Grandmother 80    Breast Cancer Mother 67    Heart Disease Father     Breast Cancer Maternal Aunt 58    Cancer Mother     Hypertension Brother      Social History     Socioeconomic History    Marital status:      Spouse name: Not on file    Number of children: Not on file    Years of education: Not on file    Highest education level: Not on file   Occupational History    Not on file   Tobacco Use    Smoking status: Former     Packs/day: 0.50     Years: 15.00     Pack years: 7.50     Types: Cigarettes     Quit date: 1999     Years since quittin.5    Smokeless tobacco: Never   Substance and Sexual Activity    Alcohol use: No    Drug use: No    Sexual activity: Not on file   Other Topics Concern    Not on file   Social History Narrative    16 yr old son  in a MVA 2017. She has a 32 yr old daughter. She attends  [employer] Social Determinants of Health     Financial Resource Strain: Low Risk     Difficulty of Paying Living Expenses: Not hard at all   Food Insecurity: No Food Insecurity    Worried About Running Out of Food in the Last Year: Never true    Ran Out of Food in the Last Year: Never true   Transportation Needs: Not on file   Physical Activity: Not on file   Stress: Not on file   Social Connections: Not on file   Intimate Partner Violence: Not on file   Housing Stability: Not on file     Current Outpatient Medications   Medication Sig Dispense Refill    LORazepam (ATIVAN) 1 MG tablet Take 1 mg by mouth daily as needed.       omeprazole (PRILOSEC) 20 MG delayed release capsule TAKE 1 CAPSULE BY MOUTH EVERY DAY      hydrocortisone 2.5 % cream Apply topically 3 times daily for itching rash. 28 g 3    losartan-hydroCHLOROthiazide (HYZAAR) 50-12.5 MG per tablet Take 1 tablet by mouth in the morning. 90 tablet 1    desvenlafaxine succinate (PRISTIQ) 25 MG TB24 extended release tablet Take 1 tablet by mouth in the morning. 90 tablet 1     No current facility-administered medications for this visit. OBJECTIVE:  /77 (Site: Right Upper Arm, Position: Sitting, Cuff Size: Medium Adult)   Pulse 69   Temp 98 °F (36.7 °C) (Oral)   Resp 12   Ht 5' 3\" (1.6 m)   Wt 183 lb 9.6 oz (83.3 kg)   SpO2 97%   BMI 32.52 kg/m²     Physical Exam:  Constitutional: Well developed, well nourished female in no acute distress, sitting comfortably in the exam room chair. HEENT: Normocephalic and atraumatic. Sclerae anicteric. Neck supple without JVD. No thyromegaly present. Lymph node   No palpable submandibular, cervical, supraclavicular lymph nodes. Skin Warm and dry. No bruising and no rash noted. No erythema. No pallor. Respiratory Lungs are clear to auscultation bilaterally without wheezes, rales or rhonchi, normal air exchange without accessory muscle use. CVS Normal rate, regular rhythm and normal S1 and S2. No murmurs, gallops, or rubs. Abdomen Soft, nontender and nondistended, normoactive bowel sounds. No palpable mass. No hepatosplenomegaly. Neuro Grossly nonfocal with no obvious sensory or motor deficits. MSK Normal range of motion in general.  No edema and no tenderness. Psych Appropriate mood and affect.       Labs:  Recent Results (from the past 96 hour(s))   CBC with Auto Differential    Collection Time: 08/31/22  2:32 PM   Result Value Ref Range    WBC 4.0 (L) 4.3 - 11.1 K/uL    RBC 4.83 4.05 - 5.2 M/uL    Hemoglobin 13.1 11.7 - 15.4 g/dL    Hematocrit 40.0 35.8 - 46.3 %    MCV 82.8 79.6 - 97.8 FL    MCH 27.1 26.1 - 32.9 PG    MCHC 32.8 31.4 - 35.0 g/dL    RDW 16.0 (H) 11.9 - 14.6 % involved, so she would be appropriate for Oncotype Dx analysis for risk stratification. This has returned in the low risk range at 10, because she is over 50 she is squarely within a category of patients who did not benefit from adjuvant chemotherapy. Therefore, we will recommend antiestrogen therapy alone. She is surgically post-menopausal, so a prescription was given for anastrozole. Despite the bilateral mastectomy, I would like her to meet with radiation oncology to discuss adjuvant XRT given the positive node. Here for follow-up after radiation. This was completed yesterday and overall she tolerated therapy very well. She has some tightness in the left expander and axilla but no skin separation and dermatitis is well controlled. She is somewhat anxious about starting endocrine therapy because of the risk of arthralgias. Labs reviewed and unremarkable other than mild hypokalemia, likely from Hyzaar. She will wait a couple of weeks to recover from radiation, then start Arimidex. We again discussed side effects and I affirmed her concerns about toxicity, but explained that many if not most women did not have prohibitive Aes and that we would help manage the ones that do occur. However, because of her low Oncotype and her higher clinical risk due to the positive node, I insist that she attempt endocrine therapy for risk reduction. She understands and agrees to proceed. All questions were asked and answered to the best of my ability. F/u in 2-3 months to discuss tolerance to Arimidex.              Charlene Wisdom MD, MD  [facility] Hematology and Oncology  [address] [place] [address]  Office : [phone]  Fax : [phone]

## 2022-08-31 NOTE — PATIENT INSTRUCTIONS
Patient Instructions from Today's Visit    Reason for Visit:  Follow up visit    Plan:  -We do want you to take the Arimidex because in your situation that really is the best option for you to prevent this from coming back other places.      -We will monitor your bone density while on this medication    -We would not want you to start this medication until about 2 weeks after you complete radiation. Follow Up:   Follow up with Dr. Augustine Manual Visit on 08/31/2022   Component Date Value Ref Range Status    WBC 08/31/2022 4.0 (A) 4.3 - 11.1 K/uL Final    RBC 08/31/2022 4.83  4.05 - 5.2 M/uL Final    Hemoglobin 08/31/2022 13.1  11.7 - 15.4 g/dL Final    Hematocrit 08/31/2022 40.0  35.8 - 46.3 % Final    MCV 08/31/2022 82.8  79.6 - 97.8 FL Final    MCH 08/31/2022 27.1  26.1 - 32.9 PG Final    MCHC 08/31/2022 32.8  31.4 - 35.0 g/dL Final    RDW 08/31/2022 16.0 (A) 11.9 - 14.6 % Final    Platelets 25/49/8540 379  150 - 450 K/uL Final    MPV 08/31/2022 9.2 (A) 9.4 - 12.3 FL Final    nRBC 08/31/2022 0.00  0.0 - 0.2 K/uL Final    **Note: Absolute NRBC parameter is now reported with Hemogram**    Seg Neutrophils 08/31/2022 62  43 - 78 % Final    Lymphocytes 08/31/2022 23  13 - 44 % Final    Monocytes 08/31/2022 9  4.0 - 12.0 % Final    Eosinophils % 08/31/2022 4  0.5 - 7.8 % Final    Basophils 08/31/2022 1  0.0 - 2.0 % Final    Immature Granulocytes 08/31/2022 1  0.0 - 5.0 % Final    Segs Absolute 08/31/2022 2.5  1.7 - 8.2 K/UL Final    Absolute Lymph # 08/31/2022 0.9  0.5 - 4.6 K/UL Final    Absolute Mono # 08/31/2022 0.4  0.1 - 1.3 K/UL Final    Absolute Eos # 08/31/2022 0.2  0.0 - 0.8 K/UL Final    Basophils Absolute 08/31/2022 0.1  0.0 - 0.2 K/UL Final    Absolute Immature Granulocyte 08/31/2022 0.0  0.0 - 0.5 K/UL Final    Differential Type 08/31/2022 AUTOMATED    Final    Sodium 08/31/2022 137  136 - 145 mmol/L Final    Potassium 08/31/2022 3.3 (A) 3.5 - 5.1 mmol/L Final experiencing any of the above symptoms. Patient did express an interest in My Chart. My Chart log in information explained on the after visit summary printout at the . [facility] [address] desk.     Milton Martinez RN

## 2022-09-08 ENCOUNTER — HOSPITAL ENCOUNTER (OUTPATIENT)
Dept: MAMMOGRAPHY | Age: 52
Discharge: HOME | End: 2022-09-11
Payer: COMMERCIAL

## 2022-09-08 DIAGNOSIS — N60.99 ATYPICAL DUCTAL HYPERPLASIA OF BREAST: ICD-10-CM

## 2022-09-08 PROCEDURE — 77080 DXA BONE DENSITY AXIAL: CPT

## 2022-09-13 ENCOUNTER — OFFICE VISIT (OUTPATIENT)
Dept: RHEUMATOLOGY | Age: 52
End: 2022-09-13
Payer: COMMERCIAL

## 2022-09-13 VITALS
BODY MASS INDEX: 32.42 KG/M2 | HEART RATE: 83 BPM | WEIGHT: 183 LBS | SYSTOLIC BLOOD PRESSURE: 125 MMHG | DIASTOLIC BLOOD PRESSURE: 87 MMHG

## 2022-09-13 DIAGNOSIS — F41.8 OTHER SPECIFIED ANXIETY DISORDERS: ICD-10-CM

## 2022-09-13 DIAGNOSIS — E55.9 VITAMIN D DEFICIENCY, UNSPECIFIED: ICD-10-CM

## 2022-09-13 DIAGNOSIS — M54.16 RADICULOPATHY, LUMBAR REGION: ICD-10-CM

## 2022-09-13 DIAGNOSIS — M79.10 MYALGIA, UNSPECIFIED SITE: ICD-10-CM

## 2022-09-13 DIAGNOSIS — G62.9 NEUROPATHY: ICD-10-CM

## 2022-09-13 DIAGNOSIS — M53.3 SACROCOCCYGEAL DISORDERS, NOT ELSEWHERE CLASSIFIED: ICD-10-CM

## 2022-09-13 DIAGNOSIS — R53.83 OTHER FATIGUE: ICD-10-CM

## 2022-09-13 DIAGNOSIS — M15.9 GENERALIZED OSTEOARTHRITIS: Primary | ICD-10-CM

## 2022-09-13 DIAGNOSIS — M79.7 FIBROMYALGIA: ICD-10-CM

## 2022-09-13 PROCEDURE — 99215 OFFICE O/P EST HI 40 MIN: CPT | Performed by: INTERNAL MEDICINE

## 2022-09-13 RX ORDER — ERGOCALCIFEROL 1.25 MG/1
50000 CAPSULE ORAL WEEKLY
Qty: 12 CAPSULE | Refills: 0 | Status: SHIPPED | OUTPATIENT
Start: 2022-09-13

## 2022-09-13 RX ORDER — DESVENLAFAXINE 50 MG/1
50 TABLET, EXTENDED RELEASE ORAL DAILY
Qty: 90 TABLET | Refills: 0 | Status: SHIPPED | OUTPATIENT
Start: 2022-09-13

## 2022-09-13 RX ORDER — CYCLOBENZAPRINE HCL 5 MG
5 TABLET ORAL NIGHTLY PRN
Qty: 30 TABLET | Refills: 0 | Status: SHIPPED | OUTPATIENT
Start: 2022-09-13 | End: 2022-12-12

## 2022-09-13 RX ORDER — FAMOTIDINE 20 MG
TABLET ORAL
COMMUNITY

## 2022-09-13 RX ORDER — ANASTROZOLE 1 MG/1
1 TABLET ORAL DAILY
COMMUNITY

## 2022-09-13 RX ORDER — TRAMADOL HYDROCHLORIDE 50 MG/1
50 TABLET ORAL 2 TIMES DAILY PRN
Qty: 60 TABLET | Refills: 0 | Status: SHIPPED | OUTPATIENT
Start: 2022-09-13 | End: 2022-10-13

## 2022-09-13 NOTE — PROGRESS NOTES
Subjective:      HPI:          Permanent history      Mrs. Major is a 27-year-old  lady with past medical history significant for hypertension, GERD, obesity, depression, sacral pain and chronic myalgias and arthralgias who presents for evaluation. Patient reports longstanding muscular pain. Has constant body ache, radaites down shoulders, across shoulderblades, down into biceps, radiates down hips. Tried cymbatla, tail bone pain. Tried lyrica- still no relief. Always in pain. Able to do normal activites, but doing an all day things, she is in misery. Worst pain in back, shulders and hips. Tailbone and hip pain started in 2017. cymbalta or lisa did not help with the pain. Worst pain on rt side. Will have husbnad push in shoulder, feels like their are knots in her shoulder, but this causes pain. Now she takes pristiq and lyrica. But cannot take lyrica, makes her very sleepy, unale to work. Never tried gabapentin. Takes lyrica in pm. Takes pristiq in am. Switched from cymbalta to pristiq bc she was feeling depression. Taken pristiq for about a few months- has missed some dose, worse when she doesn't take it. When missed lrica, doesn't notice a difference in pain. Tried to get off pristiq bc she doesn't want to take meds, no side effects. Tried ibuprofen, didn't help- doesn't take it- has prescription for it. Hasn't tried tylenol. Pain in shoulders, back, down to tailbone. Feet will hurt first thing in the morning, but walking around it goes away- takes about 10 mins. No significant joint swelling. Symptoms worse with use better with rest.  Massage helps. Used to work out, had to stop bc of severe cramps from working out. A lot of movement makes it worse- joyce moving heavy furniture. Even doing normal household chores cause pain. This causes a spiral where she then gets upset, and sad cause she hates not being able to do these normal things again.  No injury that she knows that started any of this. pcp did mri of tailbone area, in 2017- hasnt had anything since. No family hisotry of rheum. 2 siblings- brother, sister- no rheum. 1 kid - killed in car accident. No miscarrigaes. Other kid is healthy. Since last visit       Taking pristiq 50mg qd, Vit D 1000qd  and Tram prn. Worst joint is hips. Pt has had headache last 3 days and having severe pain all over. Pt was diagnosed with b/l  breast cancer with mastectomy done. She is going to have reconstructive surgery done in the future. She will start Arimidex soon, her last radiation tx was 22 Bmd 22 in epic. Labs in Care. ROS:     Musculoskeletal ROS:   .    Abnormal:  joint pain, back pain, neck pain, stiff muscles, weak muscles and painful muscles  . AM stiffness (hours): 10-15 min  . Pain scale (0-10): 9  . Fatigue scale (0-10): 9  .    Job status: working   . Activities of daily living: no difficulty    positive as above with the addition of   Headaches   Otherwise negative for:  Fevers, chills, sweats. Weight  stable  No scalp tenderness. No jaw claudication. No acute visual changes. No red or dry eyes. No auditory complaints. No nasal discharge or rhinorrhea or dry mouth. No oral lesions or ulcers. No sore throat. No tongue pain. No cough. No shortness of breath, dyspnea on exertion or wheezing. No hemoptysis. No chest pains or palpitations. No lightheadedness. No pedal edema. No GERD symptoms, abdominal pain,diarrhea or constipation. No increased urinary frequency, nocturia, dysuria or changes in urine color. No alopecia, skin rashes/lesions. No changes in skin tightness. No Raynaud's. Photosensitivity. Current Outpatient Medications:     LORazepam (ATIVAN) 1 mg tablet, Take 1 mg by mouth daily as needed. , Disp: , Rfl:     losartan (COZAAR) 50 mg tablet, TAKE 1 TABLET BY MOUTH DAILY, Disp: 90 Tablet, Rfl: 1    omeprazole (PRILOSEC) 20 mg capsule, TABLET 1 CAPSULE BY MOUTH EVERY DAY, Disp: 90 Capsule, Rfl: 1    desvenlafaxine succinate (PRISTIQ) 50 mg ER tablet, Take 1 Tablet by mouth daily. , Disp: 90 Tablet, Rfl: 1    hydroCHLOROthiazide (HYDRODIURIL) 12.5 mg tablet, TAKE 1 TABLET BY MOUTH DAILY, Disp: 90 Tablet, Rfl: 1    carisoprodoL (SOMA) 350 mg tablet, Take 1 Tablet by mouth every eight (8) hours as needed for Muscle Spasm(s). Max Daily Amount: 1,050 mg., Disp: 15 Tablet, Rfl: 0    estradioL (VIVELLE) 0.1 mg/24 hr, 1 Patch by TransDERmal route two (2) times a week., Disp: , Rfl:     ergocalciferol (ERGOCALCIFEROL) 1,250 mcg (50,000 unit) capsule, Take 1 Capsule by mouth two (2) days a week., Disp: 24 Capsule, Rfl: 0  No current facility-administered medications for this visit. Allergies   Allergen Reactions    Citalopram Nausea and Vomiting    Bupropion Hcl Other (comments)    Fluoxetine Other (comments)     \"CRAZY THOUGHTS\"    Paroxetine Hcl Other (comments)     \"MADE SLEEPY\"       Patient Active Problem List   Diagnosis Code    Atypical ductal hyperplasia of breast N60.99    BMI 29.0-29.9,adult Z68.29    Sacral pain M53.3    Essential hypertension I10    Agatston coronary artery calcium score less than 100 R93.1    Pulmonary nodules R91.8    Obesity, Class I, BMI 30-34.9 E66.9    Gastroesophageal reflux disease without esophagitis K21.9    Depression with anxiety F41.8       Past Medical History:   Diagnosis Date    Agatston coronary artery calcium score less than 100 03/2018    CAC 0    Chronic Sacral pain     Depression with anxiety     Essential hypertension     Family history of breast cancer     Menopause     Obesity, Class I, BMI 30-34.9     Pulmonary nodules 03/2018    3 mm right upper lobe nodule and 2 mm left upper lobe nodule. Per pt no growth.      Right breast biopsy 6/2014, 7/2014       Past Surgical History:   Procedure Laterality Date    COLONOSCOPY N/A 4/11/2019    COLONOSCOPY/ 27 performed by Jenifer Perez DO at [facility] BREAST BIOPSY  06/2014    HX BREAST BIOPSY  2014    RIGHT BREAST NEEDLE LOCALIZED BIOPSY X2    performed by Rand Mccrary MD at [facility]    HX BREAST BIOPSY Left 2022    stereo bx     HX  SECTION      HX HYSTERECTOMY  2021    HX HYSTEROSCOPY WITH ENDOMETRIAL ABLATION  2018    HX JOSÉ MIGUEL AND BSO  2021    full hysterectomy    HX TUBAL LIGATION      US GUIDED CORE BREAST BIOPSY Right 2014    6:00 position Fragments of fibroadenoma, simple    US GUIDED CORE BREAST BIOPSY Right 2014    7:00 pisition: Fibrocystic mastopathy having moderate intraductal hyperplasia, apocrine metaplasia and microcalcification. Family History   Problem Relation Age of Onset    Heart Disease Father     Heart Attack Father     Cancer Mother     Breast Cancer Mother 67    Breast Cancer Maternal Grandmother 80    Hypertension Brother     Breast Cancer Maternal Aunt 58       Social History     Socioeconomic History    Marital status:     Number of children: 2   Occupational History    Occupation: [employer]   Tobacco Use    Smoking status: Former Smoker     Packs/day: 0.50     Years: 6.00     Pack years: 3.00     Types: Cigarettes     Quit date: 1999     Years since quittin.9    Smokeless tobacco: Never Used   Vaping Use    Vaping Use: Never used   Substance and Sexual Activity    Alcohol use: No    Drug use: No    Sexual activity: Yes     Partners: Male     Birth control/protection: None, Surgical   Social History Narrative    16 yr old son  in a MVA 2017. She has a 32 yr old daughter. She attends  [employer].                      Objective:      Vitals:    22 1030   BP: 125/87   Pulse: 83   Weight: 183 lb (83 kg)         PHYSICAL EXAMINATION:         General: alert, healthy, well nourished, pleasant, no acute distress   Lungs: clear to auscultation bilaterally without wheezes,    Heart: regular rate & rhythm, +S1, +S2, no murmurs   Extremities: no clubbing, cyanosis, or edema  Neuro: grossly non-focal, and although conversing fluently very tearful from extensive stressors    MUSCULOSKELETAL:   -Hands: normal   -Wrists: normal   -Elbows: normal   -Shoulders: normal   -Neck: normal   -Back: normal   -Hips: normal   -Knees: normal   -Ankles: normal   -Feet: normal    Swollen Joint Count:0  Tender Joint Count:0    Diffuse soft tissue and bony TP       Assessment:       Mrs. Rigo Escalante is a 46year-old  lady with past medical history significant for hypertension, GERD, obesity, depression, sacral pain and chronic myalgias and arthralgias who presents for fu. Work up previously normal CBC, CMP, TSH, CCP, SSA SSB, AMPARO, normal RF, CK, hep panel, Xrays show DJD changes. Very low vit d -on replacement . Clinical picture more suggestive of noninflammatory process - all work up for autoimmune process normal.  Haresh Goods made her sleepy thus switched to pristiq  ,  continue with tylenol/tramadol. New breast cancer diagnosis since last visit, reviewed her chart at length  - patient has undergone extensive treatments including bilateral mastectomy, radiation and is pending starting Arimidex. Today understandably very stressed from accumulation of all recent health concerns, which is made sleep more difficult, increased myalgias, and in reviewing medications patient was recently decreased on her Pristiq per her request.  Discussed at length need to increase Pristiq for now and treat pain from multidisciplinary aspect -which involves tramadol and Flexeril as needed. Total visit time 43 minutes, greater than half of the time was spent on counseling. Plan:       1. Tramadol 50mg upto twice day as needed - #60  2. Vit d 26726 units once weekly for now   3. increase pristiq 50mg daily   4.  tylenol 1000mg every am and pm - scheduled   5. Flexeril 5mg once daily at bedtime as needed - #30   6.  RTC in 2-3 months - call if any issues with vit d

## 2022-09-13 NOTE — PATIENT INSTRUCTIONS
1. Tramadol 50mg upto twice day as needed - #60  2. Vit d 37577 units once weekly for now   3. increase pristiq 50mg daily   4.  tylenol 1000mg every am and pm - scheduled   5. Flexeril 5mg once daily at bedtime as needed - #30   6.  RTC in 2-3 months - call if any issues with vit d

## 2022-10-14 RX ORDER — FLUCONAZOLE 150 MG/1
150 TABLET ORAL ONCE
Qty: 1 TABLET | Refills: 0 | Status: SHIPPED | OUTPATIENT
Start: 2022-10-14 | End: 2022-10-14

## 2022-11-04 ENCOUNTER — OFFICE VISIT (OUTPATIENT)
Dept: INTERNAL MEDICINE CLINIC | Facility: CLINIC | Age: 52
End: 2022-11-04
Payer: COMMERCIAL

## 2022-11-04 VITALS
OXYGEN SATURATION: 95 % | SYSTOLIC BLOOD PRESSURE: 115 MMHG | TEMPERATURE: 98.1 F | DIASTOLIC BLOOD PRESSURE: 54 MMHG | HEIGHT: 63 IN | HEART RATE: 78 BPM | BODY MASS INDEX: 32.43 KG/M2 | WEIGHT: 183 LBS

## 2022-11-04 DIAGNOSIS — K21.9 GASTROESOPHAGEAL REFLUX DISEASE, UNSPECIFIED WHETHER ESOPHAGITIS PRESENT: ICD-10-CM

## 2022-11-04 DIAGNOSIS — I10 ESSENTIAL HYPERTENSION: Primary | ICD-10-CM

## 2022-11-04 DIAGNOSIS — K82.8 BILIARY DYSKINESIA: ICD-10-CM

## 2022-11-04 DIAGNOSIS — F41.8 DEPRESSION WITH ANXIETY: ICD-10-CM

## 2022-11-04 DIAGNOSIS — N60.99 ATYPICAL DUCTAL HYPERPLASIA OF BREAST: ICD-10-CM

## 2022-11-04 PROCEDURE — 99214 OFFICE O/P EST MOD 30 MIN: CPT | Performed by: INTERNAL MEDICINE

## 2022-11-04 PROCEDURE — 3074F SYST BP LT 130 MM HG: CPT | Performed by: INTERNAL MEDICINE

## 2022-11-04 PROCEDURE — 3078F DIAST BP <80 MM HG: CPT | Performed by: INTERNAL MEDICINE

## 2022-11-04 RX ORDER — LORAZEPAM 1 MG/1
1 TABLET ORAL DAILY PRN
Qty: 30 TABLET | Refills: 0 | Status: CANCELLED | OUTPATIENT
Start: 2022-11-04

## 2022-11-04 RX ORDER — LOSARTAN POTASSIUM AND HYDROCHLOROTHIAZIDE 12.5; 5 MG/1; MG/1
1 TABLET ORAL
Qty: 90 TABLET | Refills: 1 | Status: SHIPPED | OUTPATIENT
Start: 2022-11-04

## 2022-11-04 RX ORDER — DESVENLAFAXINE 50 MG/1
50 TABLET, EXTENDED RELEASE ORAL DAILY
Qty: 90 TABLET | Refills: 0 | Status: CANCELLED | OUTPATIENT
Start: 2022-11-04

## 2022-11-04 RX ORDER — OMEPRAZOLE 20 MG/1
CAPSULE, DELAYED RELEASE ORAL
Qty: 90 CAPSULE | Refills: 1 | Status: SHIPPED | OUTPATIENT
Start: 2022-11-04

## 2022-11-04 ASSESSMENT — PATIENT HEALTH QUESTIONNAIRE - PHQ9
9. THOUGHTS THAT YOU WOULD BE BETTER OFF DEAD, OR OF HURTING YOURSELF: 0
6. FEELING BAD ABOUT YOURSELF - OR THAT YOU ARE A FAILURE OR HAVE LET YOURSELF OR YOUR FAMILY DOWN: 0
1. LITTLE INTEREST OR PLEASURE IN DOING THINGS: 0
4. FEELING TIRED OR HAVING LITTLE ENERGY: 0
SUM OF ALL RESPONSES TO PHQ9 QUESTIONS 1 & 2: 0
SUM OF ALL RESPONSES TO PHQ QUESTIONS 1-9: 0
3. TROUBLE FALLING OR STAYING ASLEEP: 0
SUM OF ALL RESPONSES TO PHQ QUESTIONS 1-9: 0
8. MOVING OR SPEAKING SO SLOWLY THAT OTHER PEOPLE COULD HAVE NOTICED. OR THE OPPOSITE, BEING SO FIGETY OR RESTLESS THAT YOU HAVE BEEN MOVING AROUND A LOT MORE THAN USUAL: 0
2. FEELING DOWN, DEPRESSED OR HOPELESS: 0
10. IF YOU CHECKED OFF ANY PROBLEMS, HOW DIFFICULT HAVE THESE PROBLEMS MADE IT FOR YOU TO DO YOUR WORK, TAKE CARE OF THINGS AT HOME, OR GET ALONG WITH OTHER PEOPLE: 0
7. TROUBLE CONCENTRATING ON THINGS, SUCH AS READING THE NEWSPAPER OR WATCHING TELEVISION: 0
5. POOR APPETITE OR OVEREATING: 0

## 2022-11-04 ASSESSMENT — ENCOUNTER SYMPTOMS
ABDOMINAL PAIN: 1
SHORTNESS OF BREATH: 0

## 2022-11-04 NOTE — PROGRESS NOTES
Adelaide Carson M.D. Internal Medicine  [address] , [address]   Office : [phone]  Fax : [phone]    Chief Complaint   Patient presents with    Hypertension     3 mo follow up         History of Present Illness: Karime Garcia is a 45 y.o. female. HPI    Hypertension  Patient is in for Hypertension which is stable. Diet and Lifestyle: generally follows a low sodium diet, exercises sporadically  Home BP Monitoring: is not measured at home. Patient's recent blood pressures were previously   BP Readings from Last 3 Encounters:   11/04/22 (!) 115/54   09/13/22 125/87   08/31/22 131/77   . taking medications as instructed, no medication side effects noted, no TIA's, no chest pain on exertion, no dyspnea on exertion, no swelling of ankles. Cardiac risk factors consist of hypertension, stress, post-menopausal.     Lab Results   Component Value Date/Time     08/31/2022 02:32 PM    K 3.3 08/31/2022 02:32 PM     08/31/2022 02:32 PM    CO2 28 08/31/2022 02:32 PM    BUN 15 08/31/2022 02:32 PM    GFRAA >60 08/31/2022 02:32 PM        GERD  Controlled with intermittent use of omprazole    Depression with anxiety  Pristiq decreased and rheumatologist increased it back to 50 mg    Biliary Dyskinesia  Abdominal surgery post-poned due to recent diagnosis of breast cancer. Still having intermittent pain    Breast Cancer  Had bilateral mastectomies with lymph node dissection and finished RT.   Starting arimedex but is having side effects    Past Medical History:  Past Medical History:   Diagnosis Date    Agatston coronary artery calcium score less than 100 03/2018    CAC 0    Cancer ([facility] [address]     Depression with anxiety     Essential hypertension     Family history of breast cancer     H/O right breast biopsy 6/2014, 7/2014    Menopause     Obesity, Class I, BMI 30-34.9     PUD (peptic ulcer disease)     Pulmonary nodules 03/2018    3 mm right upper lobe nodule and 2 mm left upper lobe nodule. Per pt no growth.  Sacral pain      Past Surgical History:  Past Surgical History:   Procedure Laterality Date    BREAST BIOPSY Left 01/14/2022    stereo bx     BREAST BIOPSY  7/2/2014    RIGHT BREAST NEEDLE LOCALIZED BIOPSY X2    performed by Elvira Parker MD at [facility]  06/2014    BREAST RECONSTRUCTION Bilateral 4/7/2022    BIALTERAL BREAST RECONSTRUCTION STAGE 1 WITH TISSUE EXPANDERS AND ADM performed by Torsten Michael MD at [place] [address] COLONOSCOPY N/A 4/11/2019    COLONOSCOPY/ 27 performed by Rosy Noguera DO at [place] [address]  07/2018    RANDEE STEROTACTIC LOC BREAST BIOPSY LEFT Left 1/14/2022    RANDEE STEROTACTIC LOC BREAST BIOPSY LEFT 1/14/2022 [facility]    MASTECTOMY Bilateral 4/7/2022    BILATERAL BREAST MASTECTOMY performed by Trevin Peguero MD at [address] [place] [address] (CERVIX REMOVED)  08/18/2021    full hysterectomy    TUBAL LIGATION  1999    US GUIDED CORE BREAST BIOPSY Right 6/5/2014    7:00 pisition: Fibrocystic mastopathy having moderate intraductal hyperplasia, apocrine metaplasia and microcalcification.     US GUIDED CORE BREAST BIOPSY Right 6/5/2014    6:00 position Fragments of fibroadenoma, simple     Allergies:   No Known Allergies  Medications:   Current Outpatient Medications   Medication Sig Dispense Refill    losartan-hydroCHLOROthiazide (HYZAAR) 50-12.5 MG per tablet Take 1 tablet by mouth Daily with supper 90 tablet 1    omeprazole (PRILOSEC) 20 MG delayed release capsule TAKE 1 CAPSULE BY MOUTH EVERY DAY 90 capsule 1    anastrozole (ARIMIDEX) 1 MG tablet Take 1 mg by mouth daily      desvenlafaxine succinate (PRISTIQ) 50 MG TB24 extended release tablet Take 1 tablet by mouth daily 90 tablet 0    vitamin D (ERGOCALCIFEROL) 1.25 MG (89746 UT) CAPS capsule Take 1 capsule by mouth once a week 12 capsule 0    LORazepam (ATIVAN) 1 MG tablet Take 1 mg by mouth daily as needed.  hydrocortisone 2.5 % cream Apply topically 3 times daily for itching rash. 28 g 3    Vitamin D, Cholecalciferol, 25 MCG (1000 UT) CAPS Take by mouth (Patient not taking: Reported on 2022)      cyclobenzaprine (FLEXERIL) 5 MG tablet Take 1 tablet by mouth nightly as needed for Muscle spasms (Patient not taking: Reported on 2022) 30 tablet 0    desvenlafaxine succinate (PRISTIQ) 25 MG TB24 extended release tablet Take 1 tablet by mouth in the morning. (Patient not taking: Reported on 2022) 90 tablet 1     No current facility-administered medications for this visit. Social History:  Social History     Tobacco Use    Smoking status: Former     Packs/day: 0.50     Years: 15.00     Pack years: 7.50     Types: Cigarettes     Quit date: 1999     Years since quittin.7    Smokeless tobacco: Never   Substance Use Topics    Alcohol use: No     Family History  Family History   Problem Relation Age of Onset    Heart Attack Father     Breast Cancer Maternal Grandmother 80    Breast Cancer Mother 67    Heart Disease Father     Breast Cancer Maternal Aunt 58    Cancer Mother     Hypertension Brother        Review of Systems   Constitutional:  Negative for chills, fatigue and fever. Respiratory:  Negative for shortness of breath. Cardiovascular:  Negative for chest pain. Gastrointestinal:  Positive for abdominal pain. Musculoskeletal:  Positive for arthralgias and myalgias. Negative for gait problem. Skin:  Negative for rash. Neurological:  Negative for speech difficulty. Psychiatric/Behavioral:  Negative for dysphoric mood. Vital Signs  BP (!) 115/54 (Site: Left Upper Arm, Position: Sitting, Cuff Size: Large Adult)   Pulse 78   Temp 98.1 °F (36.7 °C) (Temporal)   Ht 5' 3\" (1.6 m)   Wt 183 lb (83 kg)   SpO2 95%   BMI 32.42 kg/m²   Body mass index is 32.42 kg/m².     Physical Exam  Vitals reviewed. Constitutional:       General: She is not in acute distress. Appearance: Normal appearance. She is not ill-appearing. HENT:      Head: Normocephalic and atraumatic. Eyes:      General: No scleral icterus. Conjunctiva/sclera: Conjunctivae normal.   Neck:      Vascular: No carotid bruit. Cardiovascular:      Rate and Rhythm: Normal rate and regular rhythm. Heart sounds: Normal heart sounds. No murmur heard. Pulmonary:      Effort: Pulmonary effort is normal.      Breath sounds: Normal breath sounds. Musculoskeletal:         General: No swelling. Skin:     Coloration: Skin is not jaundiced. Findings: No rash. Neurological:      General: No focal deficit present. Mental Status: She is alert. Mental status is at baseline. Cranial Nerves: No cranial nerve deficit. Motor: No weakness. Gait: Gait normal.   Psychiatric:         Mood and Affect: Mood normal.         Behavior: Behavior normal.         Thought Content: Thought content normal.         Judgment: Judgment normal.         Assessment/Plan:  Fernandez Mallory was seen today for hypertension. Diagnoses and all orders for this visit:    Essential hypertension  -     losartan-hydroCHLOROthiazide (HYZAAR) 50-12.5 MG per tablet; Take 1 tablet by mouth Daily with supper    Gastroesophageal reflux disease, unspecified whether esophagitis present  -     omeprazole (PRILOSEC) 20 MG delayed release capsule; TAKE 1 CAPSULE BY MOUTH EVERY DAY    Depression with anxiety    Biliary dyskinesia    Atypical ductal hyperplasia of breast      Return in about 3 months (around 2/4/2023), or if symptoms worsen or fail to improve.   __  Richie De La Torre M.D.

## 2022-11-23 DIAGNOSIS — Z80.3 FAMILY HISTORY OF MALIGNANT NEOPLASM OF BREAST: ICD-10-CM

## 2022-11-23 DIAGNOSIS — C50.912 MALIGNANT NEOPLASM OF LEFT BREAST IN FEMALE, ESTROGEN RECEPTOR POSITIVE, UNSPECIFIED SITE OF BREAST (HCC): Primary | ICD-10-CM

## 2022-11-23 DIAGNOSIS — Z17.0 MALIGNANT NEOPLASM OF LEFT BREAST IN FEMALE, ESTROGEN RECEPTOR POSITIVE, UNSPECIFIED SITE OF BREAST (HCC): Primary | ICD-10-CM

## 2022-11-23 DIAGNOSIS — C77.3 SECONDARY AND UNSPECIFIED MALIGNANT NEOPLASM OF AXILLA AND UPPER LIMB LYMPH NODES (HCC): ICD-10-CM

## 2022-11-23 DIAGNOSIS — Z17.0 ESTROGEN RECEPTOR POSITIVE STATUS (ER+): ICD-10-CM

## 2022-11-23 DIAGNOSIS — C50.919 MALIGNANT NEOPLASM OF FEMALE BREAST, UNSPECIFIED ESTROGEN RECEPTOR STATUS, UNSPECIFIED LATERALITY, UNSPECIFIED SITE OF BREAST (HCC): ICD-10-CM

## 2022-11-23 DIAGNOSIS — N60.99 ATYPICAL DUCTAL HYPERPLASIA OF BREAST: ICD-10-CM

## 2022-11-23 DIAGNOSIS — C50.912 MALIGNANT NEOPLASM OF LEFT FEMALE BREAST, UNSPECIFIED ESTROGEN RECEPTOR STATUS, UNSPECIFIED SITE OF BREAST (HCC): ICD-10-CM

## 2022-11-29 ENCOUNTER — OFFICE VISIT (OUTPATIENT)
Dept: ONCOLOGY | Age: 52
End: 2022-11-29
Payer: COMMERCIAL

## 2022-11-29 ENCOUNTER — HOSPITAL ENCOUNTER (OUTPATIENT)
Dept: LAB | Age: 52
Discharge: HOME | End: 2022-12-02
Payer: COMMERCIAL

## 2022-11-29 VITALS
OXYGEN SATURATION: 94 % | WEIGHT: 180.7 LBS | DIASTOLIC BLOOD PRESSURE: 70 MMHG | HEIGHT: 63 IN | TEMPERATURE: 98.8 F | HEART RATE: 90 BPM | RESPIRATION RATE: 16 BRPM | SYSTOLIC BLOOD PRESSURE: 113 MMHG | BODY MASS INDEX: 32.02 KG/M2

## 2022-11-29 DIAGNOSIS — C50.919 MALIGNANT NEOPLASM OF FEMALE BREAST, UNSPECIFIED ESTROGEN RECEPTOR STATUS, UNSPECIFIED LATERALITY, UNSPECIFIED SITE OF BREAST (HCC): ICD-10-CM

## 2022-11-29 DIAGNOSIS — C77.3 SECONDARY AND UNSPECIFIED MALIGNANT NEOPLASM OF AXILLA AND UPPER LIMB LYMPH NODES (HCC): ICD-10-CM

## 2022-11-29 DIAGNOSIS — Z17.0 MALIGNANT NEOPLASM OF LEFT BREAST IN FEMALE, ESTROGEN RECEPTOR POSITIVE, UNSPECIFIED SITE OF BREAST (HCC): ICD-10-CM

## 2022-11-29 DIAGNOSIS — C50.912 MALIGNANT NEOPLASM OF LEFT BREAST IN FEMALE, ESTROGEN RECEPTOR POSITIVE, UNSPECIFIED SITE OF BREAST (HCC): ICD-10-CM

## 2022-11-29 DIAGNOSIS — N61.0 CELLULITIS OF BREAST: ICD-10-CM

## 2022-11-29 DIAGNOSIS — N60.99 ATYPICAL DUCTAL HYPERPLASIA OF BREAST: ICD-10-CM

## 2022-11-29 DIAGNOSIS — Z80.3 FAMILY HISTORY OF MALIGNANT NEOPLASM OF BREAST: ICD-10-CM

## 2022-11-29 DIAGNOSIS — C50.912 MALIGNANT NEOPLASM OF LEFT FEMALE BREAST, UNSPECIFIED ESTROGEN RECEPTOR STATUS, UNSPECIFIED SITE OF BREAST (HCC): ICD-10-CM

## 2022-11-29 DIAGNOSIS — C50.912 MALIGNANT NEOPLASM OF LEFT BREAST IN FEMALE, ESTROGEN RECEPTOR POSITIVE, UNSPECIFIED SITE OF BREAST (HCC): Primary | ICD-10-CM

## 2022-11-29 DIAGNOSIS — Z17.0 MALIGNANT NEOPLASM OF LEFT BREAST IN FEMALE, ESTROGEN RECEPTOR POSITIVE, UNSPECIFIED SITE OF BREAST (HCC): Primary | ICD-10-CM

## 2022-11-29 DIAGNOSIS — Z17.0 ESTROGEN RECEPTOR POSITIVE STATUS (ER+): ICD-10-CM

## 2022-11-29 LAB
ALBUMIN SERPL-MCNC: 3.8 G/DL (ref 3.5–5)
ALBUMIN/GLOB SERPL: 0.8 {RATIO} (ref 0.4–1.6)
ALP SERPL-CCNC: 156 U/L (ref 50–136)
ALT SERPL-CCNC: 43 U/L (ref 12–65)
ANION GAP SERPL CALC-SCNC: 6 MMOL/L (ref 2–11)
AST SERPL-CCNC: 36 U/L (ref 15–37)
BASOPHILS # BLD: 0 K/UL (ref 0–0.2)
BASOPHILS NFR BLD: 1 % (ref 0–2)
BILIRUB SERPL-MCNC: 0.3 MG/DL (ref 0.2–1.1)
BUN SERPL-MCNC: 14 MG/DL (ref 6–23)
CALCIUM SERPL-MCNC: 10 MG/DL (ref 8.3–10.4)
CHLORIDE SERPL-SCNC: 102 MMOL/L (ref 101–110)
CO2 SERPL-SCNC: 29 MMOL/L (ref 21–32)
CREAT SERPL-MCNC: 0.9 MG/DL (ref 0.6–1)
DIFFERENTIAL METHOD BLD: ABNORMAL
EOSINOPHIL # BLD: 0.2 K/UL (ref 0–0.8)
EOSINOPHIL NFR BLD: 2 % (ref 0.5–7.8)
ERYTHROCYTE [DISTWIDTH] IN BLOOD BY AUTOMATED COUNT: 15.9 % (ref 11.9–14.6)
GLOBULIN SER CALC-MCNC: 4.6 G/DL (ref 2.8–4.5)
GLUCOSE SERPL-MCNC: 104 MG/DL (ref 65–100)
HCT VFR BLD AUTO: 41.7 % (ref 35.8–46.3)
HGB BLD-MCNC: 13.4 G/DL (ref 11.7–15.4)
IMM GRANULOCYTES # BLD AUTO: 0 K/UL (ref 0–0.5)
IMM GRANULOCYTES NFR BLD AUTO: 0 % (ref 0–5)
LYMPHOCYTES # BLD: 2.2 K/UL (ref 0.5–4.6)
LYMPHOCYTES NFR BLD: 29 % (ref 13–44)
MCH RBC QN AUTO: 26.7 PG (ref 26.1–32.9)
MCHC RBC AUTO-ENTMCNC: 32.1 G/DL (ref 31.4–35)
MCV RBC AUTO: 83.2 FL (ref 82–102)
MONOCYTES # BLD: 0.4 K/UL (ref 0.1–1.3)
MONOCYTES NFR BLD: 6 % (ref 4–12)
NEUTS SEG # BLD: 4.6 K/UL (ref 1.7–8.2)
NEUTS SEG NFR BLD: 62 % (ref 43–78)
NRBC # BLD: 0 K/UL (ref 0–0.2)
PLATELET # BLD AUTO: 461 K/UL (ref 150–450)
PMV BLD AUTO: 9 FL (ref 9.4–12.3)
POTASSIUM SERPL-SCNC: 4 MMOL/L (ref 3.5–5.1)
PROT SERPL-MCNC: 8.4 G/DL (ref 6.3–8.2)
RBC # BLD AUTO: 5.01 M/UL (ref 4.05–5.2)
SODIUM SERPL-SCNC: 137 MMOL/L (ref 133–143)
WBC # BLD AUTO: 7.4 K/UL (ref 4.3–11.1)

## 2022-11-29 PROCEDURE — 80053 COMPREHEN METABOLIC PANEL: CPT

## 2022-11-29 PROCEDURE — 85025 COMPLETE CBC W/AUTO DIFF WBC: CPT

## 2022-11-29 PROCEDURE — 99214 OFFICE O/P EST MOD 30 MIN: CPT | Performed by: INTERNAL MEDICINE

## 2022-11-29 PROCEDURE — 3074F SYST BP LT 130 MM HG: CPT | Performed by: INTERNAL MEDICINE

## 2022-11-29 PROCEDURE — 3078F DIAST BP <80 MM HG: CPT | Performed by: INTERNAL MEDICINE

## 2022-11-29 PROCEDURE — 36415 COLL VENOUS BLD VENIPUNCTURE: CPT

## 2022-11-29 RX ORDER — LETROZOLE 2.5 MG/1
2.5 TABLET, FILM COATED ORAL DAILY
Qty: 30 TABLET | Refills: 5 | Status: SHIPPED | OUTPATIENT
Start: 2022-11-29

## 2022-11-29 ASSESSMENT — PATIENT HEALTH QUESTIONNAIRE - PHQ9
SUM OF ALL RESPONSES TO PHQ9 QUESTIONS 1 & 2: 0
2. FEELING DOWN, DEPRESSED OR HOPELESS: 0
1. LITTLE INTEREST OR PLEASURE IN DOING THINGS: 0
SUM OF ALL RESPONSES TO PHQ QUESTIONS 1-9: 0

## 2022-11-29 NOTE — PATIENT INSTRUCTIONS
Patient Instructions from Today's Visit    Reason for Visit:  Follow up breast cancer    Diagnosis Information:  https://www.[employer]/. net/about-us/asco-answers-patient-education-materials/vmaa-vpdrtdk-ptnw-sheets    Plan:  Discussed side effects of Arimidex  Stop Arimidex for 1 week and see if the side effects stop  Start Femara once daily     Follow Up: Follow up in 4 weeks with labs prior    Recent Lab Results:  Hospital Outpatient Visit on 11/29/2022   Component Date Value Ref Range Status    Sodium 11/29/2022 137  133 - 143 mmol/L Final    Potassium 11/29/2022 4.0  3.5 - 5.1 mmol/L Final    Chloride 11/29/2022 102  101 - 110 mmol/L Final    CO2 11/29/2022 29  21 - 32 mmol/L Final    Anion Gap 11/29/2022 6  2 - 11 mmol/L Final    Glucose 11/29/2022 104 (A)  65 - 100 mg/dL Final    BUN 11/29/2022 14  6 - 23 MG/DL Final    Creatinine 11/29/2022 0.90  0.6 - 1.0 MG/DL Final    Est, Glom Filt Rate 11/29/2022 >60  >60 ml/min/1.73m2 Final    Comment:      Pediatric calculator link: [employer]. org/professionals/kdoqi/gfr_calculatorped       Effective Oct 3, 2022       These results are not intended for use in patients <25years of age. eGFR results are calculated without a race factor using  the 2021 CKD-EPI equation. Careful clinical correlation is recommended, particularly when comparing to results calculated using previous equations. The CKD-EPI equation is less accurate in patients with extremes of muscle mass, extra-renal metabolism of creatinine, excessive creatine ingestion, or following therapy that affects renal tubular secretion.       Calcium 11/29/2022 10.0  8.3 - 10.4 MG/DL Final    Total Bilirubin 11/29/2022 0.3  0.2 - 1.1 MG/DL Final    ALT 11/29/2022 43  12 - 65 U/L Final    AST 11/29/2022 36  15 - 37 U/L Final    Alk Phosphatase 11/29/2022 156 (A)  50 - 136 U/L Final    Total Protein 11/29/2022 8.4 (A)  6.3 - 8.2 g/dL Final    Albumin 11/29/2022 3.8  3.5 - 5.0 g/dL Final    Globulin 11/29/2022 4.6 (A)  2.8 - 4.5 g/dL Final    Albumin/Globulin Ratio 11/29/2022 0.8  0.4 - 1.6   Final    WBC 11/29/2022 7.4  4.3 - 11.1 K/uL Final    RBC 11/29/2022 5.01  4.05 - 5.2 M/uL Final    Hemoglobin 11/29/2022 13.4  11.7 - 15.4 g/dL Final    Hematocrit 11/29/2022 41.7  35.8 - 46.3 % Final    MCV 11/29/2022 83.2  82.0 - 102.0 FL Final    MCH 11/29/2022 26.7  26.1 - 32.9 PG Final    MCHC 11/29/2022 32.1  31.4 - 35.0 g/dL Final    RDW 11/29/2022 15.9 (A)  11.9 - 14.6 % Final    Platelets 70/83/2449 461 (A)  150 - 450 K/uL Final    MPV 11/29/2022 9.0 (A)  9.4 - 12.3 FL Final    nRBC 11/29/2022 0.00  0.0 - 0.2 K/uL Final    **Note: Absolute NRBC parameter is now reported with Hemogram**    Seg Neutrophils 11/29/2022 62  43 - 78 % Final    Lymphocytes 11/29/2022 29  13 - 44 % Final    Monocytes 11/29/2022 6  4.0 - 12.0 % Final    Eosinophils % 11/29/2022 2  0.5 - 7.8 % Final    Basophils 11/29/2022 1  0.0 - 2.0 % Final    Immature Granulocytes 11/29/2022 0  0.0 - 5.0 % Final    Segs Absolute 11/29/2022 4.6  1.7 - 8.2 K/UL Final    Absolute Lymph # 11/29/2022 2.2  0.5 - 4.6 K/UL Final    Absolute Mono # 11/29/2022 0.4  0.1 - 1.3 K/UL Final    Absolute Eos # 11/29/2022 0.2  0.0 - 0.8 K/UL Final    Basophils Absolute 11/29/2022 0.0  0.0 - 0.2 K/UL Final    Absolute Immature Granulocyte 11/29/2022 0.0  0.0 - 0.5 K/UL Final    Differential Type 11/29/2022 AUTOMATED    Final         Treatment Summary has been discussed and given to patient: n/a        -------------------------------------------------------------------------------------------------------------------  Please call our office at [phone] if you have any  of the following symptoms:   Fever of 100.5 or greater  Chills  Shortness of breath  Swelling or pain in one leg    After office hours an answering service is available and will contact a provider for emergencies or if you are experiencing any of the above symptoms.     Patient does express an interest in My Chart. My Chart log in information explained on the after visit summary printout at the [facility] desk.     Oral Fess, PADMAJA

## 2022-11-29 NOTE — PROGRESS NOTES
[facility] Hematology and Oncology: Office Visit Established Patient    Chief Complaint:    Chief Complaint   Patient presents with    Follow-up         History of Present Illness:  Ms. Helene Hi is a 45 y.o. female who returns today for management of breast cancer. She initially presented for a routine bilateral screening mammogram on 1/8/22 which identified left breast calcifications. Further evaluation with left breast diagnostic mammogram on 1/13/22 confirmed the  presence of clustered microcalcifications at the 3 o'clock position of the left breast, biopsy was recommended. This was completed on 1/14/22 with pathology revealing invasive ductal carcinoma, low grade, ER 98%/NV 80%, HER2 negative. MRI of the bilateral  breasts was completed on 1/18/22 demonstrating the left 3:00 breast cancer measuring up to 2.1 cm with no additional suspicious findings. She was referred to Dr. Heber El on 1/27/22, she was referred to genetics but ultimately opted to forgo testing and decided on bilateral mastectomy. Path reviewed, this showed 18 mm of tumor in the left breast with 1 of 3 sentinel nodes positive for carcinoma. Right breast was benign. Despite the unexpected positive sentinel node, she still has [id number] disease  with only 1 node involved, so she would be appropriate for Oncotype Dx analysis for risk stratification. This has returned in the low risk range at 10, because she is over 50 she is squarely within a category of patients who did not benefit from adjuvant chemotherapy. Therefore, we will recommend antiestrogen therapy alone. She is surgically post-menopausal, so a prescription was given for anastrozole. Despite the bilateral mastectomy, I would like her to meet with radiation oncology to discuss adjuvant XRT given the positive node. Here for follow-up.   She has been on the Arimidex for a few weeks, she has had some nausea but her most notable side effect (potentially) has been some erythema of the superior left breast, she was treated for cellulitis with some improvement, but the erythema returned when she resumed the Arimidex and she is concerned that these are related. She returns to Dr. Deja Graves tomorrow for reassessment, he drained 90cc of seroma at her last visit. Review of Systems:  Constitutional: Negative. HENT: Negative. Eyes: Negative. Respiratory: Negative. Cardiovascular: Negative. Gastrointestinal: Negative. Genitourinary: Negative. Musculoskeletal: Negative. Skin: Positive for erythema (superior left breast). Neurological: Negative. Endo/Heme/Allergies: Negative. Psychiatric/Behavioral: Negative. All other systems reviewed and are negative. No Known Allergies  Past Medical History:   Diagnosis Date    Agatston coronary artery calcium score less than 100 03/2018    CAC 0    Cancer ([facility] [address]     Depression with anxiety     Essential hypertension     Family history of breast cancer     H/O right breast biopsy 6/2014, 7/2014    Menopause     Obesity, Class I, BMI 30-34.9     PUD (peptic ulcer disease)     Pulmonary nodules 03/2018    3 mm right upper lobe nodule and 2 mm left upper lobe nodule. Per pt no growth.      Sacral pain      Past Surgical History:   Procedure Laterality Date    BREAST BIOPSY Left 01/14/2022    stereo bx     BREAST BIOPSY  7/2/2014    RIGHT BREAST NEEDLE LOCALIZED BIOPSY X2    performed by Danyelle Archuleta MD at [facility]  06/2014    BREAST RECONSTRUCTION Bilateral 4/7/2022    BIALTERAL BREAST RECONSTRUCTION STAGE 1 WITH TISSUE EXPANDERS AND ADM performed by Hebert Lugo MD at [facility] N/A 4/11/2019    COLONOSCOPY/ 27 performed by Goldy Garibay DO at [id number]  07/2018    RANDEE STEROTACTIC LOC BREAST BIOPSY LEFT Left 1/14/2022    RANDEE STEROTACTIC LOC BREAST BIOPSY LEFT 1/14/2022 [facility]    MASTECTOMY Bilateral 4/7/2022    BILATERAL BREAST MASTECTOMY performed by Coco Nova MD at [address] (CERVIX REMOVED)  2021    full hysterectomy    TUBAL LIGATION      US GUIDED CORE BREAST BIOPSY Right 2014    7:00 pisition: Fibrocystic mastopathy having moderate intraductal hyperplasia, apocrine metaplasia and microcalcification. US GUIDED CORE BREAST BIOPSY Right 2014    6:00 position Fragments of fibroadenoma, simple     Family History   Problem Relation Age of Onset    Heart Attack Father     Breast Cancer Maternal Grandmother 80    Breast Cancer Mother 67    Heart Disease Father     Breast Cancer Maternal Aunt 58    Cancer Mother     Hypertension Brother      Social History     Socioeconomic History    Marital status:      Spouse name: Not on file    Number of children: Not on file    Years of education: Not on file    Highest education level: Not on file   Occupational History    Not on file   Tobacco Use    Smoking status: Former     Packs/day: 0.50     Years: 15.00     Pack years: 7.50     Types: Cigarettes     Quit date: 1999     Years since quittin.8    Smokeless tobacco: Never   Substance and Sexual Activity    Alcohol use: No    Drug use: No    Sexual activity: Not on file   Other Topics Concern    Not on file   Social History Narrative    16 yr old son  in a MVA 2017. She has a 32 yr old daughter. She attends  [employer].      Social Determinants of Health     Financial Resource Strain: Low Risk     Difficulty of Paying Living Expenses: Not hard at all   Food Insecurity: No Food Insecurity    Worried About Running Out of Food in the Last Year: Never true    Ran Out of Food in the Last Year: Never true   Transportation Needs: Not on file   Physical Activity: Not on file   Stress: Not on file   Social Connections: Not on file   Intimate Partner Violence: Not on file   Housing Stability: Not on file     Current Outpatient Medications   Medication Sig Dispense Refill    letrozole (FEMARA) 2.5 MG tablet Take 1 tablet by mouth daily 30 tablet 5    losartan-hydroCHLOROthiazide (HYZAAR) 50-12.5 MG per tablet Take 1 tablet by mouth Daily with supper 90 tablet 1    omeprazole (PRILOSEC) 20 MG delayed release capsule TAKE 1 CAPSULE BY MOUTH EVERY DAY (Patient taking differently: as needed TAKE 1 CAPSULE BY MOUTH EVERY DAY) 90 capsule 1    anastrozole (ARIMIDEX) 1 MG tablet Take 1 mg by mouth daily      desvenlafaxine succinate (PRISTIQ) 50 MG TB24 extended release tablet Take 1 tablet by mouth daily 90 tablet 0    vitamin D (ERGOCALCIFEROL) 1.25 MG (68508 UT) CAPS capsule Take 1 capsule by mouth once a week 12 capsule 0    LORazepam (ATIVAN) 1 MG tablet Take 1 mg by mouth daily as needed. Vitamin D, Cholecalciferol, 25 MCG (1000 UT) CAPS Take by mouth (Patient not taking: Reported on 11/4/2022)      cyclobenzaprine (FLEXERIL) 5 MG tablet Take 1 tablet by mouth nightly as needed for Muscle spasms (Patient not taking: Reported on 11/4/2022) 30 tablet 0    hydrocortisone 2.5 % cream Apply topically 3 times daily for itching rash. (Patient not taking: Reported on 11/29/2022) 28 g 3     No current facility-administered medications for this visit. OBJECTIVE:  /70 (Site: Right Upper Arm, Position: Sitting, Cuff Size: Medium Adult)   Pulse 90   Temp 98.8 °F (37.1 °C) (Oral)   Resp 16   Ht 5' 3\" (1.6 m)   Wt 180 lb 11.2 oz (82 kg)   SpO2 94%   BMI 32.01 kg/m²     Physical Exam:  Constitutional: Well developed, well nourished female in no acute distress, sitting comfortably in the exam room chair. HEENT: Normocephalic and atraumatic. Sclerae anicteric. Neck supple without JVD. No thyromegaly present. Lymph node   No palpable submandibular, cervical, supraclavicular lymph nodes. Skin Warm and dry. No bruising and no rash noted. No erythema. No pallor.     Respiratory Lungs are clear to auscultation bilaterally without wheezes, rales or rhonchi, normal air exchange without accessory muscle use. CVS Normal rate, regular rhythm and normal S1 and S2. No murmurs, gallops, or rubs. Abdomen Soft, nontender and nondistended, normoactive bowel sounds. No palpable mass. No hepatosplenomegaly. Neuro Grossly nonfocal with no obvious sensory or motor deficits. MSK Normal range of motion in general.  No edema and no tenderness. Psych Appropriate mood and affect.       Labs:  Recent Results (from the past 96 hour(s))   Comprehensive Metabolic Panel    Collection Time: 11/29/22  1:55 PM   Result Value Ref Range    Sodium 137 133 - 143 mmol/L    Potassium 4.0 3.5 - 5.1 mmol/L    Chloride 102 101 - 110 mmol/L    CO2 29 21 - 32 mmol/L    Anion Gap 6 2 - 11 mmol/L    Glucose 104 (H) 65 - 100 mg/dL    BUN 14 6 - 23 MG/DL    Creatinine 0.90 0.6 - 1.0 MG/DL    Est, Glom Filt Rate >60 >60 ml/min/1.73m2    Calcium 10.0 8.3 - 10.4 MG/DL    Total Bilirubin 0.3 0.2 - 1.1 MG/DL    ALT 43 12 - 65 U/L    AST 36 15 - 37 U/L    Alk Phosphatase 156 (H) 50 - 136 U/L    Total Protein 8.4 (H) 6.3 - 8.2 g/dL    Albumin 3.8 3.5 - 5.0 g/dL    Globulin 4.6 (H) 2.8 - 4.5 g/dL    Albumin/Globulin Ratio 0.8 0.4 - 1.6     CBC with Auto Differential    Collection Time: 11/29/22  1:55 PM   Result Value Ref Range    WBC 7.4 4.3 - 11.1 K/uL    RBC 5.01 4.05 - 5.2 M/uL    Hemoglobin 13.4 11.7 - 15.4 g/dL    Hematocrit 41.7 35.8 - 46.3 %    MCV 83.2 82.0 - 102.0 FL    MCH 26.7 26.1 - 32.9 PG    MCHC 32.1 31.4 - 35.0 g/dL    RDW 15.9 (H) 11.9 - 14.6 %    Platelets 302 (H) 609 - 450 K/uL    MPV 9.0 (L) 9.4 - 12.3 FL    nRBC 0.00 0.0 - 0.2 K/uL    Seg Neutrophils 62 43 - 78 %    Lymphocytes 29 13 - 44 %    Monocytes 6 4.0 - 12.0 %    Eosinophils % 2 0.5 - 7.8 %    Basophils 1 0.0 - 2.0 %    Immature Granulocytes 0 0.0 - 5.0 %    Segs Absolute 4.6 1.7 - 8.2 K/UL    Absolute Lymph # 2.2 0.5 - 4.6 K/UL    Absolute Mono # 0.4 0.1 - 1.3 K/UL    Absolute Eos # 0.2 0.0 - 0.8 K/UL    Basophils Absolute 0.0 0.0 - 0.2 K/UL    Absolute Immature Granulocyte 0.0 0.0 - 0.5 K/UL    Differential Type AUTOMATED         Imaging:  No results found. ASSESSMENT:   Diagnosis Orders   1. Malignant neoplasm of left breast in female, estrogen receptor positive, unspecified site of breast ([address]  letrozole (FEMARA) 2.5 MG tablet      2. 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 Cellulitis of breast                PLAN:  Lab studies were personally reviewed. Breast cancer: 2.1 cm on MRI, 1.8 cm on surgical pathology, left breast, IDC, low grade, ER/OH strongly positive, HER2 negative. S/p bilateral mastectomy with 18 mm of tumor in the left breast, 1 of 3 sentinel nodes positive for carcinoma. Despite the unexpected positive sentinel node, she still has [id number] disease  with only 1 node involved, so she would be appropriate for Oncotype Dx analysis for risk stratification. This has returned in the low risk range at 10, because she is over 50 she is squarely within a category of patients who did not benefit from adjuvant chemotherapy. Therefore, we will recommend antiestrogen therapy alone. She is surgically post-menopausal, so a prescription was given for anastrozole. Despite the bilateral mastectomy, I would like her to meet with radiation oncology to discuss adjuvant XRT given the positive node. She completed this and then we sent Arimidex to start endocrine therapy. Here for follow-up. She has been on the Arimidex for a few weeks, she has had some nausea but her most notable side effect (potentially) has been some erythema of the superior left breast, she was treated for cellulitis with some improvement, but the erythema returned when she resumed the Arimidex and she is concerned that these are related. She returns to Dr. Belem Quiñonez tomorrow for reassessment, he drained 90cc of seroma at her last visit. Labs reviewed and unremarkable. I do not see a potential mechanism for the erythema from the Arimidex, but nevertheless given the timing of the development and recurrence of the erythema, I am OK with her holding therapy and potentially trying Femara instead. Continue plastics follow-up. She understands and agrees to proceed. All questions were asked and answered to the best of my ability. F/u in 6-8 weeks to discuss tolerance to Femara.              Zainab Landrum MD, MD  [facility] Hematology and Oncology  [address] [place] [address]  Office : [phone]  Fax : [phone]

## 2022-12-21 DIAGNOSIS — C50.912 MALIGNANT NEOPLASM OF LEFT BREAST IN FEMALE, ESTROGEN RECEPTOR POSITIVE, UNSPECIFIED SITE OF BREAST (HCC): Primary | ICD-10-CM

## 2022-12-21 DIAGNOSIS — Z17.0 MALIGNANT NEOPLASM OF LEFT BREAST IN FEMALE, ESTROGEN RECEPTOR POSITIVE, UNSPECIFIED SITE OF BREAST (HCC): Primary | ICD-10-CM

## 2023-01-24 ENCOUNTER — HOSPITAL ENCOUNTER (OUTPATIENT)
Dept: LAB | Age: 53
Discharge: HOME | End: 2023-01-27
Payer: COMMERCIAL

## 2023-01-24 ENCOUNTER — OFFICE VISIT (OUTPATIENT)
Dept: ONCOLOGY | Age: 53
End: 2023-01-24
Payer: COMMERCIAL

## 2023-01-24 VITALS
HEIGHT: 63 IN | OXYGEN SATURATION: 99 % | RESPIRATION RATE: 16 BRPM | BODY MASS INDEX: 32.48 KG/M2 | DIASTOLIC BLOOD PRESSURE: 61 MMHG | WEIGHT: 183.3 LBS | SYSTOLIC BLOOD PRESSURE: 125 MMHG | HEART RATE: 65 BPM | TEMPERATURE: 97.8 F

## 2023-01-24 DIAGNOSIS — Z17.0 MALIGNANT NEOPLASM OF LEFT BREAST IN FEMALE, ESTROGEN RECEPTOR POSITIVE, UNSPECIFIED SITE OF BREAST (HCC): ICD-10-CM

## 2023-01-24 DIAGNOSIS — C50.912 MALIGNANT NEOPLASM OF LEFT BREAST IN FEMALE, ESTROGEN RECEPTOR POSITIVE, UNSPECIFIED SITE OF BREAST (HCC): ICD-10-CM

## 2023-01-24 DIAGNOSIS — C77.3 SECONDARY AND UNSPECIFIED MALIGNANT NEOPLASM OF AXILLA AND UPPER LIMB LYMPH NODES (HCC): ICD-10-CM

## 2023-01-24 LAB
ALBUMIN SERPL-MCNC: 3.9 G/DL (ref 3.5–5)
ALBUMIN/GLOB SERPL: 1 (ref 0.4–1.6)
ALP SERPL-CCNC: 137 U/L (ref 50–136)
ALT SERPL-CCNC: 40 U/L (ref 12–65)
ANION GAP SERPL CALC-SCNC: 8 MMOL/L (ref 2–11)
AST SERPL-CCNC: 23 U/L (ref 15–37)
BASOPHILS # BLD: 0 K/UL (ref 0–0.2)
BASOPHILS NFR BLD: 1 % (ref 0–2)
BILIRUB SERPL-MCNC: 0.4 MG/DL (ref 0.2–1.1)
BUN SERPL-MCNC: 11 MG/DL (ref 6–23)
CALCIUM SERPL-MCNC: 9.5 MG/DL (ref 8.3–10.4)
CHLORIDE SERPL-SCNC: 105 MMOL/L (ref 101–110)
CO2 SERPL-SCNC: 26 MMOL/L (ref 21–32)
CREAT SERPL-MCNC: 0.9 MG/DL (ref 0.6–1)
DIFFERENTIAL METHOD BLD: ABNORMAL
EOSINOPHIL # BLD: 0.1 K/UL (ref 0–0.8)
EOSINOPHIL NFR BLD: 2 % (ref 0.5–7.8)
ERYTHROCYTE [DISTWIDTH] IN BLOOD BY AUTOMATED COUNT: 16.8 % (ref 11.9–14.6)
GLOBULIN SER CALC-MCNC: 4.1 G/DL (ref 2.8–4.5)
GLUCOSE SERPL-MCNC: 72 MG/DL (ref 65–100)
HCT VFR BLD AUTO: 40.4 % (ref 35.8–46.3)
HGB BLD-MCNC: 12.9 G/DL (ref 11.7–15.4)
IMM GRANULOCYTES # BLD AUTO: 0 K/UL (ref 0–0.5)
IMM GRANULOCYTES NFR BLD AUTO: 0 % (ref 0–5)
LYMPHOCYTES # BLD: 2.2 K/UL (ref 0.5–4.6)
LYMPHOCYTES NFR BLD: 39 % (ref 13–44)
MCH RBC QN AUTO: 26.8 PG (ref 26.1–32.9)
MCHC RBC AUTO-ENTMCNC: 31.9 G/DL (ref 31.4–35)
MCV RBC AUTO: 83.8 FL (ref 82–102)
MONOCYTES # BLD: 0.4 K/UL (ref 0.1–1.3)
MONOCYTES NFR BLD: 6 % (ref 4–12)
NEUTS SEG # BLD: 3 K/UL (ref 1.7–8.2)
NEUTS SEG NFR BLD: 52 % (ref 43–78)
NRBC # BLD: 0 K/UL (ref 0–0.2)
PLATELET # BLD AUTO: 357 K/UL (ref 150–450)
PMV BLD AUTO: 9.6 FL (ref 9.4–12.3)
POTASSIUM SERPL-SCNC: 3.4 MMOL/L (ref 3.5–5.1)
PROT SERPL-MCNC: 8 G/DL (ref 6.3–8.2)
RBC # BLD AUTO: 4.82 M/UL (ref 4.05–5.2)
SODIUM SERPL-SCNC: 139 MMOL/L (ref 133–143)
WBC # BLD AUTO: 5.7 K/UL (ref 4.3–11.1)

## 2023-01-24 PROCEDURE — 3074F SYST BP LT 130 MM HG: CPT | Performed by: INTERNAL MEDICINE

## 2023-01-24 PROCEDURE — 36415 COLL VENOUS BLD VENIPUNCTURE: CPT

## 2023-01-24 PROCEDURE — 85025 COMPLETE CBC W/AUTO DIFF WBC: CPT

## 2023-01-24 PROCEDURE — 80053 COMPREHEN METABOLIC PANEL: CPT

## 2023-01-24 PROCEDURE — 99214 OFFICE O/P EST MOD 30 MIN: CPT | Performed by: INTERNAL MEDICINE

## 2023-01-24 PROCEDURE — 3078F DIAST BP <80 MM HG: CPT | Performed by: INTERNAL MEDICINE

## 2023-01-24 RX ORDER — LETROZOLE 2.5 MG/1
2.5 TABLET, FILM COATED ORAL DAILY
Qty: 30 TABLET | Refills: 5 | Status: SHIPPED | OUTPATIENT
Start: 2023-01-24

## 2023-01-24 ASSESSMENT — PATIENT HEALTH QUESTIONNAIRE - PHQ9
2. FEELING DOWN, DEPRESSED OR HOPELESS: 0
SUM OF ALL RESPONSES TO PHQ QUESTIONS 1-9: 0
1. LITTLE INTEREST OR PLEASURE IN DOING THINGS: 0
SUM OF ALL RESPONSES TO PHQ QUESTIONS 1-9: 0
SUM OF ALL RESPONSES TO PHQ QUESTIONS 1-9: 0
SUM OF ALL RESPONSES TO PHQ9 QUESTIONS 1 & 2: 0
SUM OF ALL RESPONSES TO PHQ QUESTIONS 1-9: 0

## 2023-01-24 NOTE — PATIENT INSTRUCTIONS
Patient Instructions from Today's Visit    Reason for Visit:  Follow up. Diagnosis Information:  https://www.[employer].com/. net/about-us/asco-answers-patient-education-materials/cidj-joufqjz-fxpk-sheets      Plan:  Continue letrozole. You can try topical moisturizers for the itching on your arms (like Vanicream or Eucerin)  For the other itching, try Replens - if this does not help, you can try an insert called Revaree (order online at [employer])      Follow Up:  6 months with labs.       Recent Lab Results:  Hospital Outpatient Visit on 01/24/2023   Component Date Value Ref Range Status    WBC 01/24/2023 5.7  4.3 - 11.1 K/uL Final    RBC 01/24/2023 4.82  4.05 - 5.2 M/uL Final    Hemoglobin 01/24/2023 12.9  11.7 - 15.4 g/dL Final    Hematocrit 01/24/2023 40.4  35.8 - 46.3 % Final    MCV 01/24/2023 83.8  82.0 - 102.0 FL Final    MCH 01/24/2023 26.8  26.1 - 32.9 PG Final    MCHC 01/24/2023 31.9  31.4 - 35.0 g/dL Final    RDW 01/24/2023 16.8 (A)  11.9 - 14.6 % Final    Platelets 90/21/0649 357  150 - 450 K/uL Final    MPV 01/24/2023 9.6  9.4 - 12.3 FL Final    nRBC 01/24/2023 0.00  0.0 - 0.2 K/uL Final    **Note: Absolute NRBC parameter is now reported with Hemogram**    Differential Type 01/24/2023 AUTOMATED    Final    Seg Neutrophils 01/24/2023 52  43 - 78 % Final    Lymphocytes 01/24/2023 39  13 - 44 % Final    Monocytes 01/24/2023 6  4.0 - 12.0 % Final    Eosinophils % 01/24/2023 2  0.5 - 7.8 % Final    Basophils 01/24/2023 1  0.0 - 2.0 % Final    Immature Granulocytes 01/24/2023 0  0.0 - 5.0 % Final    Segs Absolute 01/24/2023 3.0  1.7 - 8.2 K/UL Final    Absolute Lymph # 01/24/2023 2.2  0.5 - 4.6 K/UL Final    Absolute Mono # 01/24/2023 0.4  0.1 - 1.3 K/UL Final    Absolute Eos # 01/24/2023 0.1  0.0 - 0.8 K/UL Final    Basophils Absolute 01/24/2023 0.0  0.0 - 0.2 K/UL Final    Absolute Immature Granulocyte 01/24/2023 0.0  0.0 - 0.5 K/UL Final    Sodium 01/24/2023 139  133 - 143 mmol/L Final    Potassium 01/24/2023 3.4 (A)  3.5 - 5.1 mmol/L Final    Chloride 01/24/2023 105  101 - 110 mmol/L Final    CO2 01/24/2023 26  21 - 32 mmol/L Final    Anion Gap 01/24/2023 8  2 - 11 mmol/L Final    Glucose 01/24/2023 72  65 - 100 mg/dL Final    BUN 01/24/2023 11  6 - 23 MG/DL Final    Creatinine 01/24/2023 0.90  0.6 - 1.0 MG/DL Final    Est, Glom Filt Rate 01/24/2023 >60  >60 ml/min/1.73m2 Final    Comment:      Pediatric calculator link: Sahil.at. org/professionals/kdoqi/gfr_calculatorped       These results are not intended for use in patients <25years of age. eGFR results are calculated without a race factor using  the 2021 CKD-EPI equation. Careful clinical correlation is recommended, particularly when comparing to results calculated using previous equations. The CKD-EPI equation is less accurate in patients with extremes of muscle mass, extra-renal metabolism of creatinine, excessive creatine ingestion, or following therapy that affects renal tubular secretion.       Calcium 01/24/2023 9.5  8.3 - 10.4 MG/DL Final    Total Bilirubin 01/24/2023 0.4  0.2 - 1.1 MG/DL Final    ALT 01/24/2023 40  12 - 65 U/L Final    AST 01/24/2023 23  15 - 37 U/L Final    Alk Phosphatase 01/24/2023 137 (A)  50 - 136 U/L Final    Total Protein 01/24/2023 8.0  6.3 - 8.2 g/dL Final    Albumin 01/24/2023 3.9  3.5 - 5.0 g/dL Final    Globulin 01/24/2023 4.1  2.8 - 4.5 g/dL Final    Albumin/Globulin Ratio 01/24/2023 1.0  0.4 - 1.6   Final         Treatment Summary has been discussed and given to patient: n/a        -------------------------------------------------------------------------------------------------------------------  Please call our office at [phone] if you have any  of the following symptoms:   Fever of 100.5 or greater  Chills  Shortness of breath  Swelling or pain in one leg    After office hours an answering service is available and will contact a provider for emergencies or if you are experiencing any of the above symptoms. Patient did express an interest in My Chart. My Chart log in information explained on the after visit summary printout at the [facility] desk.     Melodie Izaguirre RN
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
## 2023-02-09 ENCOUNTER — OFFICE VISIT (OUTPATIENT)
Dept: INTERNAL MEDICINE CLINIC | Facility: CLINIC | Age: 53
End: 2023-02-09
Payer: COMMERCIAL

## 2023-02-09 VITALS
TEMPERATURE: 98.1 F | BODY MASS INDEX: 32.6 KG/M2 | DIASTOLIC BLOOD PRESSURE: 82 MMHG | HEART RATE: 75 BPM | OXYGEN SATURATION: 97 % | WEIGHT: 184 LBS | SYSTOLIC BLOOD PRESSURE: 136 MMHG | HEIGHT: 63 IN

## 2023-02-09 DIAGNOSIS — K82.8 BILIARY DYSKINESIA: ICD-10-CM

## 2023-02-09 DIAGNOSIS — F32.0 MAJOR DEPRESSIVE DISORDER, SINGLE EPISODE, MILD (HCC): ICD-10-CM

## 2023-02-09 DIAGNOSIS — K21.9 GASTROESOPHAGEAL REFLUX DISEASE, UNSPECIFIED WHETHER ESOPHAGITIS PRESENT: ICD-10-CM

## 2023-02-09 DIAGNOSIS — I10 ESSENTIAL HYPERTENSION: Primary | ICD-10-CM

## 2023-02-09 PROBLEM — F11.99 OPIOID USE, UNSPECIFIED WITH UNSPECIFIED OPIOID-INDUCED DISORDER (HCC): Status: RESOLVED | Noted: 2022-01-19 | Resolved: 2023-02-09

## 2023-02-09 PROCEDURE — 3075F SYST BP GE 130 - 139MM HG: CPT | Performed by: INTERNAL MEDICINE

## 2023-02-09 PROCEDURE — 3079F DIAST BP 80-89 MM HG: CPT | Performed by: INTERNAL MEDICINE

## 2023-02-09 PROCEDURE — 99214 OFFICE O/P EST MOD 30 MIN: CPT | Performed by: INTERNAL MEDICINE

## 2023-02-09 SDOH — ECONOMIC STABILITY: TRANSPORTATION INSECURITY
IN THE PAST 12 MONTHS, HAS LACK OF TRANSPORTATION KEPT YOU FROM MEETINGS, WORK, OR FROM GETTING THINGS NEEDED FOR DAILY LIVING?: NO

## 2023-02-09 SDOH — ECONOMIC STABILITY: HOUSING INSECURITY
IN THE LAST 12 MONTHS, WAS THERE A TIME WHEN YOU DID NOT HAVE A STEADY PLACE TO SLEEP OR SLEPT IN A SHELTER (INCLUDING NOW)?: NO

## 2023-02-09 SDOH — ECONOMIC STABILITY: FOOD INSECURITY: WITHIN THE PAST 12 MONTHS, THE FOOD YOU BOUGHT JUST DIDN'T LAST AND YOU DIDN'T HAVE MONEY TO GET MORE.: NEVER TRUE

## 2023-02-09 SDOH — ECONOMIC STABILITY: INCOME INSECURITY: HOW HARD IS IT FOR YOU TO PAY FOR THE VERY BASICS LIKE FOOD, HOUSING, MEDICAL CARE, AND HEATING?: NOT HARD AT ALL

## 2023-02-09 SDOH — ECONOMIC STABILITY: FOOD INSECURITY: WITHIN THE PAST 12 MONTHS, YOU WORRIED THAT YOUR FOOD WOULD RUN OUT BEFORE YOU GOT MONEY TO BUY MORE.: NEVER TRUE

## 2023-02-09 ASSESSMENT — PATIENT HEALTH QUESTIONNAIRE - PHQ9
SUM OF ALL RESPONSES TO PHQ9 QUESTIONS 1 & 2: 0
6. FEELING BAD ABOUT YOURSELF - OR THAT YOU ARE A FAILURE OR HAVE LET YOURSELF OR YOUR FAMILY DOWN: 0
1. LITTLE INTEREST OR PLEASURE IN DOING THINGS: 0
3. TROUBLE FALLING OR STAYING ASLEEP: 0
2. FEELING DOWN, DEPRESSED OR HOPELESS: 0
10. IF YOU CHECKED OFF ANY PROBLEMS, HOW DIFFICULT HAVE THESE PROBLEMS MADE IT FOR YOU TO DO YOUR WORK, TAKE CARE OF THINGS AT HOME, OR GET ALONG WITH OTHER PEOPLE: 0
7. TROUBLE CONCENTRATING ON THINGS, SUCH AS READING THE NEWSPAPER OR WATCHING TELEVISION: 0
9. THOUGHTS THAT YOU WOULD BE BETTER OFF DEAD, OR OF HURTING YOURSELF: 0
SUM OF ALL RESPONSES TO PHQ QUESTIONS 1-9: 0
8. MOVING OR SPEAKING SO SLOWLY THAT OTHER PEOPLE COULD HAVE NOTICED. OR THE OPPOSITE, BEING SO FIGETY OR RESTLESS THAT YOU HAVE BEEN MOVING AROUND A LOT MORE THAN USUAL: 0
5. POOR APPETITE OR OVEREATING: 0
SUM OF ALL RESPONSES TO PHQ QUESTIONS 1-9: 0
4. FEELING TIRED OR HAVING LITTLE ENERGY: 0

## 2023-02-09 ASSESSMENT — ANXIETY QUESTIONNAIRES
3. WORRYING TOO MUCH ABOUT DIFFERENT THINGS: 0
4. TROUBLE RELAXING: 0
7. FEELING AFRAID AS IF SOMETHING AWFUL MIGHT HAPPEN: 0
GAD7 TOTAL SCORE: 1
1. FEELING NERVOUS, ANXIOUS, OR ON EDGE: 1
IF YOU CHECKED OFF ANY PROBLEMS ON THIS QUESTIONNAIRE, HOW DIFFICULT HAVE THESE PROBLEMS MADE IT FOR YOU TO DO YOUR WORK, TAKE CARE OF THINGS AT HOME, OR GET ALONG WITH OTHER PEOPLE: NOT DIFFICULT AT ALL
2. NOT BEING ABLE TO STOP OR CONTROL WORRYING: 0
5. BEING SO RESTLESS THAT IT IS HARD TO SIT STILL: 0
6. BECOMING EASILY ANNOYED OR IRRITABLE: 0

## 2023-02-09 NOTE — PROGRESS NOTES
Doris Pettit M.D. Internal Medicine  [address] [facility] [place] , [address]   Office : [phone]  Fax : [phone]    Chief Complaint   Patient presents with    Hypertension     3 mo follow up       History of Present Illness: Maxine Pereyra is a 45 y.o. female. HPI    Hypertension  Patient is in for Hypertension which is stable. Diet and Lifestyle: generally follows a low sodium diet, exercises sporadically  Home BP Monitoring: is not measured at home. Patient's recent blood pressures were previously   BP Readings from Last 3 Encounters:   02/09/23 136/82   01/24/23 125/61   11/29/22 113/70   . taking medications as instructed, no medication side effects noted, no TIA's, no chest pain on exertion, no dyspnea on exertion, no swelling of ankles. Cardiac risk factors consist of hypertension, stress. Lab Results   Component Value Date/Time     01/24/2023 03:13 PM    K 3.4 01/24/2023 03:13 PM     01/24/2023 03:13 PM    CO2 26 01/24/2023 03:13 PM    BUN 11 01/24/2023 03:13 PM    GFRAA >60 08/31/2022 02:32 PM        GERD  Controlled with intermittent use of omprazole    Depression with anxiety  Pristiq decreased and rheumatologist increased it back to 50 mg. She liked cymbalta better but is not the best time to make the transisition    Biliary Dyskinesia  Abdominal surgery post-poned due to recent diagnosis of breast cancer. Still having intermittent pain    Breast Cancer  Had bilateral mastectomies with lymph node dissection and finished RT.   Changed to femara but still having side effects    Past Medical History:  Past Medical History:   Diagnosis Date    Agatston coronary artery calcium score less than 100 03/2018    CAC 0    Cancer ([address]     Depression with anxiety     Essential hypertension     Family history of breast cancer     H/O right breast biopsy 6/2014, 7/2014    Menopause     Obesity, Class I, BMI 30-34.9     PUD (peptic ulcer disease)     Pulmonary nodules 03/2018    3 mm right upper lobe nodule and 2 mm left upper lobe nodule. Per pt no growth. Sacral pain      Past Surgical History:  Past Surgical History:   Procedure Laterality Date    BREAST BIOPSY Left 01/14/2022    stereo bx     BREAST BIOPSY  7/2/2014    RIGHT BREAST NEEDLE LOCALIZED BIOPSY X2    performed by Flora Pappas MD at [address]  06/2014    BREAST RECONSTRUCTION Bilateral 4/7/2022    BIALTERAL BREAST RECONSTRUCTION STAGE 1 WITH TISSUE EXPANDERS AND ADM performed by Charito Castaneda MD at [address] N/A 4/11/2019    COLONOSCOPY/ 27 performed by Stacia Arroyo DO at [address]  07/2018    [facility] LOC BREAST BIOPSY LEFT Left 1/14/2022    [facility] LOC BREAST BIOPSY LEFT 1/14/2022 [facility]    MASTECTOMY Bilateral 4/7/2022    BILATERAL BREAST MASTECTOMY performed by Patito Virgen MD at [facility] (CERVIX REMOVED)  08/18/2021    full hysterectomy    TUBAL LIGATION  1999    US GUIDED CORE BREAST BIOPSY Right 6/5/2014    7:00 pisition: Fibrocystic mastopathy having moderate intraductal hyperplasia, apocrine metaplasia and microcalcification.     US GUIDED CORE BREAST BIOPSY Right 6/5/2014    6:00 position Fragments of fibroadenoma, simple     Allergies:   No Known Allergies  Medications:   Current Outpatient Medications   Medication Sig Dispense Refill    letrozole (FEMARA) 2.5 MG tablet Take 1 tablet by mouth daily 30 tablet 5    losartan-hydroCHLOROthiazide (HYZAAR) 50-12.5 MG per tablet Take 1 tablet by mouth Daily with supper 90 tablet 1    omeprazole (PRILOSEC) 20 MG delayed release capsule TAKE 1 CAPSULE BY MOUTH EVERY DAY (Patient taking differently: as needed TAKE 1 CAPSULE BY MOUTH EVERY DAY) 90 capsule 1    desvenlafaxine succinate (PRISTIQ) 50 MG TB24 extended release tablet Take 1 tablet by mouth daily 90 tablet 0    LORazepam (ATIVAN) 1 MG tablet Take 1 mg by mouth daily as needed. hydrocortisone 2.5 % cream Apply topically 3 times daily for itching rash. 28 g 3    Vitamin D, Cholecalciferol, 25 MCG (1000 UT) CAPS Take by mouth (Patient not taking: Reported on 2023)      vitamin D (ERGOCALCIFEROL) 1.25 MG (11224 UT) CAPS capsule Take 1 capsule by mouth once a week (Patient not taking: Reported on 2023) 12 capsule 0     No current facility-administered medications for this visit. Social History:  Social History     Tobacco Use    Smoking status: Former     Packs/day: 0.50     Years: 15.00     Pack years: 7.50     Types: Cigarettes     Quit date: 1999     Years since quittin.9    Smokeless tobacco: Never   Substance Use Topics    Alcohol use: No     Family History  Family History   Problem Relation Age of Onset    Heart Attack Father     Breast Cancer Maternal Grandmother 80    Breast Cancer Mother 67    Heart Disease Father     Breast Cancer Maternal Aunt 58    Cancer Mother     Hypertension Brother        Review of Systems   Constitutional:  Positive for fatigue. Negative for chills and fever. Musculoskeletal:  Negative for gait problem. Skin:  Negative for rash. Neurological:  Negative for speech difficulty. Psychiatric/Behavioral:  Negative for dysphoric mood. Vital Signs  /82 (Site: Right Upper Arm, Position: Sitting, Cuff Size: Large Adult)   Pulse 75   Temp 98.1 °F (36.7 °C) (Temporal)   Ht 5' 3\" (1.6 m)   Wt 184 lb (83.5 kg)   SpO2 97%   BMI 32.59 kg/m²   Body mass index is 32.59 kg/m². Physical Exam  Vitals reviewed. Constitutional:       General: She is not in acute distress. Appearance: Normal appearance. She is not ill-appearing or toxic-appearing. HENT:      Head: Normocephalic and atraumatic. Eyes:      General: No scleral icterus. Conjunctiva/sclera: Conjunctivae normal.   Neck:      Vascular: No carotid bruit.    Cardiovascular: Rate and Rhythm: Normal rate and regular rhythm. Heart sounds: Normal heart sounds. No murmur heard. Pulmonary:      Effort: Pulmonary effort is normal.      Breath sounds: Normal breath sounds. Musculoskeletal:         General: No swelling. Skin:     Coloration: Skin is not jaundiced. Findings: No rash. Neurological:      General: No focal deficit present. Mental Status: She is alert. Mental status is at baseline. Cranial Nerves: No cranial nerve deficit. Motor: No weakness. Gait: Gait normal.   Psychiatric:         Mood and Affect: Mood normal.         Behavior: Behavior normal.         Thought Content: Thought content normal.         Judgment: Judgment normal.         Assessment/Plan:  Quentin Vargas was seen today for hypertension. Diagnoses and all orders for this visit:    Essential hypertension    Major depressive disorder, single episode, mild (HCC)    Gastroesophageal reflux disease, unspecified whether esophagitis present    Biliary dyskinesia    BP controlled so continue Hyzaar    Depression acceptably controlled so continue Pristiq    Gerd controlled so continue PPI    Return in about 4 months (around 6/9/2023), or if symptoms worsen or fail to improve.   __  Jan Rajan M.D.

## 2023-02-27 DIAGNOSIS — R53.83 OTHER FATIGUE: ICD-10-CM

## 2023-02-27 DIAGNOSIS — M15.9 GENERALIZED OSTEOARTHRITIS: ICD-10-CM

## 2023-02-27 DIAGNOSIS — E55.9 VITAMIN D DEFICIENCY, UNSPECIFIED: ICD-10-CM

## 2023-02-27 DIAGNOSIS — M79.10 MYALGIA, UNSPECIFIED SITE: ICD-10-CM

## 2023-02-27 DIAGNOSIS — M53.3 SACROCOCCYGEAL DISORDERS, NOT ELSEWHERE CLASSIFIED: ICD-10-CM

## 2023-02-27 DIAGNOSIS — M54.16 RADICULOPATHY, LUMBAR REGION: ICD-10-CM

## 2023-02-27 DIAGNOSIS — M79.7 FIBROMYALGIA: ICD-10-CM

## 2023-02-27 DIAGNOSIS — G62.9 NEUROPATHY: ICD-10-CM

## 2023-02-27 DIAGNOSIS — F41.8 OTHER SPECIFIED ANXIETY DISORDERS: ICD-10-CM

## 2023-03-01 ENCOUNTER — TELEPHONE (OUTPATIENT)
Dept: ONCOLOGY | Age: 53
End: 2023-03-01

## 2023-03-01 RX ORDER — ANASTROZOLE 1 MG/1
1 TABLET ORAL DAILY
Qty: 30 TABLET | Refills: 3 | Status: SHIPPED | OUTPATIENT
Start: 2023-03-01

## 2023-03-01 NOTE — TELEPHONE ENCOUNTER
Call to the patient and she has bone pain she is aching all over. When she started the femara it made her so anxious that she went back to the Arimidex. She had a ring finger cramp that went up into her wrist.  Msg to RUTH Cantu. Per NP Tameka :Labs reviewed and no issues with taking Tylenol.   Can take Claritin for itching daily  Call to the patient and she Verbalizes understanding of instructions

## 2023-03-01 NOTE — TELEPHONE ENCOUNTER
Pt stated she is having a reaction to her medication Arimidex. PT is having a lot of soreness in her bones, and is itching a lot. Pt also stated she would like to get approval from dr. Franco Reusing to take Tylenol for the pain if possible.

## 2023-03-14 ENCOUNTER — TELEMEDICINE (OUTPATIENT)
Dept: RHEUMATOLOGY | Age: 53
End: 2023-03-14
Payer: COMMERCIAL

## 2023-03-14 DIAGNOSIS — M79.10 MYALGIA, UNSPECIFIED SITE: ICD-10-CM

## 2023-03-14 DIAGNOSIS — C50.919 MALIGNANT NEOPLASM OF BREAST IN FEMALE, ESTROGEN RECEPTOR POSITIVE, UNSPECIFIED LATERALITY, UNSPECIFIED SITE OF BREAST (HCC): Primary | ICD-10-CM

## 2023-03-14 DIAGNOSIS — M79.7 FIBROMYALGIA: ICD-10-CM

## 2023-03-14 DIAGNOSIS — Z17.0 MALIGNANT NEOPLASM OF BREAST IN FEMALE, ESTROGEN RECEPTOR POSITIVE, UNSPECIFIED LATERALITY, UNSPECIFIED SITE OF BREAST (HCC): Primary | ICD-10-CM

## 2023-03-14 DIAGNOSIS — M15.9 GENERALIZED OSTEOARTHRITIS: ICD-10-CM

## 2023-03-14 DIAGNOSIS — M54.16 RADICULOPATHY, LUMBAR REGION: ICD-10-CM

## 2023-03-14 DIAGNOSIS — E55.9 VITAMIN D DEFICIENCY, UNSPECIFIED: ICD-10-CM

## 2023-03-14 DIAGNOSIS — R53.83 OTHER FATIGUE: ICD-10-CM

## 2023-03-14 DIAGNOSIS — G62.9 NEUROPATHY: ICD-10-CM

## 2023-03-14 DIAGNOSIS — F41.8 OTHER SPECIFIED ANXIETY DISORDERS: ICD-10-CM

## 2023-03-14 PROCEDURE — 99215 OFFICE O/P EST HI 40 MIN: CPT | Performed by: INTERNAL MEDICINE

## 2023-03-14 RX ORDER — ERGOCALCIFEROL 1.25 MG/1
50000 CAPSULE ORAL WEEKLY
Qty: 12 CAPSULE | Refills: 0 | Status: SHIPPED | OUTPATIENT
Start: 2023-03-14

## 2023-03-14 RX ORDER — DESVENLAFAXINE 50 MG/1
50 TABLET, EXTENDED RELEASE ORAL DAILY
Qty: 90 TABLET | Refills: 0 | Status: SHIPPED | OUTPATIENT
Start: 2023-03-14

## 2023-03-14 RX ORDER — GABAPENTIN 300 MG/1
CAPSULE ORAL
Qty: 90 CAPSULE | Refills: 0 | Status: SHIPPED | OUTPATIENT
Start: 2023-03-14 | End: 2024-03-07

## 2023-03-14 RX ORDER — CYCLOBENZAPRINE HCL 5 MG
5 TABLET ORAL PRN
COMMUNITY

## 2023-03-14 NOTE — LETTER
3/14/2023    Ms. Mary Bell  [address] [id number]      To Whom It May Concern:     we are initiating titration of patient's medication regiment therefore requires adjustment of work hours to 830 to 3 PM effective immediately until March 27, 2023      If there are questions or concerns, please have the patient contact our office.         Sincerely,      Gato Silva MD

## 2023-03-14 NOTE — PATIENT INSTRUCTIONS
1.  Patient needs letter for work stating- we are initiating titration of patient's medication regiment therefore requires adjustment of work hours to 830 to 3 PM effective immediately until March 27, 2023  2. Resume Vit d 95286 units once weekly for now   3. Resume pristiq 50mg daily   4.  tylenol 1000mg every am and pm - scheduled  5. Start gabapentin 300 mg daily at 6 PM only  6.  RTC in 1 months - call if any issues with vit d

## 2023-03-14 NOTE — PROGRESS NOTES
Karlene Lorenzo is a 45 y.o. female who was seen by synchronous (real-time) audio-video technology. Consent:  She and/or her healthcare decision maker is aware that this patient-initiated Telehealth encounter is a billable service, with coverage as determined by her insurance carrier. She is aware that she may receive a bill and has provided verbal consent to proceed: Yes    I was at home while conducting this encounter. Subjective:      HPI:          Permanent history      Mrs. Veria Duane is a 49-year-old  lady with past medical history significant for hypertension, GERD, obesity, depression, sacral pain and chronic myalgias and arthralgias who presents for evaluation. Patient reports longstanding muscular pain. Has constant body ache, radaites down shoulders, across shoulderblades, down into biceps, radiates down hips. Tried cymbatla, tail bone pain. Tried lyrica- still no relief. Always in pain. Able to do normal activites, but doing an all day things, she is in misery. Worst pain in back, shulders and hips. Tailbone and hip pain started in 2017. cymbalta or lisa did not help with the pain. Worst pain on rt side. Will have husbnad push in shoulder, feels like their are knots in her shoulder, but this causes pain. Now she takes pristiq and lyrica. But cannot take lyrica, makes her very sleepy, unale to work. Never tried gabapentin. Takes lyrica in pm. Takes pristiq in am. Switched from cymbalta to pristiq bc she was feeling depression. Taken pristiq for about a few months- has missed some dose, worse when she doesn't take it. When missed lrica, doesn't notice a difference in pain. Tried to get off pristiq bc she doesn't want to take meds, no side effects. Tried ibuprofen, didn't help- doesn't take it- has prescription for it. Hasn't tried tylenol. Pain in shoulders, back, down to tailbone.  Feet will hurt first thing in the morning, but walking around it goes away- takes about 10 mins. No significant joint swelling. Symptoms worse with use better with rest.  Massage helps. Used to work out, had to stop bc of severe cramps from working out. A lot of movement makes it worse- joyce moving heavy furniture. Even doing normal household chores cause pain. This causes a spiral where she then gets upset, and sad cause she hates not being able to do these normal things again. No injury that she knows that started any of this. pcp did mri of tailbone area, in 2017- hasnt had anything since. No family hisotry of rheum. 2 siblings- brother, sister- no rheum. 1 kid - killed in car accident. No miscarrigaes. Other kid is healthy. Since last visit       Taking Vit D 1000qd, ran out months ago of the Vit d 50,000  and Tram prn. Pt has stopped Pristiq as of 3 weeks ago, thought it wasn't working but may need it back. Pt had b/l breast reconstruction Nov or Dec 2022. Pt had radiation July thru Aug, currently on Armidex. Labs in [place] but not current, cbc and cmp in , last Vit D 2022, pt wanted to discuss Pristiq/depression Worst pain in neck and shoulders. ROS:     Musculoskeletal ROS:   .    Abnormal:  joint pain, back pain, neck pain  . AM stiffness (hours): 10-15 min  . Pain scale (0-10): 8  . Fatigue scale (0-10): 10  .    Job status: working   . Activities of daily living: no difficulty    positive as above with the addition of   Headaches   Otherwise negative for:  Fevers, chills, sweats. Weight  stable  No scalp tenderness. No jaw claudication. No acute visual changes. No red or dry eyes. No auditory complaints. No nasal discharge or rhinorrhea or dry mouth. No oral lesions or ulcers. No sore throat. No tongue pain. No cough. No shortness of breath, dyspnea on exertion or wheezing. No hemoptysis. No chest pains or palpitations. No lightheadedness. No pedal edema. No GERD symptoms, abdominal pain,diarrhea or constipation.     No increased urinary frequency, nocturia, dysuria or changes in urine color. No alopecia, skin rashes/lesions. No changes in skin tightness. No Raynaud's. Photosensitivity. Current Outpatient Medications:     LORazepam (ATIVAN) 1 mg tablet, Take 1 mg by mouth daily as needed. , Disp: , Rfl:     losartan (COZAAR) 50 mg tablet, TAKE 1 TABLET BY MOUTH DAILY, Disp: 90 Tablet, Rfl: 1    omeprazole (PRILOSEC) 20 mg capsule, TABLET 1 CAPSULE BY MOUTH EVERY DAY, Disp: 90 Capsule, Rfl: 1    desvenlafaxine succinate (PRISTIQ) 50 mg ER tablet, Take 1 Tablet by mouth daily. , Disp: 90 Tablet, Rfl: 1    hydroCHLOROthiazide (HYDRODIURIL) 12.5 mg tablet, TAKE 1 TABLET BY MOUTH DAILY, Disp: 90 Tablet, Rfl: 1    carisoprodoL (SOMA) 350 mg tablet, Take 1 Tablet by mouth every eight (8) hours as needed for Muscle Spasm(s). Max Daily Amount: 1,050 mg., Disp: 15 Tablet, Rfl: 0    estradioL (VIVELLE) 0.1 mg/24 hr, 1 Patch by TransDERmal route two (2) times a week., Disp: , Rfl:     ergocalciferol (ERGOCALCIFEROL) 1,250 mcg (50,000 unit) capsule, Take 1 Capsule by mouth two (2) days a week., Disp: 24 Capsule, Rfl: 0  No current facility-administered medications for this visit.     Allergies   Allergen Reactions    Citalopram Nausea and Vomiting    Bupropion Hcl Other (comments)    Fluoxetine Other (comments)     \"CRAZY THOUGHTS\"    Paroxetine Hcl Other (comments)     \"MADE SLEEPY\"       Patient Active Problem List   Diagnosis Code    Atypical ductal hyperplasia of breast N60.99    BMI 29.0-29.9,adult Z68.29    Sacral pain M53.3    Essential hypertension I10    Agatston coronary artery calcium score less than 100 R93.1    Pulmonary nodules R91.8    Obesity, Class I, BMI 30-34.9 E66.9    Gastroesophageal reflux disease without esophagitis K21.9    Depression with anxiety F41.8       Past Medical History:   Diagnosis Date    Agatston coronary artery calcium score less than 100 03/2018    CAC 0    Chronic Sacral pain     Depression with anxiety Essential hypertension     Family history of breast cancer     Menopause     Obesity, Class I, BMI 30-34.9     Pulmonary nodules 2018    3 mm right upper lobe nodule and 2 mm left upper lobe nodule. Per pt no growth. Right breast biopsy 2014, 2014       Past Surgical History:   Procedure Laterality Date    COLONOSCOPY N/A 2019    COLONOSCOPY/ 27 performed by Addis Velazquez DO at [facility]  2014    HX BREAST BIOPSY  2014    RIGHT BREAST NEEDLE LOCALIZED BIOPSY X2    performed by [facility]rivedi [facility] at [facility]    HX BREAST BIOPSY Left 2022    stereo bx     HX  SECTION      HX HYSTERECTOMY  2021    HX HYSTEROSCOPY WITH ENDOMETRIAL ABLATION  2018    HX JOSÉ MIGUEL AND BSO  2021    full hysterectomy    HX TUBAL LIGATION      US GUIDED CORE BREAST BIOPSY Right 2014    6:00 position Fragments of fibroadenoma, simple    US GUIDED CORE BREAST BIOPSY Right 2014    7:00 pisition: Fibrocystic mastopathy having moderate intraductal hyperplasia, apocrine metaplasia and microcalcification.        Family History   Problem Relation Age of Onset    Heart Disease Father     Heart Attack Father     Cancer Mother     Breast Cancer Mother 67    Breast Cancer Maternal Grandmother 80    Hypertension Brother     Breast Cancer Maternal Aunt 58       Social History     Socioeconomic History    Marital status:     Number of children: 2   Occupational History    Occupation: Finance, [employer]. [employer]   Tobacco Use    Smoking status: Former Smoker     Packs/day: 0.50     Years: 6.00     Pack years: 3.00     Types: Cigarettes     Quit date: 1999     Years since quittin.9    Smokeless tobacco: Never Used   Vaping Use    Vaping Use: Never used   Substance and Sexual Activity    Alcohol use: No    Drug use: No    Sexual activity: Yes     Partners: Male     Birth control/protection: None, Surgical   Social History Narrative    16 yr old son  in a MVA 2017. She has a 32 yr old daughter. She attends  [employer]. Objective:          PHYSICAL EXAMINATION:     There were no vitals taken for this visit. General: alert, cooperative, no distress, Appears well-developed and well-nourished   HENT: Normocephalic, atraumatic   Mental  status: mental status: alert, oriented to person, place, and time, normal mood, behavior, speech, dress, motor activity, and thought processes   Resp: resp: normal effort and no respiratory distress   Neuro: neuro: no gross deficits - No Facial Asymmetry (Cranial nerve 7 motor function) (limited exam due to video visit)    Skin: skin: no discoloration or lesions of concern on visible areas   MSK; Normal gait with no signs of ataxia, Normal range of motion of neck     Due to this being a TeleHealth evaluation, many elements of the physical examination are unable to be assessed. Assessment:       Mrs. Christel Armendariz is a 46year-old  lady with past medical history significant for hypertension, GERD, obesity, depression, sacral pain and chronic myalgias and arthralgias who presents for fu. Work up previously normal CBC, CMP, TSH, CCP, SSA SSB, AMPARO, normal RF, CK, hep panel, Xrays show DJD changes. Very low vit d -on replacement . Clinical picture more suggestive of noninflammatory process - all work up for autoimmune process normal.  Marissa Fears made her sleepy thus switched to pristiq  ,  continue with tylenol/tramadol. New breast cancer diagnosis 2022 -s/p extensive treatments including bilateral mastectomy, radiation and now on Arimidex. Subsequently understandably worsening sleep, anxiety/mood, myalgias, last visit we had increased Pristiq which patient admits there was helping she discontinued about a month ago, and in the past week symptoms much worse, discussed need to slowly titrate if needed again in future for now resume as below.   Also start gabapentin only at nighttime for for now. Total visit time 45 minutes, greater than half of the time was spent on counseling. Plan:       1. Patient needs letter for work stating- we are initiating titration of patient's medication regiment therefore requires adjustment of work hours to 830 to 3 PM effective immediately until March 27, 2023  2. Resume Vit d 80465 units once weekly for now   3. Resume pristiq 50mg daily   4.  tylenol 1000mg every am and pm - scheduled  5. Start gabapentin 300 mg daily at 6 PM only  6. RTC in 1 months - call if any issues with vit d            CPT Codes 60095-74650 for Established Patients may apply to this Telehealth Visit  Total visit time  45 minutes , greater than half of the time was spent on counseling. We discussed the expected course, resolution and complications of the diagnosis(es) in detail. Medication risks, benefits, costs, interactions, and alternatives were discussed as indicated. I advised her to contact the office if her condition worsens, changes or fails to improve as anticipated. She expressed understanding with the diagnosis(es) and plan. Pursuant to the emergency declaration under the [facility], 1135 waiver authority and the [employer] General Act, this Virtual  Visit was conducted, with patient's consent, to reduce the patient's risk of exposure to COVID-19 and provide continuity of care for an established patient. Services were provided through a video synchronous discussion virtually to substitute for in-person clinic visit.     Martin Arredondo MD

## 2023-04-13 ENCOUNTER — TELEMEDICINE (OUTPATIENT)
Dept: RHEUMATOLOGY | Age: 53
End: 2023-04-13
Payer: COMMERCIAL

## 2023-04-13 DIAGNOSIS — Z17.0 MALIGNANT NEOPLASM OF BREAST IN FEMALE, ESTROGEN RECEPTOR POSITIVE, UNSPECIFIED LATERALITY, UNSPECIFIED SITE OF BREAST (HCC): Primary | ICD-10-CM

## 2023-04-13 DIAGNOSIS — M79.10 MYALGIA, UNSPECIFIED SITE: ICD-10-CM

## 2023-04-13 DIAGNOSIS — M15.9 OSTEOARTHRITIS OF MULTIPLE JOINTS, UNSPECIFIED OSTEOARTHRITIS TYPE: ICD-10-CM

## 2023-04-13 DIAGNOSIS — R53.83 OTHER FATIGUE: ICD-10-CM

## 2023-04-13 DIAGNOSIS — M15.9 GENERALIZED OSTEOARTHRITIS: ICD-10-CM

## 2023-04-13 DIAGNOSIS — C50.919 MALIGNANT NEOPLASM OF BREAST IN FEMALE, ESTROGEN RECEPTOR POSITIVE, UNSPECIFIED LATERALITY, UNSPECIFIED SITE OF BREAST (HCC): Primary | ICD-10-CM

## 2023-04-13 PROCEDURE — 99215 OFFICE O/P EST HI 40 MIN: CPT | Performed by: INTERNAL MEDICINE

## 2023-04-14 RX ORDER — PREDNISONE 10 MG/1
TABLET ORAL
Qty: 20 TABLET | Refills: 0 | Status: SHIPPED | OUTPATIENT
Start: 2023-04-14

## 2023-05-09 ENCOUNTER — TELEMEDICINE (OUTPATIENT)
Dept: RHEUMATOLOGY | Age: 53
End: 2023-05-09
Payer: COMMERCIAL

## 2023-05-09 DIAGNOSIS — G62.9 NEUROPATHY: ICD-10-CM

## 2023-05-09 DIAGNOSIS — M54.16 RADICULOPATHY, LUMBAR REGION: ICD-10-CM

## 2023-05-09 DIAGNOSIS — Z79.52 LONG TERM (CURRENT) USE OF SYSTEMIC STEROIDS: ICD-10-CM

## 2023-05-09 DIAGNOSIS — M79.7 FIBROMYALGIA: ICD-10-CM

## 2023-05-09 DIAGNOSIS — M79.10 MYALGIA, UNSPECIFIED SITE: ICD-10-CM

## 2023-05-09 DIAGNOSIS — M06.4 INFLAMMATORY POLYARTHRITIS (HCC): Primary | ICD-10-CM

## 2023-05-09 DIAGNOSIS — M15.9 GENERALIZED OSTEOARTHRITIS: ICD-10-CM

## 2023-05-09 DIAGNOSIS — E55.9 HYPOVITAMINOSIS D: ICD-10-CM

## 2023-05-09 PROCEDURE — 99214 OFFICE O/P EST MOD 30 MIN: CPT | Performed by: INTERNAL MEDICINE

## 2023-05-09 NOTE — PROGRESS NOTES
visit we had conducted a trial of prednisone that showed dramatic response. Recommend switching to methotrexate however today she is on antibiotics for strep, will reassess in a week, stop methotrexate once infection resolves. Total visit time 35 minutes, greater than half of the time was spent on counseling. Plan:       1. Vit d 59919 units once weekly for now   2. Currently on antibiotics for strep, therefore will defer starting methotrexate until infection resolves. 3.  pristiq 50mg daily   4.  tylenol 1000mg every am and pm - scheduled  5. RTC in 1 weeks- call if any issues with labs,   6.  Reading material methotrexate        CPT Codes 08115-78354 for Established Patients may apply to this Telehealth Visit  Total visit time 35 minutes , greater than half of the time was spent on counseling. We discussed the expected course, resolution and complications of the diagnosis(es) in detail. Medication risks, benefits, costs, interactions, and alternatives were discussed as indicated. I advised her to contact the office if her condition worsens, changes or fails to improve as anticipated. She expressed understanding with the diagnosis(es) and plan. Pursuant to the emergency declaration under the [facility], 1135 waiver authority and the [employer] General Act, this Virtual  Visit was conducted, with patient's consent, to reduce the patient's risk of exposure to COVID-19 and provide continuity of care for an established patient. Services were provided through a video synchronous discussion virtually to substitute for in-person clinic visit.     Cathie Reddy MD

## 2023-05-09 NOTE — PATIENT INSTRUCTIONS
1.  Vit d 33796 units once weekly for now   2. Currently on antibiotics for strep, therefore will defer starting methotrexate until infection resolves. 3.  pristiq 50mg daily   4.  tylenol 1000mg every am and pm - scheduled  5.  RTC in 1 weeks- call if any issues with labs,   6.  Reading material methotrexate -

## 2023-06-09 ENCOUNTER — OFFICE VISIT (OUTPATIENT)
Dept: INTERNAL MEDICINE CLINIC | Facility: CLINIC | Age: 53
End: 2023-06-09
Payer: COMMERCIAL

## 2023-06-09 VITALS
HEART RATE: 69 BPM | TEMPERATURE: 97.9 F | WEIGHT: 195 LBS | BODY MASS INDEX: 34.55 KG/M2 | SYSTOLIC BLOOD PRESSURE: 132 MMHG | DIASTOLIC BLOOD PRESSURE: 67 MMHG | OXYGEN SATURATION: 99 % | HEIGHT: 63 IN

## 2023-06-09 DIAGNOSIS — N60.99 ATYPICAL DUCTAL HYPERPLASIA OF BREAST: ICD-10-CM

## 2023-06-09 DIAGNOSIS — I10 ESSENTIAL HYPERTENSION: Primary | ICD-10-CM

## 2023-06-09 DIAGNOSIS — K21.9 GASTROESOPHAGEAL REFLUX DISEASE WITHOUT ESOPHAGITIS: ICD-10-CM

## 2023-06-09 DIAGNOSIS — F41.8 DEPRESSION WITH ANXIETY: ICD-10-CM

## 2023-06-09 PROCEDURE — 3078F DIAST BP <80 MM HG: CPT | Performed by: INTERNAL MEDICINE

## 2023-06-09 PROCEDURE — 99214 OFFICE O/P EST MOD 30 MIN: CPT | Performed by: INTERNAL MEDICINE

## 2023-06-09 PROCEDURE — 3075F SYST BP GE 130 - 139MM HG: CPT | Performed by: INTERNAL MEDICINE

## 2023-06-09 RX ORDER — LOSARTAN POTASSIUM AND HYDROCHLOROTHIAZIDE 12.5; 5 MG/1; MG/1
1 TABLET ORAL
Qty: 90 TABLET | Refills: 1 | Status: SHIPPED | OUTPATIENT
Start: 2023-06-09

## 2023-06-09 ASSESSMENT — PATIENT HEALTH QUESTIONNAIRE - PHQ9
4. FEELING TIRED OR HAVING LITTLE ENERGY: NEARLY EVERY DAY
3. TROUBLE FALLING OR STAYING ASLEEP: 3
8. MOVING OR SPEAKING SO SLOWLY THAT OTHER PEOPLE COULD HAVE NOTICED. OR THE OPPOSITE - BEING SO FIDGETY OR RESTLESS THAT YOU HAVE BEEN MOVING AROUND A LOT MORE THAN USUAL: NOT AT ALL
6. FEELING BAD ABOUT YOURSELF - OR THAT YOU ARE A FAILURE OR HAVE LET YOURSELF OR YOUR FAMILY DOWN: NEARLY EVERY DAY
10. IF YOU CHECKED OFF ANY PROBLEMS, HOW DIFFICULT HAVE THESE PROBLEMS MADE IT FOR YOU TO DO YOUR WORK, TAKE CARE OF THINGS AT HOME, OR GET ALONG WITH OTHER PEOPLE: 3
3. TROUBLE FALLING OR STAYING ASLEEP: NEARLY EVERY DAY
5. POOR APPETITE OR OVEREATING: 3
6. FEELING BAD ABOUT YOURSELF - OR THAT YOU ARE A FAILURE OR HAVE LET YOURSELF OR YOUR FAMILY DOWN: 3
8. MOVING OR SPEAKING SO SLOWLY THAT OTHER PEOPLE COULD HAVE NOTICED. OR THE OPPOSITE, BEING SO FIGETY OR RESTLESS THAT YOU HAVE BEEN MOVING AROUND A LOT MORE THAN USUAL: 0
SUM OF ALL RESPONSES TO PHQ QUESTIONS 1-9: 15
SUM OF ALL RESPONSES TO PHQ QUESTIONS 1-9: 15
5. POOR APPETITE OR OVEREATING: NEARLY EVERY DAY
7. TROUBLE CONCENTRATING ON THINGS, SUCH AS READING THE NEWSPAPER OR WATCHING TELEVISION: 3
4. FEELING TIRED OR HAVING LITTLE ENERGY: 3
SUM OF ALL RESPONSES TO PHQ QUESTIONS 1-9: 15
SUM OF ALL RESPONSES TO PHQ QUESTIONS 1-9: 15
10. IF YOU CHECKED OFF ANY PROBLEMS, HOW DIFFICULT HAVE THESE PROBLEMS MADE IT FOR YOU TO DO YOUR WORK, TAKE CARE OF THINGS AT HOME, OR GET ALONG WITH OTHER PEOPLE: EXTREMELY DIFFICULT
7. TROUBLE CONCENTRATING ON THINGS, SUCH AS READING THE NEWSPAPER OR WATCHING TELEVISION: NEARLY EVERY DAY
9. THOUGHTS THAT YOU WOULD BE BETTER OFF DEAD, OR OF HURTING YOURSELF: NOT AT ALL
9. THOUGHTS THAT YOU WOULD BE BETTER OFF DEAD, OR OF HURTING YOURSELF: 0

## 2023-06-09 ASSESSMENT — ANXIETY QUESTIONNAIRES
4. TROUBLE RELAXING: 0
7. FEELING AFRAID AS IF SOMETHING AWFUL MIGHT HAPPEN: 0
6. BECOMING EASILY ANNOYED OR IRRITABLE: 0
IF YOU CHECKED OFF ANY PROBLEMS ON THIS QUESTIONNAIRE, HOW DIFFICULT HAVE THESE PROBLEMS MADE IT FOR YOU TO DO YOUR WORK, TAKE CARE OF THINGS AT HOME, OR GET ALONG WITH OTHER PEOPLE: NOT DIFFICULT AT ALL
GAD7 TOTAL SCORE: 0
2. NOT BEING ABLE TO STOP OR CONTROL WORRYING: 0
3. WORRYING TOO MUCH ABOUT DIFFERENT THINGS: 0
5. BEING SO RESTLESS THAT IT IS HARD TO SIT STILL: 0
1. FEELING NERVOUS, ANXIOUS, OR ON EDGE: 0

## 2023-06-09 ASSESSMENT — ENCOUNTER SYMPTOMS: SHORTNESS OF BREATH: 0

## 2023-06-09 NOTE — PROGRESS NOTES
Take 1 tablet by mouth daily 30 tablet 3    LORazepam (ATIVAN) 1 MG tablet Take 1 tablet by mouth daily as needed. No current facility-administered medications for this visit. Social History:  Social History     Tobacco Use    Smoking status: Former     Packs/day: 0.50     Years: 15.00     Pack years: 7.50     Types: Cigarettes     Quit date: 1999     Years since quittin.3    Smokeless tobacco: Never   Substance Use Topics    Alcohol use: No     Family History  Family History   Problem Relation Age of Onset    Heart Attack Father     Breast Cancer Maternal Grandmother 80    Breast Cancer Mother 67    Heart Disease Father     Breast Cancer Maternal Aunt 58    Cancer Mother     Hypertension Brother        Review of Systems   Constitutional:  Negative for chills, fatigue and fever. Respiratory:  Negative for shortness of breath. Musculoskeletal:  Negative for gait problem. Skin:  Negative for rash. Neurological:  Negative for speech difficulty. Psychiatric/Behavioral:  Positive for dysphoric mood. The patient is not nervous/anxious. Vital Signs  /67 (Site: Right Upper Arm, Position: Sitting, Cuff Size: Large Adult)   Pulse 69   Temp 97.9 °F (36.6 °C) (Temporal)   Ht 5' 3\" (1.6 m)   Wt 195 lb (88.5 kg)   SpO2 99%   BMI 34.54 kg/m²   Body mass index is 34.54 kg/m². Physical Exam  Vitals reviewed. Constitutional:       General: She is not in acute distress. Appearance: Normal appearance. She is not ill-appearing. HENT:      Head: Normocephalic and atraumatic. Eyes:      General: No scleral icterus. Conjunctiva/sclera: Conjunctivae normal.   Neck:      Vascular: No carotid bruit. Cardiovascular:      Rate and Rhythm: Normal rate and regular rhythm. Heart sounds: Normal heart sounds. No murmur heard. Pulmonary:      Effort: Pulmonary effort is normal.      Breath sounds: Normal breath sounds. Musculoskeletal:         General: No swelling.    Skin:

## 2023-06-17 DIAGNOSIS — I10 ESSENTIAL HYPERTENSION: ICD-10-CM

## 2023-06-19 RX ORDER — LOSARTAN POTASSIUM AND HYDROCHLOROTHIAZIDE 12.5; 5 MG/1; MG/1
1 TABLET ORAL
Qty: 90 TABLET | Refills: 1 | OUTPATIENT
Start: 2023-06-19

## 2023-07-17 ENCOUNTER — ANESTHESIA EVENT (OUTPATIENT)
Dept: SURGERY | Age: 53
End: 2023-07-17
Payer: COMMERCIAL

## 2023-07-18 ENCOUNTER — ANESTHESIA (OUTPATIENT)
Dept: SURGERY | Age: 53
End: 2023-07-18
Payer: COMMERCIAL

## 2023-07-18 ENCOUNTER — HOSPITAL ENCOUNTER (OUTPATIENT)
Age: 53
Setting detail: OUTPATIENT SURGERY
Discharge: HOME | End: 2023-07-18
Attending: SURGERY | Admitting: SURGERY
Payer: COMMERCIAL

## 2023-07-18 VITALS
WEIGHT: 194.1 LBS | OXYGEN SATURATION: 92 % | BODY MASS INDEX: 34.39 KG/M2 | TEMPERATURE: 97.8 F | DIASTOLIC BLOOD PRESSURE: 66 MMHG | HEIGHT: 63 IN | RESPIRATION RATE: 16 BRPM | SYSTOLIC BLOOD PRESSURE: 120 MMHG | HEART RATE: 68 BPM

## 2023-07-18 PROBLEM — Z85.3 PERSONAL HISTORY OF MALIGNANT NEOPLASM OF BREAST: Status: ACTIVE | Noted: 2023-07-18

## 2023-07-18 PROBLEM — Z90.13 ACQUIRED ABSENCE OF BOTH BREASTS AND NIPPLES: Status: ACTIVE | Noted: 2023-07-18

## 2023-07-18 PROBLEM — N65.0 DEFORMITY OF RECONSTRUCTED BREAST: Status: ACTIVE | Noted: 2023-07-18

## 2023-07-18 PROBLEM — N65.1 BREAST RECONSTRUCTION DISPROPORTION: Status: ACTIVE | Noted: 2023-07-18

## 2023-07-18 PROBLEM — Z92.3 PERSONAL HISTORY OF IRRADIATION, PRESENTING HAZARDS TO HEALTH: Status: ACTIVE | Noted: 2023-07-18

## 2023-07-18 LAB
CREAT SERPL-MCNC: 0.87 MG/DL (ref 0.6–1)
HGB BLD-MCNC: 14.1 G/DL (ref 11.7–15.4)
POTASSIUM BLD-SCNC: 4.5 MMOL/L (ref 3.5–5.1)

## 2023-07-18 PROCEDURE — L8000 MASTECTOMY BRA: HCPCS | Performed by: SURGERY

## 2023-07-18 PROCEDURE — 2500000003 HC RX 250 WO HCPCS: Performed by: SURGERY

## 2023-07-18 PROCEDURE — 7100000000 HC PACU RECOVERY - FIRST 15 MIN: Performed by: SURGERY

## 2023-07-18 PROCEDURE — 2580000003 HC RX 258: Performed by: ANESTHESIOLOGY

## 2023-07-18 PROCEDURE — 6360000002 HC RX W HCPCS: Performed by: NURSE ANESTHETIST, CERTIFIED REGISTERED

## 2023-07-18 PROCEDURE — 82565 ASSAY OF CREATININE: CPT

## 2023-07-18 PROCEDURE — 3700000000 HC ANESTHESIA ATTENDED CARE: Performed by: SURGERY

## 2023-07-18 PROCEDURE — 7100000001 HC PACU RECOVERY - ADDTL 15 MIN: Performed by: SURGERY

## 2023-07-18 PROCEDURE — 7100000011 HC PHASE II RECOVERY - ADDTL 15 MIN: Performed by: SURGERY

## 2023-07-18 PROCEDURE — 6370000000 HC RX 637 (ALT 250 FOR IP)

## 2023-07-18 PROCEDURE — 7100000010 HC PHASE II RECOVERY - FIRST 15 MIN: Performed by: SURGERY

## 2023-07-18 PROCEDURE — 6360000002 HC RX W HCPCS: Performed by: SURGERY

## 2023-07-18 PROCEDURE — 2580000003 HC RX 258: Performed by: SURGERY

## 2023-07-18 PROCEDURE — 2709999900 HC NON-CHARGEABLE SUPPLY: Performed by: SURGERY

## 2023-07-18 PROCEDURE — 6360000002 HC RX W HCPCS: Performed by: ANESTHESIOLOGY

## 2023-07-18 PROCEDURE — 6360000002 HC RX W HCPCS: Performed by: REGISTERED NURSE

## 2023-07-18 PROCEDURE — 3600000014 HC SURGERY LEVEL 4 ADDTL 15MIN: Performed by: SURGERY

## 2023-07-18 PROCEDURE — 85018 HEMOGLOBIN: CPT

## 2023-07-18 PROCEDURE — 6370000000 HC RX 637 (ALT 250 FOR IP): Performed by: ANESTHESIOLOGY

## 2023-07-18 PROCEDURE — 3700000001 HC ADD 15 MINUTES (ANESTHESIA): Performed by: SURGERY

## 2023-07-18 PROCEDURE — 3600000004 HC SURGERY LEVEL 4 BASE: Performed by: SURGERY

## 2023-07-18 PROCEDURE — 84132 ASSAY OF SERUM POTASSIUM: CPT

## 2023-07-18 PROCEDURE — 2500000003 HC RX 250 WO HCPCS: Performed by: NURSE ANESTHETIST, CERTIFIED REGISTERED

## 2023-07-18 RX ORDER — HALOPERIDOL 5 MG/ML
1 INJECTION INTRAMUSCULAR
Status: DISCONTINUED | OUTPATIENT
Start: 2023-07-18 | End: 2023-07-18 | Stop reason: HOSPADM

## 2023-07-18 RX ORDER — GLYCOPYRROLATE 0.2 MG/ML
INJECTION INTRAMUSCULAR; INTRAVENOUS PRN
Status: DISCONTINUED | OUTPATIENT
Start: 2023-07-18 | End: 2023-07-18 | Stop reason: SDUPTHER

## 2023-07-18 RX ORDER — LABETALOL HYDROCHLORIDE 5 MG/ML
10 INJECTION, SOLUTION INTRAVENOUS
Status: DISCONTINUED | OUTPATIENT
Start: 2023-07-18 | End: 2023-07-18 | Stop reason: HOSPADM

## 2023-07-18 RX ORDER — KETOROLAC TROMETHAMINE 30 MG/ML
30 INJECTION, SOLUTION INTRAMUSCULAR; INTRAVENOUS ONCE
Status: COMPLETED | OUTPATIENT
Start: 2023-07-18 | End: 2023-07-18

## 2023-07-18 RX ORDER — ACETAMINOPHEN 500 MG
1000 TABLET ORAL ONCE
Status: COMPLETED | OUTPATIENT
Start: 2023-07-18 | End: 2023-07-18

## 2023-07-18 RX ORDER — SODIUM CHLORIDE 9 MG/ML
INJECTION, SOLUTION INTRAVENOUS PRN
Status: DISCONTINUED | OUTPATIENT
Start: 2023-07-18 | End: 2023-07-18 | Stop reason: HOSPADM

## 2023-07-18 RX ORDER — FENTANYL CITRATE 50 UG/ML
100 INJECTION, SOLUTION INTRAMUSCULAR; INTRAVENOUS
Status: DISCONTINUED | OUTPATIENT
Start: 2023-07-18 | End: 2023-07-18 | Stop reason: HOSPADM

## 2023-07-18 RX ORDER — LIDOCAINE HYDROCHLORIDE 20 MG/ML
INJECTION, SOLUTION EPIDURAL; INFILTRATION; INTRACAUDAL; PERINEURAL PRN
Status: DISCONTINUED | OUTPATIENT
Start: 2023-07-18 | End: 2023-07-18 | Stop reason: SDUPTHER

## 2023-07-18 RX ORDER — FENTANYL CITRATE 50 UG/ML
INJECTION, SOLUTION INTRAMUSCULAR; INTRAVENOUS PRN
Status: DISCONTINUED | OUTPATIENT
Start: 2023-07-18 | End: 2023-07-18 | Stop reason: SDUPTHER

## 2023-07-18 RX ORDER — PROPOFOL 10 MG/ML
INJECTION, EMULSION INTRAVENOUS PRN
Status: DISCONTINUED | OUTPATIENT
Start: 2023-07-18 | End: 2023-07-18 | Stop reason: SDUPTHER

## 2023-07-18 RX ORDER — NEOSTIGMINE METHYLSULFATE 1 MG/ML
INJECTION, SOLUTION INTRAVENOUS PRN
Status: DISCONTINUED | OUTPATIENT
Start: 2023-07-18 | End: 2023-07-18 | Stop reason: SDUPTHER

## 2023-07-18 RX ORDER — ROCURONIUM BROMIDE 10 MG/ML
INJECTION, SOLUTION INTRAVENOUS PRN
Status: DISCONTINUED | OUTPATIENT
Start: 2023-07-18 | End: 2023-07-18 | Stop reason: SDUPTHER

## 2023-07-18 RX ORDER — EPHEDRINE SULFATE/0.9% NACL/PF 50 MG/5 ML
SYRINGE (ML) INTRAVENOUS PRN
Status: DISCONTINUED | OUTPATIENT
Start: 2023-07-18 | End: 2023-07-18 | Stop reason: SDUPTHER

## 2023-07-18 RX ORDER — KETAMINE HYDROCHLORIDE 50 MG/ML
INJECTION, SOLUTION, CONCENTRATE INTRAMUSCULAR; INTRAVENOUS PRN
Status: DISCONTINUED | OUTPATIENT
Start: 2023-07-18 | End: 2023-07-18 | Stop reason: SDUPTHER

## 2023-07-18 RX ORDER — ONDANSETRON 2 MG/ML
INJECTION INTRAMUSCULAR; INTRAVENOUS PRN
Status: DISCONTINUED | OUTPATIENT
Start: 2023-07-18 | End: 2023-07-18 | Stop reason: SDUPTHER

## 2023-07-18 RX ORDER — HALOPERIDOL 5 MG/ML
1 INJECTION INTRAMUSCULAR
Status: DISCONTINUED | OUTPATIENT
Start: 2023-07-18 | End: 2023-07-18

## 2023-07-18 RX ORDER — PROCHLORPERAZINE EDISYLATE 5 MG/ML
5 INJECTION INTRAMUSCULAR; INTRAVENOUS
Status: COMPLETED | OUTPATIENT
Start: 2023-07-18 | End: 2023-07-18

## 2023-07-18 RX ORDER — SCOLOPAMINE TRANSDERMAL SYSTEM 1 MG/1
1 PATCH, EXTENDED RELEASE TRANSDERMAL ONCE
Status: DISCONTINUED | OUTPATIENT
Start: 2023-07-18 | End: 2023-07-18 | Stop reason: HOSPADM

## 2023-07-18 RX ORDER — GINSENG 100 MG
CAPSULE ORAL PRN
Status: DISCONTINUED | OUTPATIENT
Start: 2023-07-18 | End: 2023-07-18 | Stop reason: ALTCHOICE

## 2023-07-18 RX ORDER — LIDOCAINE HYDROCHLORIDE 10 MG/ML
1 INJECTION, SOLUTION INFILTRATION; PERINEURAL
Status: DISCONTINUED | OUTPATIENT
Start: 2023-07-18 | End: 2023-07-18 | Stop reason: HOSPADM

## 2023-07-18 RX ORDER — MIDAZOLAM HYDROCHLORIDE 2 MG/2ML
2 INJECTION, SOLUTION INTRAMUSCULAR; INTRAVENOUS
Status: COMPLETED | OUTPATIENT
Start: 2023-07-18 | End: 2023-07-18

## 2023-07-18 RX ORDER — SODIUM CHLORIDE 0.9 % (FLUSH) 0.9 %
5-40 SYRINGE (ML) INJECTION EVERY 12 HOURS SCHEDULED
Status: DISCONTINUED | OUTPATIENT
Start: 2023-07-18 | End: 2023-07-18 | Stop reason: HOSPADM

## 2023-07-18 RX ORDER — SCOLOPAMINE TRANSDERMAL SYSTEM 1 MG/1
PATCH, EXTENDED RELEASE TRANSDERMAL
Status: DISCONTINUED
Start: 2023-07-18 | End: 2023-07-18 | Stop reason: HOSPADM

## 2023-07-18 RX ORDER — DEXAMETHASONE SODIUM PHOSPHATE 10 MG/ML
INJECTION INTRAMUSCULAR; INTRAVENOUS PRN
Status: DISCONTINUED | OUTPATIENT
Start: 2023-07-18 | End: 2023-07-18 | Stop reason: SDUPTHER

## 2023-07-18 RX ORDER — ONDANSETRON 2 MG/ML
4 INJECTION INTRAMUSCULAR; INTRAVENOUS
Status: COMPLETED | OUTPATIENT
Start: 2023-07-18 | End: 2023-07-18

## 2023-07-18 RX ORDER — HYDROMORPHONE HCL 110MG/55ML
PATIENT CONTROLLED ANALGESIA SYRINGE INTRAVENOUS PRN
Status: DISCONTINUED | OUTPATIENT
Start: 2023-07-18 | End: 2023-07-18 | Stop reason: SDUPTHER

## 2023-07-18 RX ORDER — SODIUM CHLORIDE, SODIUM LACTATE, POTASSIUM CHLORIDE, CALCIUM CHLORIDE 600; 310; 30; 20 MG/100ML; MG/100ML; MG/100ML; MG/100ML
INJECTION, SOLUTION INTRAVENOUS CONTINUOUS
Status: DISCONTINUED | OUTPATIENT
Start: 2023-07-18 | End: 2023-07-18 | Stop reason: HOSPADM

## 2023-07-18 RX ORDER — HEPARIN SODIUM 5000 [USP'U]/ML
5000 INJECTION, SOLUTION INTRAVENOUS; SUBCUTANEOUS ONCE
Status: COMPLETED | OUTPATIENT
Start: 2023-07-18 | End: 2023-07-18

## 2023-07-18 RX ORDER — OXYCODONE HYDROCHLORIDE 5 MG/1
5 TABLET ORAL
Status: COMPLETED | OUTPATIENT
Start: 2023-07-18 | End: 2023-07-18

## 2023-07-18 RX ORDER — SODIUM CHLORIDE 0.9 % (FLUSH) 0.9 %
5-40 SYRINGE (ML) INJECTION PRN
Status: DISCONTINUED | OUTPATIENT
Start: 2023-07-18 | End: 2023-07-18 | Stop reason: HOSPADM

## 2023-07-18 RX ORDER — HYDRALAZINE HYDROCHLORIDE 20 MG/ML
10 INJECTION INTRAMUSCULAR; INTRAVENOUS
Status: DISCONTINUED | OUTPATIENT
Start: 2023-07-18 | End: 2023-07-18 | Stop reason: HOSPADM

## 2023-07-18 RX ADMIN — PHENYLEPHRINE HYDROCHLORIDE 100 MCG: 0.1 INJECTION, SOLUTION INTRAVENOUS at 07:49

## 2023-07-18 RX ADMIN — DEXAMETHASONE SODIUM PHOSPHATE 10 MG: 10 INJECTION INTRAMUSCULAR; INTRAVENOUS at 07:49

## 2023-07-18 RX ADMIN — Medication 10 MG: at 07:54

## 2023-07-18 RX ADMIN — Medication 2000 MG: at 07:46

## 2023-07-18 RX ADMIN — Medication 5 MG: at 08:34

## 2023-07-18 RX ADMIN — PHENYLEPHRINE HYDROCHLORIDE 100 MCG: 0.1 INJECTION, SOLUTION INTRAVENOUS at 09:01

## 2023-07-18 RX ADMIN — PHENYLEPHRINE HYDROCHLORIDE 50 MCG: 0.1 INJECTION, SOLUTION INTRAVENOUS at 07:58

## 2023-07-18 RX ADMIN — FENTANYL CITRATE 100 MCG: 50 INJECTION, SOLUTION INTRAMUSCULAR; INTRAVENOUS at 07:39

## 2023-07-18 RX ADMIN — ONDANSETRON 4 MG: 2 INJECTION INTRAMUSCULAR; INTRAVENOUS at 09:23

## 2023-07-18 RX ADMIN — Medication 5 MG: at 09:03

## 2023-07-18 RX ADMIN — PHENYLEPHRINE HYDROCHLORIDE 50 MCG: 0.1 INJECTION, SOLUTION INTRAVENOUS at 08:34

## 2023-07-18 RX ADMIN — HYDROMORPHONE HYDROCHLORIDE 0.5 MG: 2 INJECTION INTRAMUSCULAR; INTRAVENOUS; SUBCUTANEOUS at 09:17

## 2023-07-18 RX ADMIN — HEPARIN SODIUM 5000 UNITS: 5000 INJECTION INTRAVENOUS; SUBCUTANEOUS at 06:47

## 2023-07-18 RX ADMIN — Medication 5 MG: at 07:58

## 2023-07-18 RX ADMIN — PROPOFOL 200 MG: 10 INJECTION, EMULSION INTRAVENOUS at 07:40

## 2023-07-18 RX ADMIN — PROCHLORPERAZINE EDISYLATE 5 MG: 5 INJECTION INTRAMUSCULAR; INTRAVENOUS at 09:36

## 2023-07-18 RX ADMIN — KETAMINE HYDROCHLORIDE 20 MG: 50 INJECTION, SOLUTION INTRAMUSCULAR; INTRAVENOUS at 07:58

## 2023-07-18 RX ADMIN — PHENYLEPHRINE HYDROCHLORIDE 100 MCG: 0.1 INJECTION, SOLUTION INTRAVENOUS at 08:50

## 2023-07-18 RX ADMIN — LIDOCAINE HYDROCHLORIDE 100 MG: 20 INJECTION, SOLUTION EPIDURAL; INFILTRATION; INTRACAUDAL; PERINEURAL at 07:40

## 2023-07-18 RX ADMIN — OXYCODONE HYDROCHLORIDE 5 MG: 5 TABLET ORAL at 09:53

## 2023-07-18 RX ADMIN — SODIUM CHLORIDE, SODIUM LACTATE, POTASSIUM CHLORIDE, AND CALCIUM CHLORIDE: 600; 310; 30; 20 INJECTION, SOLUTION INTRAVENOUS at 08:19

## 2023-07-18 RX ADMIN — GLYCOPYRROLATE 0.1 MG: 0.2 INJECTION INTRAMUSCULAR; INTRAVENOUS at 07:48

## 2023-07-18 RX ADMIN — Medication 10 MG: at 07:46

## 2023-07-18 RX ADMIN — ACETAMINOPHEN 1000 MG: 500 TABLET, FILM COATED ORAL at 06:47

## 2023-07-18 RX ADMIN — HYDROMORPHONE HYDROCHLORIDE 0.5 MG: 1 INJECTION, SOLUTION INTRAMUSCULAR; INTRAVENOUS; SUBCUTANEOUS at 09:31

## 2023-07-18 RX ADMIN — SODIUM CHLORIDE, SODIUM LACTATE, POTASSIUM CHLORIDE, AND CALCIUM CHLORIDE: 600; 310; 30; 20 INJECTION, SOLUTION INTRAVENOUS at 06:33

## 2023-07-18 RX ADMIN — ONDANSETRON 4 MG: 2 INJECTION INTRAMUSCULAR; INTRAVENOUS at 07:49

## 2023-07-18 RX ADMIN — HYDROMORPHONE HYDROCHLORIDE 0.5 MG: 1 INJECTION, SOLUTION INTRAMUSCULAR; INTRAVENOUS; SUBCUTANEOUS at 09:37

## 2023-07-18 RX ADMIN — MIDAZOLAM 2 MG: 1 INJECTION INTRAMUSCULAR; INTRAVENOUS at 07:29

## 2023-07-18 RX ADMIN — GLYCOPYRROLATE 0.5 MG: 0.2 INJECTION INTRAMUSCULAR; INTRAVENOUS at 09:03

## 2023-07-18 RX ADMIN — ROCURONIUM BROMIDE 50 MG: 10 INJECTION, SOLUTION INTRAVENOUS at 07:40

## 2023-07-18 RX ADMIN — HYDROMORPHONE HYDROCHLORIDE 0.5 MG: 2 INJECTION INTRAMUSCULAR; INTRAVENOUS; SUBCUTANEOUS at 08:19

## 2023-07-18 RX ADMIN — KETOROLAC TROMETHAMINE 30 MG: 30 INJECTION, SOLUTION INTRAMUSCULAR; INTRAVENOUS at 10:06

## 2023-07-18 ASSESSMENT — PAIN DESCRIPTION - PAIN TYPE: TYPE: SURGICAL PAIN

## 2023-07-18 ASSESSMENT — PAIN DESCRIPTION - LOCATION: LOCATION: ABDOMEN;BREAST

## 2023-07-18 ASSESSMENT — PAIN SCALES - GENERAL
PAINLEVEL_OUTOF10: 10
PAINLEVEL_OUTOF10: 9
PAINLEVEL_OUTOF10: 10
PAINLEVEL_OUTOF10: 10
PAINLEVEL_OUTOF10: 0
PAINLEVEL_OUTOF10: 10

## 2023-07-18 ASSESSMENT — PAIN DESCRIPTION - DESCRIPTORS: DESCRIPTORS: DISCOMFORT;SORE

## 2023-07-18 ASSESSMENT — PAIN DESCRIPTION - FREQUENCY: FREQUENCY: CONTINUOUS

## 2023-07-18 ASSESSMENT — PAIN DESCRIPTION - ONSET: ONSET: PROGRESSIVE

## 2023-07-18 ASSESSMENT — PAIN DESCRIPTION - ORIENTATION: ORIENTATION: RIGHT;LEFT

## 2023-07-18 NOTE — ADDENDUM NOTE
Addendum  created 07/18/23 1002 by Jessica Graves MD    Clinical Note Signed, Order list changed, Pharmacy for encounter modified

## 2023-07-18 NOTE — H&P
Plastic Surgery H&P    HPI:  48yo F with h/o breast cancer, s/p bilateral implant placement for reconstruction. She now presents for the next stage of her reconstruction which will consist of bilateral breast reconstruction revision with fat grafting and possible soft tissue revision. She understands the plan and desires to proceed. She denies any recent illness, infection, or changes to her medications since her most recent preop visit. Past Medical History:   Diagnosis Date    Agatston coronary artery calcium score less than 100 03/2018    CAC 0    Cancer ([address])     Depression with anxiety     Essential hypertension     Family history of breast cancer     H/O right breast biopsy 6/2014, 7/2014    Menopause     Obesity, Class I, BMI 30-34.9     PUD (peptic ulcer disease)     Pulmonary nodules 03/2018    3 mm right upper lobe nodule and 2 mm left upper lobe nodule. Per pt no growth.      Sacral pain      Past Surgical History:   Procedure Laterality Date    BREAST BIOPSY Left 01/14/2022    stereo bx     BREAST BIOPSY  7/2/2014    RIGHT BREAST NEEDLE LOCALIZED BIOPSY X2    performed by Diane Jamison MD at [address]  06/2014    BREAST RECONSTRUCTION Bilateral 4/7/2022    BIALTERAL BREAST RECONSTRUCTION STAGE 1 WITH TISSUE EXPANDERS AND ADM performed by Daniel Lafleur MD at [facility] Bilateral 11/2022    [address]    COLONOSCOPY N/A 4/11/2019    COLONOSCOPY/ 27 performed by Freya Darling DO at [address] [facility]  07/2018    RANDEE STEROTACTIC LOC BREAST BIOPSY LEFT Left 01/14/2022    RANDEE STEROTACTIC LOC BREAST BIOPSY LEFT 1/14/2022 [facility]    MASTECTOMY Bilateral 4/7/2022    BILATERAL BREAST MASTECTOMY performed by Wendy Burkett MD at [address] (CERVIX REMOVED)  08/18/2021    full hysterectomy    TUBAL LIGATION  1999    US GUIDED CORE BREAST BIOPSY Right 6/5/2014    7:00 pisition: Fibrocystic

## 2023-07-31 DIAGNOSIS — C50.912 MALIGNANT NEOPLASM OF LEFT BREAST IN FEMALE, ESTROGEN RECEPTOR POSITIVE, UNSPECIFIED SITE OF BREAST (HCC): Primary | ICD-10-CM

## 2023-07-31 DIAGNOSIS — Z17.0 MALIGNANT NEOPLASM OF LEFT BREAST IN FEMALE, ESTROGEN RECEPTOR POSITIVE, UNSPECIFIED SITE OF BREAST (HCC): Primary | ICD-10-CM

## 2023-08-02 ENCOUNTER — HOSPITAL ENCOUNTER (OUTPATIENT)
Dept: LAB | Age: 53
Discharge: HOME | End: 2023-08-05
Payer: COMMERCIAL

## 2023-08-02 ENCOUNTER — OFFICE VISIT (OUTPATIENT)
Dept: ONCOLOGY | Age: 53
End: 2023-08-02
Payer: COMMERCIAL

## 2023-08-02 VITALS
OXYGEN SATURATION: 98 % | DIASTOLIC BLOOD PRESSURE: 80 MMHG | HEART RATE: 66 BPM | TEMPERATURE: 98.2 F | BODY MASS INDEX: 34.46 KG/M2 | RESPIRATION RATE: 16 BRPM | HEIGHT: 63 IN | WEIGHT: 194.5 LBS | SYSTOLIC BLOOD PRESSURE: 125 MMHG

## 2023-08-02 DIAGNOSIS — R53.83 FATIGUE, UNSPECIFIED TYPE: ICD-10-CM

## 2023-08-02 DIAGNOSIS — R23.2 HOT FLASHES: ICD-10-CM

## 2023-08-02 DIAGNOSIS — Z17.0 MALIGNANT NEOPLASM OF LEFT BREAST IN FEMALE, ESTROGEN RECEPTOR POSITIVE, UNSPECIFIED SITE OF BREAST (HCC): ICD-10-CM

## 2023-08-02 DIAGNOSIS — Z17.0 MALIGNANT NEOPLASM OF LEFT BREAST IN FEMALE, ESTROGEN RECEPTOR POSITIVE, UNSPECIFIED SITE OF BREAST (HCC): Primary | ICD-10-CM

## 2023-08-02 DIAGNOSIS — C50.912 MALIGNANT NEOPLASM OF LEFT BREAST IN FEMALE, ESTROGEN RECEPTOR POSITIVE, UNSPECIFIED SITE OF BREAST (HCC): Primary | ICD-10-CM

## 2023-08-02 DIAGNOSIS — C50.912 MALIGNANT NEOPLASM OF LEFT BREAST IN FEMALE, ESTROGEN RECEPTOR POSITIVE, UNSPECIFIED SITE OF BREAST (HCC): ICD-10-CM

## 2023-08-02 LAB
ALBUMIN SERPL-MCNC: 3.8 G/DL (ref 3.5–5)
ALBUMIN/GLOB SERPL: 0.9 (ref 0.4–1.6)
ALP SERPL-CCNC: 136 U/L (ref 50–136)
ALT SERPL-CCNC: 85 U/L (ref 12–65)
ANION GAP SERPL CALC-SCNC: 5 MMOL/L (ref 2–11)
AST SERPL-CCNC: 65 U/L (ref 15–37)
BASOPHILS # BLD: 0 K/UL (ref 0–0.2)
BASOPHILS NFR BLD: 1 % (ref 0–2)
BILIRUB SERPL-MCNC: 0.4 MG/DL (ref 0.2–1.1)
BUN SERPL-MCNC: 15 MG/DL (ref 6–23)
CALCIUM SERPL-MCNC: 9.9 MG/DL (ref 8.3–10.4)
CHLORIDE SERPL-SCNC: 104 MMOL/L (ref 101–110)
CO2 SERPL-SCNC: 29 MMOL/L (ref 21–32)
CREAT SERPL-MCNC: 1 MG/DL (ref 0.6–1)
DIFFERENTIAL METHOD BLD: ABNORMAL
EOSINOPHIL # BLD: 0.2 K/UL (ref 0–0.8)
EOSINOPHIL NFR BLD: 3 % (ref 0.5–7.8)
ERYTHROCYTE [DISTWIDTH] IN BLOOD BY AUTOMATED COUNT: 15.6 % (ref 11.9–14.6)
FERRITIN SERPL-MCNC: 122 NG/ML (ref 8–388)
GLOBULIN SER CALC-MCNC: 4.2 G/DL (ref 2.8–4.5)
GLUCOSE SERPL-MCNC: 123 MG/DL (ref 65–100)
HCT VFR BLD AUTO: 41.5 % (ref 35.8–46.3)
HGB BLD-MCNC: 13.6 G/DL (ref 11.7–15.4)
IMM GRANULOCYTES # BLD AUTO: 0 K/UL (ref 0–0.5)
IMM GRANULOCYTES NFR BLD AUTO: 0 % (ref 0–5)
IRON SATN MFR SERPL: 20 %
IRON SERPL-MCNC: 78 UG/DL (ref 35–150)
LYMPHOCYTES # BLD: 1.9 K/UL (ref 0.5–4.6)
LYMPHOCYTES NFR BLD: 34 % (ref 13–44)
MCH RBC QN AUTO: 29 PG (ref 26.1–32.9)
MCHC RBC AUTO-ENTMCNC: 32.8 G/DL (ref 31.4–35)
MCV RBC AUTO: 88.5 FL (ref 82–102)
MONOCYTES # BLD: 0.3 K/UL (ref 0.1–1.3)
MONOCYTES NFR BLD: 6 % (ref 4–12)
NEUTS SEG # BLD: 3.1 K/UL (ref 1.7–8.2)
NEUTS SEG NFR BLD: 56 % (ref 43–78)
NRBC # BLD: 0 K/UL (ref 0–0.2)
PLATELET # BLD AUTO: 358 K/UL (ref 150–450)
PMV BLD AUTO: 10 FL (ref 9.4–12.3)
POTASSIUM SERPL-SCNC: 3.4 MMOL/L (ref 3.5–5.1)
PROT SERPL-MCNC: 8 G/DL (ref 6.3–8.2)
RBC # BLD AUTO: 4.69 M/UL (ref 4.05–5.2)
SODIUM SERPL-SCNC: 138 MMOL/L (ref 133–143)
TIBC SERPL-MCNC: 388 UG/DL (ref 250–450)
VIT B12 SERPL-MCNC: 300 PG/ML (ref 193–986)
WBC # BLD AUTO: 5.5 K/UL (ref 4.3–11.1)

## 2023-08-02 PROCEDURE — 85025 COMPLETE CBC W/AUTO DIFF WBC: CPT

## 2023-08-02 PROCEDURE — 80053 COMPREHEN METABOLIC PANEL: CPT

## 2023-08-02 PROCEDURE — 36415 COLL VENOUS BLD VENIPUNCTURE: CPT

## 2023-08-02 PROCEDURE — 99214 OFFICE O/P EST MOD 30 MIN: CPT | Performed by: NURSE PRACTITIONER

## 2023-08-02 PROCEDURE — 83550 IRON BINDING TEST: CPT

## 2023-08-02 PROCEDURE — 82728 ASSAY OF FERRITIN: CPT

## 2023-08-02 PROCEDURE — 3074F SYST BP LT 130 MM HG: CPT | Performed by: NURSE PRACTITIONER

## 2023-08-02 PROCEDURE — 82607 VITAMIN B-12: CPT

## 2023-08-02 PROCEDURE — 3078F DIAST BP <80 MM HG: CPT | Performed by: NURSE PRACTITIONER

## 2023-08-02 PROCEDURE — 83540 ASSAY OF IRON: CPT

## 2023-08-02 NOTE — PROGRESS NOTES
[address] [facility] Hematology and Oncology: Office Visit Established Patient    Chief Complaint:    Chief Complaint   Patient presents with    Follow-up         History of Present Illness:  Ms. Angel Sanchez is a 45 y.o. female who returns today for management of breast cancer. She initially presented for a routine bilateral screening mammogram on 1/8/22 which identified left breast calcifications. Further evaluation with left breast diagnostic mammogram on 1/13/22 confirmed the  presence of clustered microcalcifications at the 3 o'clock position of the left breast, biopsy was recommended. This was completed on 1/14/22 with pathology revealing invasive ductal carcinoma, low grade, ER 98%/OK 80%, HER2 negative. MRI of the bilateral  breasts was completed on 1/18/22 demonstrating the left 3:00 breast cancer measuring up to 2.1 cm with no additional suspicious findings. She was referred to Dr. Nydia Thakur on 1/27/22, she was referred to genetics but ultimately opted to forgo testing and decided on bilateral mastectomy. Path reviewed, this showed 18 mm of tumor in the left breast with 1 of 3 sentinel nodes positive for carcinoma. Right breast was benign. Despite the unexpected positive sentinel node, she still has [id number] disease  with only 1 node involved, so she would be appropriate for Oncotype Dx analysis for risk stratification. This has returned in the low risk range at 10, because she is over 50 she is squarely within a category of patients who did not benefit from adjuvant chemotherapy. Therefore, we will recommend antiestrogen therapy alone. She is surgically post-menopausal, so a prescription was given for anastrozole. Despite the bilateral mastectomy, I would like her to meet with radiation oncology to discuss adjuvant XRT given the positive node. She is here today for routine 6 month follow up. Since last seen she has stopped Femara due to intolerable side effects.   These include fatigue, arthralgias, hot

## 2023-09-07 ENCOUNTER — TELEMEDICINE (OUTPATIENT)
Dept: RHEUMATOLOGY | Age: 53
End: 2023-09-07
Payer: COMMERCIAL

## 2023-09-07 DIAGNOSIS — M79.10 MYALGIA, UNSPECIFIED SITE: ICD-10-CM

## 2023-09-07 DIAGNOSIS — R74.8 ABNORMAL LEVELS OF OTHER SERUM ENZYMES: ICD-10-CM

## 2023-09-07 DIAGNOSIS — Z79.52 LONG TERM (CURRENT) USE OF SYSTEMIC STEROIDS: ICD-10-CM

## 2023-09-07 DIAGNOSIS — Z79.899 HIGH RISK MEDICATION USE: ICD-10-CM

## 2023-09-07 DIAGNOSIS — R53.83 OTHER FATIGUE: ICD-10-CM

## 2023-09-07 DIAGNOSIS — Z17.0 MALIGNANT NEOPLASM OF BREAST IN FEMALE, ESTROGEN RECEPTOR POSITIVE, UNSPECIFIED LATERALITY, UNSPECIFIED SITE OF BREAST (HCC): ICD-10-CM

## 2023-09-07 DIAGNOSIS — M06.4 INFLAMMATORY POLYARTHRITIS (HCC): Primary | ICD-10-CM

## 2023-09-07 DIAGNOSIS — Z79.899 LONG TERM CURRENT USE OF IMMUNOSUPPRESSIVE DRUG: ICD-10-CM

## 2023-09-07 DIAGNOSIS — E55.9 HYPOVITAMINOSIS D: ICD-10-CM

## 2023-09-07 DIAGNOSIS — M15.9 GENERALIZED OSTEOARTHRITIS: ICD-10-CM

## 2023-09-07 DIAGNOSIS — F32.0 MAJOR DEPRESSIVE DISORDER, SINGLE EPISODE, MILD (HCC): ICD-10-CM

## 2023-09-07 DIAGNOSIS — C50.919 MALIGNANT NEOPLASM OF BREAST IN FEMALE, ESTROGEN RECEPTOR POSITIVE, UNSPECIFIED LATERALITY, UNSPECIFIED SITE OF BREAST (HCC): ICD-10-CM

## 2023-09-07 PROCEDURE — 99215 OFFICE O/P EST HI 40 MIN: CPT | Performed by: INTERNAL MEDICINE

## 2023-09-07 RX ORDER — DESVENLAFAXINE SUCCINATE 50 MG/1
50 TABLET, EXTENDED RELEASE ORAL DAILY
Qty: 90 TABLET | Refills: 0 | Status: SHIPPED | OUTPATIENT
Start: 2023-09-07

## 2023-09-07 RX ORDER — FOLIC ACID 1 MG/1
1 TABLET ORAL DAILY
Qty: 90 TABLET | Refills: 0 | Status: SHIPPED | OUTPATIENT
Start: 2023-09-07

## 2023-09-07 RX ORDER — ERGOCALCIFEROL 1.25 MG/1
50000 CAPSULE ORAL WEEKLY
Qty: 12 CAPSULE | Refills: 1 | Status: SHIPPED | OUTPATIENT
Start: 2023-09-07

## 2023-09-07 RX ORDER — PREDNISONE 5 MG/1
TABLET ORAL
Qty: 100 TABLET | Refills: 0 | Status: SHIPPED | OUTPATIENT
Start: 2023-09-07

## 2023-09-07 NOTE — PATIENT INSTRUCTIONS
1.  Vit d 50182 units once weekly for now   2. Resume methotrexate 4 pills once weekly which is 10 mg with daily folic acid please remember to take with food  3.  pristiq 50mg daily   4. Prednisone 5 mg a day as needed  5.  tylenol 1000mg every am and pm - scheduled  6. vitamin B-12 1000 mcg daily  7. Flu shot due now  8.   For anxiety can take 0.5 mg Ativan x1 dose (patient already has at home )  9. return to clinic in 6-8 weeks call with any issues with CBC and CMP

## 2023-09-07 NOTE — PROGRESS NOTES
about 10 mins. No significant joint swelling. Symptoms worse with use better with rest.  Massage helps. Used to work out, had to stop bc of severe cramps from working out. A lot of movement makes it worse- joyce moving heavy furniture. Even doing normal household chores cause pain. This causes a spiral where she then gets upset, and sad cause she hates not being able to do these normal things again. No injury that she knows that started any of this. pcp did mri of tailbone area, in 2017- hasnt had anything since. No family hisotry of rheum. 2 siblings- brother, sister- no rheum. 1 kid - killed in car accident. No miscarrigaes. Other kid is healthy. Since last visit   Taking Chas 300mg qd as needed and Pristiq 50mg qd. Pt hasnt taken Vit D in 1 month-no reill. Pt had B/L breast reconstruction and fat transfer 23- no issues. Worst joint is hips and feet, especially mornings, no joint swelling. Labs in epic. ROS:     Musculoskeletal ROS:   .    Abnormal:  joint pain  . AM stiffness (hours): 10 min  . Pain scale (0-10): 5  . Fatigue scale (0-10): 7  .    Job status: working   . Activities of daily living: no difficulty    positive as above with the addition of   Headaches   Otherwise negative for:  Fevers, chills, sweats. Weight  stable  No scalp tenderness. No jaw claudication. No acute visual changes. No red or dry eyes. No auditory complaints. No nasal discharge or rhinorrhea or dry mouth. No oral lesions or ulcers. No sore throat. No tongue pain. No cough. No shortness of breath, dyspnea on exertion or wheezing. No hemoptysis. No chest pains or palpitations. No lightheadedness. No pedal edema. No GERD symptoms, abdominal pain,diarrhea or constipation. No increased urinary frequency, nocturia, dysuria or changes in urine color. No alopecia, skin rashes/lesions. No changes in skin tightness. No Raynaud's. Photosensitivity.         Current Outpatient Medications:

## 2023-10-01 ASSESSMENT — PATIENT HEALTH QUESTIONNAIRE - PHQ9
SUM OF ALL RESPONSES TO PHQ9 QUESTIONS 1 & 2: 5
5. POOR APPETITE OR OVEREATING: SEVERAL DAYS
4. FEELING TIRED OR HAVING LITTLE ENERGY: 3
SUM OF ALL RESPONSES TO PHQ QUESTIONS 1-9: 18
5. POOR APPETITE OR OVEREATING: 1
2. FEELING DOWN, DEPRESSED OR HOPELESS: 2
1. LITTLE INTEREST OR PLEASURE IN DOING THINGS: 3
SUM OF ALL RESPONSES TO PHQ QUESTIONS 1-9: 18
6. FEELING BAD ABOUT YOURSELF - OR THAT YOU ARE A FAILURE OR HAVE LET YOURSELF OR YOUR FAMILY DOWN: NEARLY EVERY DAY
10. IF YOU CHECKED OFF ANY PROBLEMS, HOW DIFFICULT HAVE THESE PROBLEMS MADE IT FOR YOU TO DO YOUR WORK, TAKE CARE OF THINGS AT HOME, OR GET ALONG WITH OTHER PEOPLE: NOT DIFFICULT AT ALL
1. LITTLE INTEREST OR PLEASURE IN DOING THINGS: NEARLY EVERY DAY
6. FEELING BAD ABOUT YOURSELF - OR THAT YOU ARE A FAILURE OR HAVE LET YOURSELF OR YOUR FAMILY DOWN: 3
7. TROUBLE CONCENTRATING ON THINGS, SUCH AS READING THE NEWSPAPER OR WATCHING TELEVISION: NEARLY EVERY DAY
9. THOUGHTS THAT YOU WOULD BE BETTER OFF DEAD, OR OF HURTING YOURSELF: NOT AT ALL
10. IF YOU CHECKED OFF ANY PROBLEMS, HOW DIFFICULT HAVE THESE PROBLEMS MADE IT FOR YOU TO DO YOUR WORK, TAKE CARE OF THINGS AT HOME, OR GET ALONG WITH OTHER PEOPLE: 0
2. FEELING DOWN, DEPRESSED OR HOPELESS: MORE THAN HALF THE DAYS
4. FEELING TIRED OR HAVING LITTLE ENERGY: NEARLY EVERY DAY
8. MOVING OR SPEAKING SO SLOWLY THAT OTHER PEOPLE COULD HAVE NOTICED. OR THE OPPOSITE, BEING SO FIGETY OR RESTLESS THAT YOU HAVE BEEN MOVING AROUND A LOT MORE THAN USUAL: 0
SUM OF ALL RESPONSES TO PHQ QUESTIONS 1-9: 18
SUM OF ALL RESPONSES TO PHQ QUESTIONS 1-9: 18
8. MOVING OR SPEAKING SO SLOWLY THAT OTHER PEOPLE COULD HAVE NOTICED. OR THE OPPOSITE - BEING SO FIDGETY OR RESTLESS THAT YOU HAVE BEEN MOVING AROUND A LOT MORE THAN USUAL: NOT AT ALL
3. TROUBLE FALLING OR STAYING ASLEEP: 3
9. THOUGHTS THAT YOU WOULD BE BETTER OFF DEAD, OR OF HURTING YOURSELF: 0
3. TROUBLE FALLING OR STAYING ASLEEP: NEARLY EVERY DAY
SUM OF ALL RESPONSES TO PHQ QUESTIONS 1-9: 18
7. TROUBLE CONCENTRATING ON THINGS, SUCH AS READING THE NEWSPAPER OR WATCHING TELEVISION: 3

## 2023-10-04 ENCOUNTER — OFFICE VISIT (OUTPATIENT)
Dept: INTERNAL MEDICINE CLINIC | Facility: CLINIC | Age: 53
End: 2023-10-04
Payer: COMMERCIAL

## 2023-10-04 VITALS
OXYGEN SATURATION: 96 % | WEIGHT: 190 LBS | TEMPERATURE: 97.9 F | HEIGHT: 63 IN | HEART RATE: 68 BPM | BODY MASS INDEX: 33.66 KG/M2 | SYSTOLIC BLOOD PRESSURE: 134 MMHG | DIASTOLIC BLOOD PRESSURE: 71 MMHG

## 2023-10-04 DIAGNOSIS — N60.99 ATYPICAL DUCTAL HYPERPLASIA OF BREAST: ICD-10-CM

## 2023-10-04 DIAGNOSIS — F32.0 MAJOR DEPRESSIVE DISORDER, SINGLE EPISODE, MILD (HCC): ICD-10-CM

## 2023-10-04 DIAGNOSIS — I10 ESSENTIAL HYPERTENSION: ICD-10-CM

## 2023-10-04 DIAGNOSIS — K82.8 BILIARY DYSKINESIA: ICD-10-CM

## 2023-10-04 DIAGNOSIS — K21.9 GASTROESOPHAGEAL REFLUX DISEASE, UNSPECIFIED WHETHER ESOPHAGITIS PRESENT: ICD-10-CM

## 2023-10-04 DIAGNOSIS — R73.9 HYPERGLYCEMIA: Primary | ICD-10-CM

## 2023-10-04 LAB
ANION GAP SERPL CALC-SCNC: 6 MMOL/L (ref 2–11)
BUN SERPL-MCNC: 17 MG/DL (ref 6–23)
CALCIUM SERPL-MCNC: 10 MG/DL (ref 8.3–10.4)
CHLORIDE SERPL-SCNC: 109 MMOL/L (ref 101–110)
CO2 SERPL-SCNC: 28 MMOL/L (ref 21–32)
CREAT SERPL-MCNC: 0.9 MG/DL (ref 0.6–1)
EST. AVERAGE GLUCOSE BLD GHB EST-MCNC: 128 MG/DL
GLUCOSE SERPL-MCNC: 132 MG/DL (ref 65–100)
HBA1C MFR BLD: 6.1 % (ref 4.8–5.6)
POTASSIUM SERPL-SCNC: 4.3 MMOL/L (ref 3.5–5.1)
SODIUM SERPL-SCNC: 143 MMOL/L (ref 133–143)

## 2023-10-04 PROCEDURE — 3075F SYST BP GE 130 - 139MM HG: CPT | Performed by: INTERNAL MEDICINE

## 2023-10-04 PROCEDURE — 99214 OFFICE O/P EST MOD 30 MIN: CPT | Performed by: INTERNAL MEDICINE

## 2023-10-04 PROCEDURE — 3078F DIAST BP <80 MM HG: CPT | Performed by: INTERNAL MEDICINE

## 2023-10-04 RX ORDER — DESVENLAFAXINE SUCCINATE 50 MG/1
50 TABLET, EXTENDED RELEASE ORAL DAILY
COMMUNITY
End: 2023-10-04

## 2023-10-04 RX ORDER — DESVENLAFAXINE SUCCINATE 50 MG/1
50 TABLET, EXTENDED RELEASE ORAL DAILY
Qty: 90 TABLET | Refills: 1 | Status: SHIPPED | OUTPATIENT
Start: 2023-10-04

## 2023-10-04 RX ORDER — LOSARTAN POTASSIUM AND HYDROCHLOROTHIAZIDE 12.5; 5 MG/1; MG/1
1 TABLET ORAL DAILY
Qty: 90 TABLET | Refills: 1 | Status: SHIPPED | OUTPATIENT
Start: 2023-10-04

## 2023-10-04 ASSESSMENT — ENCOUNTER SYMPTOMS
SHORTNESS OF BREATH: 0
ABDOMINAL PAIN: 1

## 2023-10-05 ENCOUNTER — PATIENT MESSAGE (OUTPATIENT)
Dept: INTERNAL MEDICINE CLINIC | Facility: CLINIC | Age: 53
End: 2023-10-05

## 2023-10-09 NOTE — TELEPHONE ENCOUNTER
Vidal  10/9/2023 3:38 PM EDT      ----- Message -----  From: Sanju Iris  Sent: 10/9/2023 3:00 PM EDT  To: [facility] [id number] [address] Clinical Staff  Subject: Results     I reached out to [facility]. The gentleman stated you would have to send in pre-authorization for [facility] JENNIFER. Rommel Adas is in short supply and is usually prescribed for type 2 diabetes. Said that ozempic may get denied but you could try the [facility]. We will see I guess. Thank you!

## 2023-10-31 ENCOUNTER — TELEMEDICINE (OUTPATIENT)
Dept: RHEUMATOLOGY | Age: 53
End: 2023-10-31
Payer: COMMERCIAL

## 2023-10-31 DIAGNOSIS — Z79.52 LONG TERM (CURRENT) USE OF SYSTEMIC STEROIDS: ICD-10-CM

## 2023-10-31 DIAGNOSIS — M15.9 GENERALIZED OSTEOARTHRITIS: ICD-10-CM

## 2023-10-31 DIAGNOSIS — C50.919 MALIGNANT NEOPLASM OF BREAST IN FEMALE, ESTROGEN RECEPTOR POSITIVE, UNSPECIFIED LATERALITY, UNSPECIFIED SITE OF BREAST (HCC): ICD-10-CM

## 2023-10-31 DIAGNOSIS — Z71.85 VACCINE COUNSELING: ICD-10-CM

## 2023-10-31 DIAGNOSIS — F32.0 MAJOR DEPRESSIVE DISORDER, SINGLE EPISODE, MILD (HCC): ICD-10-CM

## 2023-10-31 DIAGNOSIS — M06.4 INFLAMMATORY POLYARTHRITIS (HCC): Primary | ICD-10-CM

## 2023-10-31 DIAGNOSIS — Z17.0 MALIGNANT NEOPLASM OF BREAST IN FEMALE, ESTROGEN RECEPTOR POSITIVE, UNSPECIFIED LATERALITY, UNSPECIFIED SITE OF BREAST (HCC): ICD-10-CM

## 2023-10-31 DIAGNOSIS — M79.10 MYALGIA, UNSPECIFIED SITE: ICD-10-CM

## 2023-10-31 DIAGNOSIS — E55.9 HYPOVITAMINOSIS D: ICD-10-CM

## 2023-10-31 DIAGNOSIS — Z79.899 LONG TERM CURRENT USE OF IMMUNOSUPPRESSIVE DRUG: ICD-10-CM

## 2023-10-31 DIAGNOSIS — Z79.899 HIGH RISK MEDICATION USE: ICD-10-CM

## 2023-10-31 DIAGNOSIS — R53.83 OTHER FATIGUE: ICD-10-CM

## 2023-10-31 DIAGNOSIS — G62.9 NEUROPATHY: ICD-10-CM

## 2023-10-31 PROCEDURE — 99215 OFFICE O/P EST HI 40 MIN: CPT | Performed by: INTERNAL MEDICINE

## 2023-10-31 RX ORDER — ERGOCALCIFEROL 1.25 MG/1
50000 CAPSULE ORAL WEEKLY
Qty: 12 CAPSULE | Refills: 1 | Status: SHIPPED | OUTPATIENT
Start: 2023-10-31

## 2023-10-31 RX ORDER — TRAMADOL HYDROCHLORIDE 50 MG/1
50 TABLET ORAL EVERY 8 HOURS PRN
Qty: 90 TABLET | Refills: 1 | Status: SHIPPED | OUTPATIENT
Start: 2023-10-31 | End: 2024-01-29

## 2023-10-31 RX ORDER — PREDNISONE 5 MG/1
TABLET ORAL
Qty: 100 TABLET | Refills: 0 | Status: SHIPPED | OUTPATIENT
Start: 2023-10-31

## 2023-10-31 NOTE — PROGRESS NOTES
below        Total visit time 43 minutes, greater than half of the time was spent on counseling. Plan:       1. Vit d 11722 units once weekly for now   2. Recommend starting methotrexate 4 pills once weekly which is 10 mg with daily folic acid please remember to take with food  3.  pristiq 50mg daily   4. Prednisone 5 mg a day as needed  5.  tylenol 1000mg every am and pm - scheduled  6. Tramadol 50 mg up to every 8 hours as needed number of pills 90  7. Flu shot due now  8. RTC in 2 to 3 months with labs CBC and CMP and vitamin D call if any issues      CPT Codes 32854-51695 for Established Patients may apply to this Telehealth Visit  Total visit time 43 minutes , greater than half of the time was spent on counseling. We discussed the expected course, resolution and complications of the diagnosis(es) in detail. Medication risks, benefits, costs, interactions, and alternatives were discussed as indicated. I advised her to contact the office if her condition worsens, changes or fails to improve as anticipated. She expressed understanding with the diagnosis(es) and plan. Pursuant to the emergency declaration under the [facility] waiver authority and the [employer] General Act, this Virtual  Visit was conducted, with patient's consent, to reduce the patient's risk of exposure to COVID-19 and provide continuity of care for an established patient. Services were provided through a video synchronous discussion virtually to substitute for in-person clinic visit.     Beena Reza MD

## 2023-10-31 NOTE — PATIENT INSTRUCTIONS
1.  Vit d 98952 units once weekly for now   2. Recommend starting methotrexate 4 pills once weekly which is 10 mg with daily folic acid please remember to take with food  3.  pristiq 50mg daily   4. Prednisone 5 mg a day as needed  5.  tylenol 1000mg every am and pm - scheduled  6. Tramadol 50 mg up to every 8 hours as needed number of pills 90  7. Flu shot due now  8.   RTC in 2 to 3 months with labs CBC and CMP and vitamin D call if any issues    3 months,Labs prior [facility]

## 2024-01-23 ENCOUNTER — TELEPHONE (OUTPATIENT)
Dept: RADIATION ONCOLOGY | Age: 54
End: 2024-01-23

## 2024-01-23 DIAGNOSIS — C50.912 MALIGNANT NEOPLASM OF LEFT BREAST IN FEMALE, ESTROGEN RECEPTOR POSITIVE, UNSPECIFIED SITE OF BREAST (HCC): Primary | ICD-10-CM

## 2024-01-23 DIAGNOSIS — Z17.0 MALIGNANT NEOPLASM OF LEFT BREAST IN FEMALE, ESTROGEN RECEPTOR POSITIVE, UNSPECIFIED SITE OF BREAST (HCC): Primary | ICD-10-CM

## 2024-01-30 NOTE — PROGRESS NOTES
[facility] Hematology and Oncology: Office Visit Established Patient    Chief Complaint:    Chief Complaint   Patient presents with    Follow-up     History of Present Illness:  Ms. Mcintosh is a 53 y.o. female who returns today for management of breast cancer.  She initially presented for a routine bilateral screening mammogram on 1/8/22 which identified left breast calcifications. Further evaluation with left breast diagnostic mammogram on 1/13/22 confirmed the  presence of clustered microcalcifications at the 3 o'clock position of the left breast, biopsy was recommended.  This was completed on 1/14/22 with pathology revealing invasive ductal carcinoma, low grade, ER 98%/AL 80%, HER2 negative.  MRI of the bilateral  breasts was completed on 1/18/22 demonstrating the left 3:00 breast cancer measuring up to 2.1 cm with no additional suspicious findings.  She was referred to Dr. Keita on 1/27/22, she was referred to genetics but ultimately opted to forgo testing and decided on bilateral mastectomy.  Path reviewed, this showed 18 mm of tumor in the left breast with 1 of 3 sentinel nodes positive for carcinoma.  Right breast was benign.  Despite the unexpected positive sentinel node, she still has [id number] disease  with only 1 node involved, so she would be appropriate for Oncotype Dx analysis for risk stratification.  This has returned in the low risk range at 10, because she is over 50 she is squarely within a category of patients who did not benefit from adjuvant chemotherapy.  Therefore, we will recommend antiestrogen therapy alone.  She is surgically post-menopausal, so a prescription was given for anastrozole.  Despite the bilateral mastectomy, I would like her to meet with radiation oncology to discuss adjuvant XRT given the positive node.     She is here today for routine 6 month follow up, remains off of AI d/t previously intolerable side effects.  She has been well overall since last seen.  She denies any

## 2024-01-31 ENCOUNTER — OFFICE VISIT (OUTPATIENT)
Dept: ONCOLOGY | Age: 54
End: 2024-01-31
Payer: COMMERCIAL

## 2024-01-31 ENCOUNTER — OFFICE VISIT (OUTPATIENT)
Dept: RHEUMATOLOGY | Age: 54
End: 2024-01-31
Payer: COMMERCIAL

## 2024-01-31 VITALS
BODY MASS INDEX: 33.31 KG/M2 | DIASTOLIC BLOOD PRESSURE: 78 MMHG | OXYGEN SATURATION: 97 % | SYSTOLIC BLOOD PRESSURE: 125 MMHG | TEMPERATURE: 98.4 F | HEIGHT: 63 IN | HEART RATE: 69 BPM | WEIGHT: 188 LBS | RESPIRATION RATE: 18 BRPM

## 2024-01-31 VITALS
HEIGHT: 63 IN | SYSTOLIC BLOOD PRESSURE: 130 MMHG | HEART RATE: 78 BPM | WEIGHT: 189 LBS | DIASTOLIC BLOOD PRESSURE: 86 MMHG | BODY MASS INDEX: 33.49 KG/M2

## 2024-01-31 DIAGNOSIS — Z17.0 MALIGNANT NEOPLASM OF BREAST IN FEMALE, ESTROGEN RECEPTOR POSITIVE, UNSPECIFIED LATERALITY, UNSPECIFIED SITE OF BREAST (HCC): ICD-10-CM

## 2024-01-31 DIAGNOSIS — R53.83 OTHER FATIGUE: ICD-10-CM

## 2024-01-31 DIAGNOSIS — Z17.0 MALIGNANT NEOPLASM OF LEFT BREAST IN FEMALE, ESTROGEN RECEPTOR POSITIVE, UNSPECIFIED SITE OF BREAST (HCC): Primary | ICD-10-CM

## 2024-01-31 DIAGNOSIS — M79.10 MYALGIA, UNSPECIFIED SITE: ICD-10-CM

## 2024-01-31 DIAGNOSIS — R53.83 FATIGUE, UNSPECIFIED TYPE: ICD-10-CM

## 2024-01-31 DIAGNOSIS — C50.912 MALIGNANT NEOPLASM OF LEFT BREAST IN FEMALE, ESTROGEN RECEPTOR POSITIVE, UNSPECIFIED SITE OF BREAST (HCC): Primary | ICD-10-CM

## 2024-01-31 DIAGNOSIS — C50.919 MALIGNANT NEOPLASM OF BREAST IN FEMALE, ESTROGEN RECEPTOR POSITIVE, UNSPECIFIED LATERALITY, UNSPECIFIED SITE OF BREAST (HCC): ICD-10-CM

## 2024-01-31 DIAGNOSIS — G62.9 NEUROPATHY: ICD-10-CM

## 2024-01-31 DIAGNOSIS — M15.9 GENERALIZED OSTEOARTHRITIS: ICD-10-CM

## 2024-01-31 DIAGNOSIS — Z79.899 HIGH RISK MEDICATION USE: ICD-10-CM

## 2024-01-31 DIAGNOSIS — M06.4 INFLAMMATORY POLYARTHRITIS (HCC): Primary | ICD-10-CM

## 2024-01-31 DIAGNOSIS — E55.9 HYPOVITAMINOSIS D: ICD-10-CM

## 2024-01-31 PROCEDURE — 99214 OFFICE O/P EST MOD 30 MIN: CPT | Performed by: NURSE PRACTITIONER

## 2024-01-31 PROCEDURE — 3079F DIAST BP 80-89 MM HG: CPT | Performed by: INTERNAL MEDICINE

## 2024-01-31 PROCEDURE — 3078F DIAST BP <80 MM HG: CPT | Performed by: NURSE PRACTITIONER

## 2024-01-31 PROCEDURE — 3074F SYST BP LT 130 MM HG: CPT | Performed by: NURSE PRACTITIONER

## 2024-01-31 PROCEDURE — 3075F SYST BP GE 130 - 139MM HG: CPT | Performed by: INTERNAL MEDICINE

## 2024-01-31 PROCEDURE — 99215 OFFICE O/P EST HI 40 MIN: CPT | Performed by: INTERNAL MEDICINE

## 2024-01-31 RX ORDER — TRAMADOL HYDROCHLORIDE 50 MG/1
50 TABLET ORAL EVERY 8 HOURS PRN
Qty: 90 TABLET | Refills: 3 | Status: SHIPPED | OUTPATIENT
Start: 2024-01-31 | End: 2024-05-30

## 2024-01-31 RX ORDER — DESVENLAFAXINE SUCCINATE 50 MG/1
50 TABLET, EXTENDED RELEASE ORAL DAILY
Qty: 90 TABLET | Refills: 1 | Status: SHIPPED | OUTPATIENT
Start: 2024-01-31

## 2024-01-31 RX ORDER — PREDNISONE 5 MG/1
TABLET ORAL
Qty: 100 TABLET | Refills: 1 | Status: SHIPPED | OUTPATIENT
Start: 2024-01-31

## 2024-01-31 NOTE — PATIENT INSTRUCTIONS
1.  Vit d 2000 units daily  2.  Tramadol 50 mg up to every 8 hours as needed number of pills 90  3.  pristiq 50mg daily   4.  Prednisone 5 mg a day as needed -currently minimal use, call if you are having to take this regularly or increasing symptoms  5.  tylenol 1000mg every am and pm - scheduled  6.  Referral to GI, 53-year-old lady with history of breast cancer status postresection and inflammatory polyarthritis currently not on treatment with elevated LFTs  7.  RTC in 4 months with labs vitamin D call if any issues      4month,Labs prior Alen carballo pt, GI-[facility]

## 2024-01-31 NOTE — PROGRESS NOTES
Subjective:      HPI:          Permanent history      Mrs. Mcintosh is a 53-year-old  lady with past medical history significant for hypertension, GERD, obesity, depression, sacral pain and chronic myalgias and arthralgias who presents for evaluation.    Patient reports longstanding muscular pain. Has constant body ache, radaites down shoulders, across shoulderblades, down into biceps, radiates down hips. Tried cymbatla, tail bone pain. Tried lyrica- still no relief. Always in pain. Able to do normal activites, but doing an all day things, she is in misery. Worst pain in back, shulders and hips.  Tailbone and hip pain started in 2017. cymbalta or lisa did not help with the pain. Worst pain on rt side. Will have husbnad push in shoulder, feels like their are knots in her shoulder, but this causes pain. Now she takes pristiq and lyrica.  But cannot take lyrica, makes her very sleepy, unale to work. Never tried gabapentin. Takes lyrica in pm. Takes pristiq in am. Switched from cymbalta to pristiq bc she was feeling depression.  Taken pristiq for about a few months- has missed some dose, worse when she doesn't take it. When missed lrica, doesn't notice a difference in pain. Tried to get off pristiq bc she doesn't want to take meds, no side effects. Tried ibuprofen, didn't help- doesn't take it- has prescription for it. Hasn't tried tylenol. Pain in shoulders, back, down to tailbone. Feet will hurt first thing in the morning, but walking around it goes away- takes about 10 mins.  No significant joint swelling.  Symptoms worse with use better with rest.  Massage helps. Used to work out, had to stop bc of severe cramps from working out. A lot of movement makes it worse- joyce moving heavy furniture. Even doing normal household chores cause pain. This causes a spiral where she then gets upset, and sad cause she hates not being able to do these normal things again. No injury that she knows that started

## 2024-02-05 ENCOUNTER — OFFICE VISIT (OUTPATIENT)
Dept: INTERNAL MEDICINE CLINIC | Facility: CLINIC | Age: 54
End: 2024-02-05
Payer: COMMERCIAL

## 2024-02-05 VITALS
TEMPERATURE: 97.9 F | WEIGHT: 190 LBS | HEART RATE: 64 BPM | DIASTOLIC BLOOD PRESSURE: 74 MMHG | HEIGHT: 63 IN | OXYGEN SATURATION: 96 % | BODY MASS INDEX: 33.66 KG/M2 | SYSTOLIC BLOOD PRESSURE: 128 MMHG

## 2024-02-05 DIAGNOSIS — F32.0 MAJOR DEPRESSIVE DISORDER, SINGLE EPISODE, MILD (HCC): ICD-10-CM

## 2024-02-05 DIAGNOSIS — R73.03 PRE-DIABETES: ICD-10-CM

## 2024-02-05 DIAGNOSIS — I10 ESSENTIAL HYPERTENSION: Primary | ICD-10-CM

## 2024-02-05 DIAGNOSIS — R53.83 OTHER FATIGUE: ICD-10-CM

## 2024-02-05 DIAGNOSIS — C77.3 SECONDARY AND UNSPECIFIED MALIGNANT NEOPLASM OF AXILLA AND UPPER LIMB LYMPH NODES (HCC): ICD-10-CM

## 2024-02-05 LAB
TSH, 3RD GENERATION: 2.09 UIU/ML (ref 0.36–3.74)
VIT B12 SERPL-MCNC: 337 PG/ML (ref 193–986)

## 2024-02-05 PROCEDURE — 3074F SYST BP LT 130 MM HG: CPT | Performed by: INTERNAL MEDICINE

## 2024-02-05 PROCEDURE — 3078F DIAST BP <80 MM HG: CPT | Performed by: INTERNAL MEDICINE

## 2024-02-05 PROCEDURE — 99214 OFFICE O/P EST MOD 30 MIN: CPT | Performed by: INTERNAL MEDICINE

## 2024-02-05 RX ORDER — LOSARTAN POTASSIUM AND HYDROCHLOROTHIAZIDE 12.5; 5 MG/1; MG/1
1 TABLET ORAL DAILY
Qty: 90 TABLET | Refills: 1 | Status: SHIPPED | OUTPATIENT
Start: 2024-02-05

## 2024-02-05 ASSESSMENT — ENCOUNTER SYMPTOMS: SHORTNESS OF BREATH: 0

## 2024-02-05 NOTE — PROGRESS NOTES
[facility]  Lake Wasserman M.D.  Internal Medicine  [address] [place] [id number]  Office : [phone]  Fax : [phone]    Chief Complaint   Patient presents with    Hypertension     4 mo follow up    Fatigue       History of Present Illness:  Camille Mcintosh is a 53 y.o. female.  Fatigue  This is a new problem. The current episode started 1 to 4 weeks ago. The problem occurs constantly. The problem has been unchanged. Associated symptoms include arthralgias and fatigue. Pertinent negatives include no chest pain, chills, fever or rash. Nothing aggravates the symptoms. She has tried nothing for the symptoms. The treatment provided no relief.     Lab Results   Component Value Date    TSH 0.950 05/08/2020     Lab Results   Component Value Date    TUZAMLTX85 300 08/02/2023       Hypertension  Patient is in for Hypertension which is stable.    Diet and Lifestyle: generally follows a low sodium diet, exercises sporadically  Home BP Monitoring: is not measured at home. Patient's recent blood pressures were previously   BP Readings from Last 3 Encounters:   02/05/24 128/74   01/31/24 125/78   01/31/24 130/86   .   taking medications as instructed, no medication side effects noted, no TIA's, no chest pain on exertion, no dyspnea on exertion, no swelling of ankles. Cardiac risk factors consist of hypertension.     Lab Results   Component Value Date/Time     10/04/2023 09:35 AM    K 4.3 10/04/2023 09:35 AM     10/04/2023 09:35 AM    CO2 28 10/04/2023 09:35 AM    BUN 17 10/04/2023 09:35 AM    GFRAA >60 08/31/2022 02:32 PM      Pre-Diabetes  Hemoglobin A1C   Date Value Ref Range Status   10/04/2023 6.1 (H) 4.8 - 5.6 % Final       RA  No taking MTX or Folate.  Now on prednisone  Lab Results   Component Value Date    CRP 6 05/08/2020         GERD  Controlled with intermittent use of omprazole    Depression with anxiety  Pristiq decreased and rheumatologist

## 2024-02-07 ENCOUNTER — TELEPHONE (OUTPATIENT)
Dept: RHEUMATOLOGY | Age: 54
End: 2024-02-07

## 2024-02-07 NOTE — TELEPHONE ENCOUNTER
OV 1/31/24, Dx Inflamm Arth, see pt message below, thanks    you mentioned sending me to GI due to liver levels a little high and concern for other issues or fibromyalgia. So I was wondering if Pristiq is the best for fibro? Dr. Leiva previous family Dr had mentioned Stephan at one time. I am going to try to better my diet and exercise a little more with more stretching involved. As always I appreciate you!

## 2024-02-07 NOTE — TELEPHONE ENCOUNTER
Replied thru My Chart    Hi, per DR Mahan,   They both work very similarly she can try Savella if she wishes thanks      So let me know if you want to try? Thank you

## 2024-02-08 ENCOUNTER — HOSPITAL ENCOUNTER (OUTPATIENT)
Dept: PHYSICAL THERAPY | Age: 54
Setting detail: RECURRING SERIES
Discharge: HOME | End: 2024-02-11
Payer: COMMERCIAL

## 2024-02-08 DIAGNOSIS — M25.612 STIFFNESS OF LEFT SHOULDER, NOT ELSEWHERE CLASSIFIED: Primary | ICD-10-CM

## 2024-02-08 DIAGNOSIS — N64.4 MASTODYNIA: ICD-10-CM

## 2024-02-08 PROCEDURE — 97161 PT EVAL LOW COMPLEX 20 MIN: CPT

## 2024-02-08 PROCEDURE — 97110 THERAPEUTIC EXERCISES: CPT

## 2024-02-08 PROCEDURE — 97140 MANUAL THERAPY 1/> REGIONS: CPT

## 2024-02-08 NOTE — THERAPY EVALUATION
Camille Mcintosh  : 1970  Primary: [employer] [place] [employer]  Secondary:  [facility]  [address]  [address]  [facility] [address]  Phone: [phone]  Fax: [phone] Plan Frequency: 1-2x/week for 90 days    Plan of Care/Certification Expiration Date: 24        Plan of Care/Certification Expiration Date:  Plan of Care/Certification Expiration Date: 24    Frequency/Duration: Plan Frequency: 1-2x/week for 90 days      Time In/Out:   Time In: 1302  Time Out: 0355      PT Visit Info:         Visit Count:  1                OUTPATIENT PHYSICAL THERAPY:             Initial Assessment 2024               Episode (oncology rehab)         Treatment Diagnosis:     Stiffness of left shoulder, not elsewhere classified  Mastodynia  Medical/Referring Diagnosis:    Malignant neoplasm of unspecified site of left female breast [C50.912]  Estrogen receptor positive status (ER+) [Z17.0]      Referring Physician:  Tameka Grayson APRN - NP MD Orders:  PT Eval and Treat oncology rehab  Return MD Appt:  24  Date of Onset:  Onset Date: 22     Allergies:  Patient has no known allergies.  Restrictions/Precautions:    None      Medications Last Reviewed:  2024     SUBJECTIVE   History of Injury/Illness (Reason for Referral):  The patient presents following diagnosis and treatment of left breast cancer.  She underwent biopsy 22.  She underwent bilateral mastectomy with SNB(1+/3) with expander placement on 22.  She was placed on endocrine therapy.  She completed radiation .  She had fat grafting and soft tissue contouring 23.    Patient Stated Goal(s):  \"none stated\"  Initial Pain Level:      6/10   Post Session Pain Level:     6/10  Past Medical History/Comorbidities:   Ms. Mcintosh  has a past medical history of Agatston coronary artery calcium score less than 100, Cancer (HCC), Depression with anxiety, Essential hypertension, Family history of breast cancer,

## 2024-02-08 NOTE — PROGRESS NOTES
Activity/Exercise Parameters Parameters Parameters   Trunk rotation X 5     Thoracic open book X 5     Chest stretch X 5     Corner stretch X 5                       The patient received soft tissue mobilization of the left breast and axilla region in supine and side lying.  She received a left arm pull in 2 directions followed by a cross hand release to the left inner arm.  She was instructed in the above exercise.  She was given a written program.  She will perform them 2-3 times daily and gradually increase to 10 reps.  She was advised to massage the lateral breast while in side lying once daily.    Access Code: [email or web address]  URL: [email or web address]  Date: 02/08/2024  Prepared by: Joyce Heredia    Exercises  - Supine Lower Trunk Rotation  - 2-3 x daily - 7 x weekly - 1 sets - 10 reps - 5 hold  - Sidelying Thoracic Rotation with Open Book  - 2-3 x daily - 7 x weekly - 1 sets - 10 reps - 5 hold  - Supine Chest Stretch with Elbows Bent  - 2-3 x daily - 7 x weekly - 1 sets - 10 reps - 5 hold  - Corner Pec Major Stretch  - 2-3 x daily - 7 x weekly - 1 sets - 10 reps - 5 hold  Treatment/Session Summary:    Treatment Assessment:   The patient had a reduction in tenderness along with an increase in tissue pliability.  Communication/Consultation:   perform self breast mobilization and also HEP  Equipment provided today:  HEP  Recommendations/Intent for next treatment session: Next visit will focus on ROM and soft tissue mobilization.    >Total Treatment Billable Duration:  53 minutes   Time In: 1302  Time Out: 9119    JOYCE HEREDIA PT         Charge Capture  [employer] Portal  Appt Desk     Future Appointments   Date Time Provider Department Center   2/15/2024 11:00 AM Joyce Heredia, PT [facility]   2/22/2024  9:00 AM Joyce Heredia, PT [facility]   2/29/2024  9:00 AM Joyce Heredia, PT [facility]   3/7/2024  9:00 AM Joyce Heredia, PT [facility]   6/4/2024 10:30 AM Gege Mahan MD [facility]

## 2024-02-15 ENCOUNTER — HOSPITAL ENCOUNTER (OUTPATIENT)
Dept: PHYSICAL THERAPY | Age: 54
Setting detail: RECURRING SERIES
Discharge: HOME | End: 2024-02-18
Payer: COMMERCIAL

## 2024-02-15 PROCEDURE — 97110 THERAPEUTIC EXERCISES: CPT

## 2024-02-15 PROCEDURE — 97140 MANUAL THERAPY 1/> REGIONS: CPT

## 2024-02-15 ASSESSMENT — PAIN SCALES - GENERAL: PAINLEVEL_OUTOF10: 6

## 2024-02-15 NOTE — PROGRESS NOTES
motion and restricted motion of soft tissue.    Date:  2/8/24 Date:  2/15/24 Date:     Activity/Exercise Parameters Parameters Parameters   Trunk rotation X 5 X 10    Thoracic open book X 5 X 10    Chest stretch X 5 X 10    Corner stretch X 5 X 10    Sword of hope  X 10    Wand external rotation  X 10    Scapular retraction  X 3    The patient received soft tissue mobilization of the left breast and axilla region in supine and side lying.  She received a left arm pull.  She was instructed in wand external rotation and sword of hope along with scapular retraction.  She was given a written program.  She will perform them 2-3 times daily. She was advised to massage the lateral breast while in side lying once daily.    Access Code: [email or web address]  URL: [email or web address]  Date: 02/08/2024  Prepared by: Joyce Morrison    Exercises  - Supine Lower Trunk Rotation  - 2-3 x daily - 7 x weekly - 1 sets - 10 reps - 5 hold  - Sidelying Thoracic Rotation with Open Book  - 2-3 x daily - 7 x weekly - 1 sets - 10 reps - 5 hold  - Supine Chest Stretch with Elbows Bent  - 2-3 x daily - 7 x weekly - 1 sets - 10 reps - 5 hold  - Corner Pec Major Stretch  - 2-3 x daily - 7 x weekly - 1 sets - 10 reps - 5 holdAccess Code: [email or web address]  URL: [email or web address]  Date: 02/15/2024  Prepared by: Joyce Morrison    Exercises  - Supine PNF D2  - 2 x daily - 7 x weekly - 1 sets - 10 reps - 5 hold  - Supine Shoulder External Rotation with Dowel  - 2 x daily - 7 x weekly - 1 sets - 10 reps - 5 hold    Treatment/Session Summary:    Treatment Assessment:   The patient has not been as compliant as she should.  She will need to be diligent in order to progress.  We will increase to 2x/week.  Communication/Consultation:   perform self breast mobilization and also HEP  Equipment provided today:  HEP  Recommendations/Intent for next treatment session: Next visit will focus on ROM and soft tissue mobilization.    >Total Treatment Billable

## 2024-02-20 ENCOUNTER — HOSPITAL ENCOUNTER (OUTPATIENT)
Dept: PHYSICAL THERAPY | Age: 54
Setting detail: RECURRING SERIES
Discharge: HOME | End: 2024-02-23
Payer: COMMERCIAL

## 2024-02-20 PROCEDURE — 97140 MANUAL THERAPY 1/> REGIONS: CPT

## 2024-02-20 PROCEDURE — 97110 THERAPEUTIC EXERCISES: CPT

## 2024-02-20 ASSESSMENT — PAIN SCALES - GENERAL: PAINLEVEL_OUTOF10: 6

## 2024-02-20 NOTE — PROGRESS NOTES
Date:  2/20/24   Activity/Exercise Parameters Parameters Parameters   Trunk rotation X 5 X 10 X 10   Thoracic open book X 5 X 10 X 10   Chest stretch X 5 X 10 X 10   Corner stretch X 5 X 10 X 10   Sword of hope  X 10 X 10   Wand external rotation  X 10 X 10   Scapular retraction  X 3 X 10   Wand flexion   X 10   The patient received soft tissue mobilization of the left breast and axilla region in supine.  She performed the above exercises.  We added wand flexion.  She was given a written program.   She will perform them 2-3 times daily. She was given options to acquire a supportive bra.  Shefit was given as an example.  Access Code: [email or web address]  URL: [email or web address]  Date: 02/08/2024  Prepared by: Joyce Morrison    Exercises  - Supine Lower Trunk Rotation  - 2-3 x daily - 7 x weekly - 1 sets - 10 reps - 5 hold  - Sidelying Thoracic Rotation with Open Book  - 2-3 x daily - 7 x weekly - 1 sets - 10 reps - 5 hold  - Supine Chest Stretch with Elbows Bent  - 2-3 x daily - 7 x weekly - 1 sets - 10 reps - 5 hold  - Corner Pec Major Stretch  - 2-3 x daily - 7 x weekly - 1 sets - 10 reps - 5 holdAccess Code: [email or web address]  URL: [email or web address]  Date: 02/15/2024  Prepared by: Joyce Morrison    Exercises  - Supine PNF D2  - 2 x daily - 7 x weekly - 1 sets - 10 reps - 5 hold  - Supine Shoulder External Rotation with Dowel  - 2 x daily - 7 x weekly - 1 sets - 10 reps - 5 hold  Access Code: [email or web address]  URL: [email or web address]  Date: 02/20/2024  Prepared by: Joyce Morrison    Exercises  - Supine Shoulder Flexion Extension AAROM with Dowel  - 2 x daily - 7 x weekly - 1 sets - 10 reps - 5 hold  Treatment/Session Summary:    Treatment Assessment:   The patient has had a reduction in tenderness in the breast.  Tissue is more mobile.  Communication/Consultation:   perform self breast mobilization and also HEP  Equipment provided today:  HEP  Recommendations/Intent for next treatment session: Next

## 2024-02-22 ENCOUNTER — HOSPITAL ENCOUNTER (OUTPATIENT)
Dept: PHYSICAL THERAPY | Age: 54
Setting detail: RECURRING SERIES
End: 2024-02-22
Payer: COMMERCIAL

## 2024-02-22 NOTE — PROGRESS NOTES
Camille Mcintosh  : 1970  Primary: [employer]  Secondary:  [facility]  [address]  [address]  [facility] [address]  Phone: [phone]  Fax: [phone]    PT Visit Info:    No data recorded   OT Visit Info:  No data recorded    OUTPATIENT THERAPY: 2024  Episode  Appt Desk        Camille Mcintosh cancelled her appointment for today due to illness.  Will plan to follow up during next appointment.  Thank you,  JOYCE MORRISON, PT    Future Appointments   Date Time Provider Department Center   2024  9:00 AM Joyce Morrison, PT [facility]   2024  9:00 AM Jocye Morrison, PT [facility]   3/7/2024  9:00 AM Joyce Morrison, PT [facility]   2024 10:30 AM Gege Mahan MD [facility]   2024 10:15 AM Lake Wasserman MD [facility]   2024 12:30 PM [facility]IPHERAL [facility]   2024  1:30 PM Rex Hanson MD [facility]

## 2024-02-29 ENCOUNTER — HOSPITAL ENCOUNTER (OUTPATIENT)
Dept: PHYSICAL THERAPY | Age: 54
Setting detail: RECURRING SERIES
End: 2024-02-29
Payer: COMMERCIAL

## 2024-02-29 NOTE — PROGRESS NOTES
Camille Mcintosh  : 1970  Primary: [employer]  Secondary:  [facility]  [address]  [address]  [facility] [address]  Phone: [phone]  Fax: [phone]    PT Visit Info:    No data recorded   OT Visit Info:  No data recorded    OUTPATIENT THERAPY: 2024  Episode  Appt Desk        Camille Mcintosh did not show for her appointment for today due to unknown reasons.  Will plan to follow up during next appointment.  Thank you,  JOYCE MORRISON, PT    Future Appointments   Date Time Provider Department Center   3/7/2024  9:00 AM Joyce Morrison, PT [facility]   2024 10:30 AM Gege Mahan MD [facility]   2024 10:15 AM [facility]   2024 12:30 PM PERIPHERAL [facility]   2024  1:30 PM Rex Hanson MD [facility]

## 2024-06-02 SDOH — ECONOMIC STABILITY: FOOD INSECURITY: WITHIN THE PAST 12 MONTHS, YOU WORRIED THAT YOUR FOOD WOULD RUN OUT BEFORE YOU GOT MONEY TO BUY MORE.: PATIENT DECLINED

## 2024-06-02 SDOH — ECONOMIC STABILITY: FOOD INSECURITY: WITHIN THE PAST 12 MONTHS, THE FOOD YOU BOUGHT JUST DIDN'T LAST AND YOU DIDN'T HAVE MONEY TO GET MORE.: PATIENT DECLINED

## 2024-06-02 SDOH — ECONOMIC STABILITY: HOUSING INSECURITY
IN THE LAST 12 MONTHS, WAS THERE A TIME WHEN YOU DID NOT HAVE A STEADY PLACE TO SLEEP OR SLEPT IN A SHELTER (INCLUDING NOW)?: PATIENT DECLINED

## 2024-06-02 SDOH — ECONOMIC STABILITY: INCOME INSECURITY: HOW HARD IS IT FOR YOU TO PAY FOR THE VERY BASICS LIKE FOOD, HOUSING, MEDICAL CARE, AND HEATING?: PATIENT DECLINED

## 2024-06-02 SDOH — ECONOMIC STABILITY: TRANSPORTATION INSECURITY
IN THE PAST 12 MONTHS, HAS LACK OF TRANSPORTATION KEPT YOU FROM MEETINGS, WORK, OR FROM GETTING THINGS NEEDED FOR DAILY LIVING?: PATIENT DECLINED

## 2024-06-02 ASSESSMENT — PATIENT HEALTH QUESTIONNAIRE - PHQ9
3. TROUBLE FALLING OR STAYING ASLEEP: SEVERAL DAYS
1. LITTLE INTEREST OR PLEASURE IN DOING THINGS: SEVERAL DAYS
4. FEELING TIRED OR HAVING LITTLE ENERGY: NEARLY EVERY DAY
4. FEELING TIRED OR HAVING LITTLE ENERGY: NEARLY EVERY DAY
9. THOUGHTS THAT YOU WOULD BE BETTER OFF DEAD, OR OF HURTING YOURSELF: NOT AT ALL
SUM OF ALL RESPONSES TO PHQ QUESTIONS 1-9: 13
6. FEELING BAD ABOUT YOURSELF - OR THAT YOU ARE A FAILURE OR HAVE LET YOURSELF OR YOUR FAMILY DOWN: NEARLY EVERY DAY
SUM OF ALL RESPONSES TO PHQ QUESTIONS 1-9: 13
SUM OF ALL RESPONSES TO PHQ QUESTIONS 1-9: 13
5. POOR APPETITE OR OVEREATING: SEVERAL DAYS
2. FEELING DOWN, DEPRESSED OR HOPELESS: SEVERAL DAYS
7. TROUBLE CONCENTRATING ON THINGS, SUCH AS READING THE NEWSPAPER OR WATCHING TELEVISION: NEARLY EVERY DAY
SUM OF ALL RESPONSES TO PHQ9 QUESTIONS 1 & 2: 2
SUM OF ALL RESPONSES TO PHQ QUESTIONS 1-9: 13
9. THOUGHTS THAT YOU WOULD BE BETTER OFF DEAD, OR OF HURTING YOURSELF: NOT AT ALL
8. MOVING OR SPEAKING SO SLOWLY THAT OTHER PEOPLE COULD HAVE NOTICED. OR THE OPPOSITE - BEING SO FIDGETY OR RESTLESS THAT YOU HAVE BEEN MOVING AROUND A LOT MORE THAN USUAL: NOT AT ALL
SUM OF ALL RESPONSES TO PHQ QUESTIONS 1-9: 13
7. TROUBLE CONCENTRATING ON THINGS, SUCH AS READING THE NEWSPAPER OR WATCHING TELEVISION: NEARLY EVERY DAY
8. MOVING OR SPEAKING SO SLOWLY THAT OTHER PEOPLE COULD HAVE NOTICED. OR THE OPPOSITE, BEING SO FIGETY OR RESTLESS THAT YOU HAVE BEEN MOVING AROUND A LOT MORE THAN USUAL: NOT AT ALL
10. IF YOU CHECKED OFF ANY PROBLEMS, HOW DIFFICULT HAVE THESE PROBLEMS MADE IT FOR YOU TO DO YOUR WORK, TAKE CARE OF THINGS AT HOME, OR GET ALONG WITH OTHER PEOPLE: NOT DIFFICULT AT ALL
10. IF YOU CHECKED OFF ANY PROBLEMS, HOW DIFFICULT HAVE THESE PROBLEMS MADE IT FOR YOU TO DO YOUR WORK, TAKE CARE OF THINGS AT HOME, OR GET ALONG WITH OTHER PEOPLE: NOT DIFFICULT AT ALL
5. POOR APPETITE OR OVEREATING: SEVERAL DAYS
3. TROUBLE FALLING OR STAYING ASLEEP: SEVERAL DAYS
2. FEELING DOWN, DEPRESSED OR HOPELESS: SEVERAL DAYS
6. FEELING BAD ABOUT YOURSELF - OR THAT YOU ARE A FAILURE OR HAVE LET YOURSELF OR YOUR FAMILY DOWN: NEARLY EVERY DAY
1. LITTLE INTEREST OR PLEASURE IN DOING THINGS: SEVERAL DAYS

## 2024-06-05 ENCOUNTER — OFFICE VISIT (OUTPATIENT)
Dept: INTERNAL MEDICINE CLINIC | Facility: CLINIC | Age: 54
End: 2024-06-05
Payer: COMMERCIAL

## 2024-06-05 VITALS
DIASTOLIC BLOOD PRESSURE: 61 MMHG | OXYGEN SATURATION: 96 % | SYSTOLIC BLOOD PRESSURE: 126 MMHG | HEIGHT: 63 IN | TEMPERATURE: 97.5 F | BODY MASS INDEX: 34.2 KG/M2 | WEIGHT: 193 LBS | HEART RATE: 64 BPM

## 2024-06-05 DIAGNOSIS — R73.03 PRE-DIABETES: ICD-10-CM

## 2024-06-05 DIAGNOSIS — F32.0 MAJOR DEPRESSIVE DISORDER, SINGLE EPISODE, MILD (HCC): ICD-10-CM

## 2024-06-05 DIAGNOSIS — K21.9 GASTROESOPHAGEAL REFLUX DISEASE, UNSPECIFIED WHETHER ESOPHAGITIS PRESENT: ICD-10-CM

## 2024-06-05 DIAGNOSIS — M06.9 RHEUMATOID ARTHRITIS, INVOLVING UNSPECIFIED SITE, UNSPECIFIED WHETHER RHEUMATOID FACTOR PRESENT (HCC): ICD-10-CM

## 2024-06-05 DIAGNOSIS — K82.8 BILIARY DYSKINESIA: ICD-10-CM

## 2024-06-05 DIAGNOSIS — I10 ESSENTIAL HYPERTENSION: Primary | ICD-10-CM

## 2024-06-05 LAB
ANION GAP SERPL CALC-SCNC: 10 MMOL/L (ref 9–18)
BUN SERPL-MCNC: 17 MG/DL (ref 6–23)
CALCIUM SERPL-MCNC: 9.9 MG/DL (ref 8.8–10.2)
CHLORIDE SERPL-SCNC: 103 MMOL/L (ref 98–107)
CO2 SERPL-SCNC: 29 MMOL/L (ref 20–28)
CREAT SERPL-MCNC: 0.85 MG/DL (ref 0.6–1.1)
EST. AVERAGE GLUCOSE BLD GHB EST-MCNC: 135 MG/DL
GLUCOSE SERPL-MCNC: 114 MG/DL (ref 70–99)
HBA1C MFR BLD: 6.3 % (ref 0–5.6)
POTASSIUM SERPL-SCNC: 4.4 MMOL/L (ref 3.5–5.1)
SODIUM SERPL-SCNC: 142 MMOL/L (ref 136–145)

## 2024-06-05 PROCEDURE — 3074F SYST BP LT 130 MM HG: CPT | Performed by: INTERNAL MEDICINE

## 2024-06-05 PROCEDURE — 3078F DIAST BP <80 MM HG: CPT | Performed by: INTERNAL MEDICINE

## 2024-06-05 PROCEDURE — 99214 OFFICE O/P EST MOD 30 MIN: CPT | Performed by: INTERNAL MEDICINE

## 2024-06-05 RX ORDER — DESVENLAFAXINE SUCCINATE 50 MG/1
50 TABLET, EXTENDED RELEASE ORAL DAILY
Qty: 90 TABLET | Refills: 1 | Status: SHIPPED | OUTPATIENT
Start: 2024-06-05

## 2024-06-05 RX ORDER — LOSARTAN POTASSIUM AND HYDROCHLOROTHIAZIDE 12.5; 5 MG/1; MG/1
1 TABLET ORAL DAILY
Qty: 90 TABLET | Refills: 1 | Status: SHIPPED | OUTPATIENT
Start: 2024-06-05

## 2024-06-05 ASSESSMENT — ENCOUNTER SYMPTOMS
ABDOMINAL PAIN: 1
SHORTNESS OF BREATH: 0

## 2024-06-05 NOTE — PROGRESS NOTES
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    US GUIDED CORE BREAST BIOPSY Right 2014    6:00 position Fragments of fibroadenoma, simple     Allergies:   No Known Allergies  Medications:   Current Outpatient Medications   Medication Sig Dispense Refill    losartan-hydroCHLOROthiazide (HYZAAR) 50-12.5 MG per tablet Take 1 tablet by mouth daily 90 tablet 1    desvenlafaxine succinate (PRISTIQ) 50 MG TB24 extended release tablet Take 1 tablet by mouth daily 90 tablet 1    vitamin D (ERGOCALCIFEROL) 1.25 MG (31329 UT) CAPS capsule Take 1 capsule by mouth once a week 12 capsule 1    LORazepam (ATIVAN) 1 MG tablet Take 1 tablet by mouth daily as needed.       No current facility-administered medications for this visit.     Social History:  Social History     Tobacco Use    Smoking status: Former     Current packs/day: 0.00     Average packs/day: 0.5 packs/day for 10.0 years (5.0 ttl pk-yrs)     Types: Cigarettes     Start date: 1989     Quit date: 1999     Years since quittin.3    Smokeless tobacco: Never   Substance Use Topics    Alcohol use: No     Family History  Family History   Problem Relation Age of Onset    Heart Attack Father     Heart Disease Father     Diabetes Father     Breast Cancer Maternal Grandmother 82    Stroke Maternal Grandmother     Breast Cancer Mother 72    Cancer Mother     Diabetes Mother     Breast Cancer Maternal Aunt 62    Hypertension Brother     Breast Cancer Maternal Aunt     Cancer Paternal Uncle     Heart Attack Paternal Uncle     Heart Disease Paternal Uncle     High Blood Pressure Paternal Uncle     Kidney Disease Paternal Uncle     Stroke Paternal Uncle     Diabetes Sister     High Blood Pressure Sister     Diabetes Brother     High Blood Pressure Brother     High Cholesterol Brother        Review of Systems   Constitutional:  Negative for chills, fatigue and fever.   Respiratory:  Negative

## 2024-06-11 DIAGNOSIS — M25.551 BILATERAL HIP PAIN: ICD-10-CM

## 2024-06-11 DIAGNOSIS — M06.4 INFLAMMATORY POLYARTHRITIS (HCC): Primary | ICD-10-CM

## 2024-06-11 DIAGNOSIS — M25.552 BILATERAL HIP PAIN: ICD-10-CM

## 2024-06-11 DIAGNOSIS — M53.3 SACROCOCCYGEAL DISORDERS, NOT ELSEWHERE CLASSIFIED: ICD-10-CM

## 2024-06-11 DIAGNOSIS — M15.9 OSTEOARTHRITIS OF MULTIPLE JOINTS, UNSPECIFIED OSTEOARTHRITIS TYPE: ICD-10-CM

## 2024-06-12 DIAGNOSIS — Z79.899 LONG TERM CURRENT USE OF IMMUNOSUPPRESSIVE DRUG: ICD-10-CM

## 2024-06-12 DIAGNOSIS — R79.89 ELEVATED LFTS: ICD-10-CM

## 2024-06-12 DIAGNOSIS — Z79.899 HIGH RISK MEDICATION USE: ICD-10-CM

## 2024-06-12 DIAGNOSIS — C50.919 MALIGNANT NEOPLASM OF BREAST IN FEMALE, ESTROGEN RECEPTOR POSITIVE, UNSPECIFIED LATERALITY, UNSPECIFIED SITE OF BREAST (HCC): ICD-10-CM

## 2024-06-12 DIAGNOSIS — Z79.52 LONG TERM (CURRENT) USE OF SYSTEMIC STEROIDS: ICD-10-CM

## 2024-06-12 DIAGNOSIS — M06.4 INFLAMMATORY POLYARTHRITIS (HCC): Primary | ICD-10-CM

## 2024-06-12 DIAGNOSIS — Z17.0 MALIGNANT NEOPLASM OF BREAST IN FEMALE, ESTROGEN RECEPTOR POSITIVE, UNSPECIFIED LATERALITY, UNSPECIFIED SITE OF BREAST (HCC): ICD-10-CM

## 2024-06-20 ENCOUNTER — TELEPHONE (OUTPATIENT)
Dept: RHEUMATOLOGY | Age: 54
End: 2024-06-20

## 2024-06-20 NOTE — TELEPHONE ENCOUNTER
OV 1/31/24-included Plan, Appt 7/11/24, Dx Inflamm Arth, Pt has been having B/L hip pain, sometimes it will move in my groin and outer hips. Especially if I walk a lot or do a lot of physical stuff, yard work, housework etc. it’s just dull achy pain. Pt did have b/l hip xrays at [facility] 6/19/24, scanned in media 6/20 for review, thanks      Plan:         1.  Vit d 2000 units daily  2.  Tramadol 50 mg up to every 8 hours as needed number of pills 90  3.  pristiq 50mg daily   4.  Prednisone 5 mg a day as needed -currently minimal use, call if you are having to take this regularly or increasing symptoms  5.  tylenol 1000mg every am and pm - scheduled  6.  Referral to GI, 53-year-old lady with history of breast cancer status postresection and inflammatory polyarthritis currently not on treatment with elevated LFTs  7.  RTC in 4 months with labs vitamin D call if any issues

## 2024-06-25 ENCOUNTER — OFFICE VISIT (OUTPATIENT)
Dept: RHEUMATOLOGY | Age: 54
End: 2024-06-25

## 2024-06-25 VITALS
HEIGHT: 63 IN | WEIGHT: 194 LBS | HEART RATE: 76 BPM | SYSTOLIC BLOOD PRESSURE: 155 MMHG | BODY MASS INDEX: 34.38 KG/M2 | DIASTOLIC BLOOD PRESSURE: 89 MMHG

## 2024-06-25 DIAGNOSIS — F32.0 MAJOR DEPRESSIVE DISORDER, SINGLE EPISODE, MILD (HCC): ICD-10-CM

## 2024-06-25 DIAGNOSIS — M70.62 TROCHANTERIC BURSITIS OF BOTH HIPS: ICD-10-CM

## 2024-06-25 DIAGNOSIS — E55.9 VITAMIN D DEFICIENCY, UNSPECIFIED: ICD-10-CM

## 2024-06-25 DIAGNOSIS — M15.9 GENERALIZED OSTEOARTHRITIS: ICD-10-CM

## 2024-06-25 DIAGNOSIS — Z79.899 HIGH RISK MEDICATION USE: ICD-10-CM

## 2024-06-25 DIAGNOSIS — Z79.899 LONG TERM CURRENT USE OF IMMUNOSUPPRESSIVE DRUG: ICD-10-CM

## 2024-06-25 DIAGNOSIS — M79.10 MYALGIA, UNSPECIFIED SITE: ICD-10-CM

## 2024-06-25 DIAGNOSIS — E55.9 HYPOVITAMINOSIS D: ICD-10-CM

## 2024-06-25 DIAGNOSIS — Z79.52 LONG TERM (CURRENT) USE OF SYSTEMIC STEROIDS: ICD-10-CM

## 2024-06-25 DIAGNOSIS — M06.4 INFLAMMATORY POLYARTHRITIS (HCC): Primary | ICD-10-CM

## 2024-06-25 DIAGNOSIS — M70.61 TROCHANTERIC BURSITIS OF BOTH HIPS: ICD-10-CM

## 2024-06-25 DIAGNOSIS — R53.83 OTHER FATIGUE: ICD-10-CM

## 2024-06-25 RX ORDER — TRAMADOL HYDROCHLORIDE 50 MG/1
50 TABLET ORAL EVERY 8 HOURS PRN
COMMUNITY
End: 2024-06-25 | Stop reason: SDUPTHER

## 2024-06-25 RX ORDER — PREDNISONE 5 MG/1
5 TABLET ORAL DAILY
COMMUNITY
End: 2024-06-25 | Stop reason: SDUPTHER

## 2024-06-25 RX ORDER — METHYLPREDNISOLONE ACETATE 80 MG/ML
80 INJECTION, SUSPENSION INTRA-ARTICULAR; INTRALESIONAL; INTRAMUSCULAR; SOFT TISSUE ONCE
Status: COMPLETED | OUTPATIENT
Start: 2024-06-25 | End: 2024-06-25

## 2024-06-25 RX ADMIN — METHYLPREDNISOLONE ACETATE 80 MG: 80 INJECTION, SUSPENSION INTRA-ARTICULAR; INTRALESIONAL; INTRAMUSCULAR; SOFT TISSUE at 12:46

## 2024-06-25 NOTE — PATIENT INSTRUCTIONS
1.  Vit d 50,000 units once weekly for now  2.  Tramadol 50 mg up to every 8 hours as needed number of pills 90  3.  pristiq 50mg daily   4.  Prednisone 5 mg a day as needed -currently minimal use, call if you are having to take this regularly or increasing symptoms  5.  tylenol 1000mg every am and pm - scheduled  6. Follow-up with GI  7.  Trochanteric bursa injected try not to overuse for the next couple days  8.  Resume gabapentin 300 mg once daily at 6 PM  9.   RTC in 4 months with labs vitamin D, lupus panel, ESR rheumatoid factor and CCP call if any issues    4MO,[facility] w pt,GI

## 2024-06-26 DIAGNOSIS — K21.9 GASTROESOPHAGEAL REFLUX DISEASE, UNSPECIFIED WHETHER ESOPHAGITIS PRESENT: ICD-10-CM

## 2024-06-26 RX ORDER — OMEPRAZOLE 20 MG/1
CAPSULE, DELAYED RELEASE ORAL
Qty: 90 CAPSULE | Refills: 1 | OUTPATIENT
Start: 2024-06-26

## 2024-07-02 RX ORDER — GABAPENTIN 300 MG/1
CAPSULE ORAL
Qty: 90 CAPSULE | Refills: 1 | Status: SHIPPED | OUTPATIENT
Start: 2024-07-02 | End: 2025-06-26

## 2024-07-02 RX ORDER — TRAMADOL HYDROCHLORIDE 50 MG/1
50 TABLET ORAL EVERY 8 HOURS PRN
Qty: 90 TABLET | Refills: 2 | Status: SHIPPED | OUTPATIENT
Start: 2024-07-02 | End: 2024-09-30

## 2024-07-02 RX ORDER — ERGOCALCIFEROL 1.25 MG/1
50000 CAPSULE ORAL WEEKLY
Qty: 12 CAPSULE | Refills: 1 | Status: SHIPPED | OUTPATIENT
Start: 2024-07-02

## 2024-07-02 RX ORDER — DESVENLAFAXINE SUCCINATE 50 MG/1
50 TABLET, EXTENDED RELEASE ORAL DAILY
Qty: 90 TABLET | Refills: 1 | Status: SHIPPED | OUTPATIENT
Start: 2024-07-02

## 2024-07-02 RX ORDER — PREDNISONE 5 MG/1
TABLET ORAL
Qty: 100 TABLET | Refills: 1 | Status: SHIPPED | OUTPATIENT
Start: 2024-07-02

## 2024-07-26 NOTE — PROGRESS NOTES
History:   Ms. Camille Mcintosh is a 53 y.o. female presenting with a history of hepatic steatosis, intermittent reflux and IBS-C.  In the past abdominal imaging has revealed hepatic steatosis and she has had mild elevations in her transaminases.  For reflux she is currently taking omeprazole as needed and only typically needs this twice a week.  She does not feel that the omeprazole works very well.  In regards to her IBS-C she was on Linzess but is not currently taking any medication typically has hard bowel movement every 2 to 3 days.    Historically:  ThisEGD/ colonoscopy 4/11/2019 Jordan Maier, DO   Findings:   Oropharynx: Normal  Esophagus: Normal  Cardia of the stomach: Normal  Body of the stomach: Normal  Fundus of the stomach: Normal  Antrum of the stomach: Excavated lesions: - Erosions  Duodenum: Normal  Anastomosis: Normal  Therapies: biopsy of stomach antrum  EBL- minimal  Specimens: Specimens were collected and sent to pathology.   \"GASTRIC ANTRUM BIOPSIES\":  FRAGMENT OF GASTRIC MUCOSA HAVING CHANGES   OF REPAIR.     Findings:   ANUS: Anal exam reveals no masses or hemorrhoids, sphincter tone is normal.   RECTUM: Rectal exam reveals no masses or hemorrhoids.   SIGMOID COLON: The mucosa is normal with good vascular pattern and without ulcers, diverticula, and polyps.   DESCENDING COLON: The mucosa is normal with good vascular pattern and without ulcers, diverticula, and polyps.   SPLENIC FLEXURE: The splenic flexure is normal.   TRANSVERSE COLON: The mucosa is normal with good vascular pattern and without ulcers, diverticula, and polyps.   HEPATIC FLEXURE: The hepatic flexure is normal.   ASCENDING COLON: The mucosa is normal with good vascular pattern and without ulcers, diverticula, and polyps.   CECUM: The appendiceal orifice appears normal. The ileocecal valve appears normal.   TERMINAL ILEUM: The terminal ileum was not entered.   Specimens: No specimens were collected     Past

## 2024-07-29 ENCOUNTER — OFFICE VISIT (OUTPATIENT)
Dept: GASTROENTEROLOGY | Age: 54
End: 2024-07-29
Payer: COMMERCIAL

## 2024-07-29 VITALS
SYSTOLIC BLOOD PRESSURE: 146 MMHG | RESPIRATION RATE: 18 BRPM | WEIGHT: 194 LBS | DIASTOLIC BLOOD PRESSURE: 83 MMHG | OXYGEN SATURATION: 92 % | BODY MASS INDEX: 34.37 KG/M2 | HEART RATE: 63 BPM

## 2024-07-29 DIAGNOSIS — K76.0 METABOLIC DYSFUNCTION-ASSOCIATED STEATOTIC LIVER DISEASE (MASLD): Primary | ICD-10-CM

## 2024-07-29 DIAGNOSIS — F11.20 OPIOID DEPENDENCE WITH CURRENT USE (HCC): ICD-10-CM

## 2024-07-29 PROCEDURE — 3077F SYST BP >= 140 MM HG: CPT | Performed by: INTERNAL MEDICINE

## 2024-07-29 PROCEDURE — 99244 OFF/OP CNSLTJ NEW/EST MOD 40: CPT | Performed by: INTERNAL MEDICINE

## 2024-07-29 PROCEDURE — 3079F DIAST BP 80-89 MM HG: CPT | Performed by: INTERNAL MEDICINE

## 2024-07-29 RX ORDER — FAMOTIDINE 40 MG/1
40 TABLET, FILM COATED ORAL PRN
Qty: 30 TABLET | Refills: 5 | Status: SHIPPED | OUTPATIENT
Start: 2024-07-29

## 2024-07-29 ASSESSMENT — ENCOUNTER SYMPTOMS
SHORTNESS OF BREATH: 0
CONSTIPATION: 0
ABDOMINAL DISTENTION: 0
VOMITING: 0
EYES NEGATIVE: 1
TROUBLE SWALLOWING: 0
BLOOD IN STOOL: 0
ABDOMINAL PAIN: 0
COLOR CHANGE: 0
DIARRHEA: 0
NAUSEA: 0

## 2024-07-31 ENCOUNTER — HOSPITAL ENCOUNTER (OUTPATIENT)
Dept: LAB | Age: 54
Discharge: HOME | End: 2024-08-03
Payer: COMMERCIAL

## 2024-07-31 ENCOUNTER — OFFICE VISIT (OUTPATIENT)
Dept: ONCOLOGY | Age: 54
End: 2024-07-31
Payer: COMMERCIAL

## 2024-07-31 VITALS
OXYGEN SATURATION: 95 % | TEMPERATURE: 98.7 F | HEART RATE: 68 BPM | RESPIRATION RATE: 16 BRPM | HEIGHT: 63 IN | BODY MASS INDEX: 34.2 KG/M2 | WEIGHT: 193 LBS | SYSTOLIC BLOOD PRESSURE: 109 MMHG | DIASTOLIC BLOOD PRESSURE: 64 MMHG

## 2024-07-31 DIAGNOSIS — Z17.0 MALIGNANT NEOPLASM OF LEFT BREAST IN FEMALE, ESTROGEN RECEPTOR POSITIVE, UNSPECIFIED SITE OF BREAST (HCC): ICD-10-CM

## 2024-07-31 DIAGNOSIS — C50.912 MALIGNANT NEOPLASM OF LEFT BREAST IN FEMALE, ESTROGEN RECEPTOR POSITIVE, UNSPECIFIED SITE OF BREAST (HCC): Primary | ICD-10-CM

## 2024-07-31 DIAGNOSIS — Z17.0 MALIGNANT NEOPLASM OF LEFT BREAST IN FEMALE, ESTROGEN RECEPTOR POSITIVE, UNSPECIFIED SITE OF BREAST (HCC): Primary | ICD-10-CM

## 2024-07-31 DIAGNOSIS — C50.912 MALIGNANT NEOPLASM OF LEFT BREAST IN FEMALE, ESTROGEN RECEPTOR POSITIVE, UNSPECIFIED SITE OF BREAST (HCC): ICD-10-CM

## 2024-07-31 LAB
ALBUMIN SERPL-MCNC: 4.2 G/DL (ref 3.5–5)
ALBUMIN SERPL-MCNC: 4.2 G/DL (ref 3.5–5)
ALBUMIN/GLOB SERPL: 1.1 (ref 1–1.9)
ALBUMIN/GLOB SERPL: 1.2 (ref 1–1.9)
ALP SERPL-CCNC: 138 U/L (ref 35–104)
ALP SERPL-CCNC: 143 U/L (ref 35–104)
ALT SERPL-CCNC: 47 U/L (ref 12–65)
ALT SERPL-CCNC: 49 U/L (ref 12–65)
ANION GAP SERPL CALC-SCNC: 11 MMOL/L (ref 9–18)
AST SERPL-CCNC: 36 U/L (ref 15–37)
AST SERPL-CCNC: 37 U/L (ref 15–37)
BASOPHILS # BLD: 0 K/UL (ref 0–0.2)
BASOPHILS NFR BLD: 1 % (ref 0–2)
BILIRUB DIRECT SERPL-MCNC: <0.2 MG/DL (ref 0–0.4)
BILIRUB SERPL-MCNC: 0.3 MG/DL (ref 0–1.2)
BILIRUB SERPL-MCNC: 0.3 MG/DL (ref 0–1.2)
BUN SERPL-MCNC: 16 MG/DL (ref 6–23)
CALCIUM SERPL-MCNC: 9.9 MG/DL (ref 8.8–10.2)
CHLORIDE SERPL-SCNC: 101 MMOL/L (ref 98–107)
CO2 SERPL-SCNC: 29 MMOL/L (ref 20–28)
CREAT SERPL-MCNC: 0.72 MG/DL (ref 0.6–1.1)
DIFFERENTIAL METHOD BLD: NORMAL
EOSINOPHIL # BLD: 0.1 K/UL (ref 0–0.8)
EOSINOPHIL NFR BLD: 2 % (ref 0.5–7.8)
ERYTHROCYTE [DISTWIDTH] IN BLOOD BY AUTOMATED COUNT: 14.4 % (ref 11.9–14.6)
GLOBULIN SER CALC-MCNC: 3.5 G/DL (ref 2.3–3.5)
GLOBULIN SER CALC-MCNC: 3.7 G/DL (ref 2.3–3.5)
GLUCOSE SERPL-MCNC: 93 MG/DL (ref 70–99)
HCT VFR BLD AUTO: 46.3 % (ref 35.8–46.3)
HGB BLD-MCNC: 15 G/DL (ref 11.7–15.4)
IMM GRANULOCYTES # BLD AUTO: 0 K/UL (ref 0–0.5)
IMM GRANULOCYTES NFR BLD AUTO: 1 % (ref 0–5)
LYMPHOCYTES # BLD: 2.4 K/UL (ref 0.5–4.6)
LYMPHOCYTES NFR BLD: 37 % (ref 13–44)
MCH RBC QN AUTO: 29.4 PG (ref 26.1–32.9)
MCHC RBC AUTO-ENTMCNC: 32.4 G/DL (ref 31.4–35)
MCV RBC AUTO: 90.6 FL (ref 82–102)
MONOCYTES # BLD: 0.4 K/UL (ref 0.1–1.3)
MONOCYTES NFR BLD: 5 % (ref 4–12)
NEUTS SEG # BLD: 3.6 K/UL (ref 1.7–8.2)
NEUTS SEG NFR BLD: 54 % (ref 43–78)
NRBC # BLD: 0 K/UL (ref 0–0.2)
PLATELET # BLD AUTO: 353 K/UL (ref 150–450)
PMV BLD AUTO: 9.7 FL (ref 9.4–12.3)
POTASSIUM SERPL-SCNC: 4.1 MMOL/L (ref 3.5–5.1)
PROT SERPL-MCNC: 7.7 G/DL (ref 6.3–8.2)
PROT SERPL-MCNC: 7.9 G/DL (ref 6.3–8.2)
RBC # BLD AUTO: 5.11 M/UL (ref 4.05–5.2)
SODIUM SERPL-SCNC: 141 MMOL/L (ref 136–145)
WBC # BLD AUTO: 6.6 K/UL (ref 4.3–11.1)

## 2024-07-31 PROCEDURE — 36415 COLL VENOUS BLD VENIPUNCTURE: CPT

## 2024-07-31 PROCEDURE — 99214 OFFICE O/P EST MOD 30 MIN: CPT | Performed by: INTERNAL MEDICINE

## 2024-07-31 PROCEDURE — 85025 COMPLETE CBC W/AUTO DIFF WBC: CPT

## 2024-07-31 PROCEDURE — 3074F SYST BP LT 130 MM HG: CPT | Performed by: INTERNAL MEDICINE

## 2024-07-31 PROCEDURE — 80053 COMPREHEN METABOLIC PANEL: CPT

## 2024-07-31 PROCEDURE — 3078F DIAST BP <80 MM HG: CPT | Performed by: INTERNAL MEDICINE

## 2024-07-31 RX ORDER — TAMOXIFEN CITRATE 10 MG/1
10 TABLET ORAL DAILY
Qty: 30 TABLET | Refills: 11 | Status: SHIPPED | OUTPATIENT
Start: 2024-07-31

## 2024-07-31 ASSESSMENT — PATIENT HEALTH QUESTIONNAIRE - PHQ9
SUM OF ALL RESPONSES TO PHQ QUESTIONS 1-9: 0
SUM OF ALL RESPONSES TO PHQ QUESTIONS 1-9: 0
1. LITTLE INTEREST OR PLEASURE IN DOING THINGS: NOT AT ALL
SUM OF ALL RESPONSES TO PHQ QUESTIONS 1-9: 0
SUM OF ALL RESPONSES TO PHQ QUESTIONS 1-9: 0
SUM OF ALL RESPONSES TO PHQ9 QUESTIONS 1 & 2: 0
2. FEELING DOWN, DEPRESSED OR HOPELESS: NOT AT ALL

## 2024-07-31 NOTE — PROGRESS NOTES
ENDOSCOPY    ENDOMETRIAL ABLATION  07/2018    FAT TRANSFER Bilateral 07/18/2023    BILATERAL FAT GRAFTING, SOFT TISSUE REVISION performed by Gerry Mcdowell MD at [facility] MAIN OR    HYSTERECTOMY, VAGINAL  08/18/2021    [facility]ANDEE ST[facility] LOC BREAST BIOPSY LEFT Left 01/14/2022    RANDEE STEROTACTIC LOC BREAST BIOPSY LEFT 1/14/2022 [facility] RADIOLOGY MAMMO    MASTECTOMY Bilateral 4/7/2022    BILATERAL BREAST MASTECTOMY performed by Fawad Keita MD at [facility] MAIN OR    JOSÉ MIGUEL AND BSO (CERVIX REMOVED)  08/18/2021    full hysterectomy    TUBAL LIGATION  1999    US GUIDED CORE BREAST BIOPSY Right 6/5/2014    7:00 pisition: Fibrocystic mastopathy having moderate intraductal hyperplasia, apocrine metaplasia and microcalcification.    US GUIDED CORE BREAST BIOPSY Right 6/5/2014    6:00 position Fragments of fibroadenoma, simple     Family History   Problem Relation Age of Onset    Heart Attack Father     Heart Disease Father     Diabetes Father     Breast Cancer Maternal Grandmother 82    Stroke Maternal Grandmother     Breast Cancer Mother 72    Cancer Mother     Diabetes Mother     Breast Cancer Maternal Aunt 62    Hypertension Brother     Breast Cancer Maternal Aunt     Cancer Paternal Uncle     Heart Attack Paternal Uncle     Heart Disease Paternal Uncle     High Blood Pressure Paternal Uncle     Kidney Disease Paternal Uncle     Stroke Paternal Uncle     Diabetes Sister     High Blood Pressure Sister     Diabetes Brother     High Blood Pressure Brother     High Cholesterol Brother      Social History     Socioeconomic History    Marital status:      Spouse name: Not on file    Number of children: Not on file    Years of education: Not on file    Highest education level: Not on file   Occupational History    Not on file   Tobacco Use    Smoking status: Former     Current packs/day: 0.00     Average packs/day: 0.5 packs/day for 10.0 years (5.0 ttl pk-yrs)     Types: Cigarettes     Start date: 2/16/1989     Quit date: 2/16/1999

## 2024-07-31 NOTE — PATIENT INSTRUCTIONS
Patient Information from Today's Visit    The members of your [facility] are listed below:    Physician Provider: Rex Hanson, Medical Oncologist  Advanced Practice Clinician: Yuli Rivera NP  Registered Nurse: Rogelio PARIKH RN  Navigator: N/A  Medical Assistant: Leann MOONEY MA  : Lisa RODRIGUEZ   Supportive Care Services: Edwige RAMIRES LMSW    Diagnosis: breast cancer      Follow Up Instructions:     Follow up in 3 months.    Treatment Summary has been discussed and given to patient:N/A      Current Labs:   Hospital Outpatient Visit on 07/31/2024   Component Date Value Ref Range Status    Sodium 07/31/2024 141  136 - 145 mmol/L Final    Potassium 07/31/2024 4.1  3.5 - 5.1 mmol/L Final    Chloride 07/31/2024 101  98 - 107 mmol/L Final    CO2 07/31/2024 29 (H)  20 - 28 mmol/L Final    Anion Gap 07/31/2024 11  9 - 18 mmol/L Final    Glucose 07/31/2024 93  70 - 99 mg/dL Final    Comment: <70 mg/dL Consistent with, but not fully diagnostic of hypoglycemia.  100 - 125 mg/dL Impaired fasting glucose/consistent with pre-diabetes mellitus.  > 126 mg/dl Fasting glucose consistent with overt diabetes mellitus      BUN 07/31/2024 16  6 - 23 MG/DL Final    Creatinine 07/31/2024 0.72  0.60 - 1.10 MG/DL Final    Est, Glom Filt Rate 07/31/2024 >90  >60 ml/min/1.73m2 Final    Comment:    Pediatric calculator link: https://www.kidney.org/professionals/kdoqi/gfr_calculatorped     These results are not intended for use in patients <18 years of age.     eGFR results are calculated without a race factor using  the 2021 CKD-EPI equation. Careful clinical correlation is recommended, particularly when comparing to results calculated using previous equations.  The CKD-EPI equation is less accurate in patients with extremes of muscle mass, extra-renal metabolism of creatinine, excessive creatine ingestion, or following therapy that affects renal tubular secretion.      Calcium 07/31/2024 9.9  8.8 - 10.2 MG/DL Final    Total

## 2024-08-01 ENCOUNTER — TELEPHONE (OUTPATIENT)
Dept: GASTROENTEROLOGY | Age: 54
End: 2024-08-01

## 2024-08-01 NOTE — TELEPHONE ENCOUNTER
Called patient with lab results per Dr Long:    \"LFTs are back to baseline.\"    No answer. LVM with information/ office # for any questions or concerns.

## 2024-08-13 ENCOUNTER — TELEPHONE (OUTPATIENT)
Dept: GASTROENTEROLOGY | Age: 54
End: 2024-08-13

## 2024-08-13 NOTE — TELEPHONE ENCOUNTER
Letter    PROVIDER FEEDBACK LOOP CALLED 3X     Patient:Camille Mcintosh  : 1970  Referring Provider: TRAM OLGUIN  Referral Type:  Imaging     Procedures:  [id number] - US ORGAN ELASTOGRAPHY  Date Service Ordered 2024        We were unable to reach Camille Mcintosh to schedule the test ordered by your office after 3 outreach attempts via either text, email and/or phone call.  Please call/follow up with your patient to explain the significance of the ordered test and direct the patient to call Central Scheduling to schedule the test at their earliest convenience.     Please complete one of the following actions from Quick Actions buttons:     Route to Provider:  Route message to ordering provider to seek next steps in care plan.     Telephone Encounter:  Telephone encounter will open.  Call patient to explain significance of ordered test and direct patient to call Central Scheduling to schedule test then Document details of call.     Open Referral:  Review referral notes or details if needed.     Close Referral:  Referral will open.  Document in Notes section of referral why the referral is being closed.  Examples of referral closure:  Patient had test done outside of of an office in the [facility], Patient refuses test, Patient no longer having symptoms, Unable to reach patient.  Only close the referral if you are sure the test will not proceed.     Thank you     Pre-Access Scheduling Team      Summary    Auto: [id number] PROVIDER FEEDBACK LOOP CALLED 3X

## 2024-08-23 ENCOUNTER — HOSPITAL ENCOUNTER (OUTPATIENT)
Dept: ULTRASOUND IMAGING | Age: 54
Discharge: HOME | End: 2024-08-23
Payer: COMMERCIAL

## 2024-08-23 DIAGNOSIS — K76.0 METABOLIC DYSFUNCTION-ASSOCIATED STEATOTIC LIVER DISEASE (MASLD): ICD-10-CM

## 2024-08-23 PROCEDURE — 76981 USE PARENCHYMA: CPT

## 2024-08-28 ENCOUNTER — TELEPHONE (OUTPATIENT)
Dept: GASTROENTEROLOGY | Age: 54
End: 2024-08-28

## 2024-08-28 DIAGNOSIS — K76.9 LIVER LESION: Primary | ICD-10-CM

## 2024-08-28 NOTE — TELEPHONE ENCOUNTER
Called patient with result message per Dr Long:    \"FibroScan revealed mild fatty liver disease.  There is also evidence of a possible lesion in the liver and I would recommend MRI of the liver with and without contrast.  Please schedule this for her.\"    Results reviewed with patient. MRI ordered. Gave Radiology scheduling #. She verbalized agreement/ understanding.

## 2024-09-03 ENCOUNTER — PATIENT MESSAGE (OUTPATIENT)
Dept: INTERNAL MEDICINE CLINIC | Facility: CLINIC | Age: 54
End: 2024-09-03

## 2024-09-04 ENCOUNTER — TELEPHONE (OUTPATIENT)
Dept: GASTROENTEROLOGY | Age: 54
End: 2024-09-04

## 2024-09-14 ENCOUNTER — HOSPITAL ENCOUNTER (OUTPATIENT)
Dept: MRI IMAGING | Age: 54
Discharge: HOME | End: 2024-09-17
Attending: INTERNAL MEDICINE
Payer: COMMERCIAL

## 2024-09-14 DIAGNOSIS — K76.9 LIVER LESION: ICD-10-CM

## 2024-09-14 PROCEDURE — 2580000003 HC RX 258: Performed by: INTERNAL MEDICINE

## 2024-09-14 PROCEDURE — A9579 GAD-BASE MR CONTRAST NOS,1ML: HCPCS | Performed by: INTERNAL MEDICINE

## 2024-09-14 PROCEDURE — 74183 MRI ABD W/O CNTR FLWD CNTR: CPT

## 2024-09-14 PROCEDURE — 6360000004 HC RX CONTRAST MEDICATION: Performed by: INTERNAL MEDICINE

## 2024-09-14 RX ORDER — SODIUM CHLORIDE 0.9 % (FLUSH) 0.9 %
10 SYRINGE (ML) INJECTION AS NEEDED
Status: DISCONTINUED | OUTPATIENT
Start: 2024-09-14 | End: 2024-09-18 | Stop reason: HOSPADM

## 2024-09-14 RX ADMIN — SODIUM CHLORIDE, PRESERVATIVE FREE 10 ML: 5 INJECTION INTRAVENOUS at 09:22

## 2024-09-14 RX ADMIN — GADOTERIDOL 18 ML: 279.3 INJECTION, SOLUTION INTRAVENOUS at 09:22

## 2024-09-16 ENCOUNTER — TELEPHONE (OUTPATIENT)
Dept: GASTROENTEROLOGY | Age: 54
End: 2024-09-16

## 2024-10-08 ENCOUNTER — OFFICE VISIT (OUTPATIENT)
Dept: INTERNAL MEDICINE CLINIC | Facility: CLINIC | Age: 54
End: 2024-10-08
Payer: COMMERCIAL

## 2024-10-08 VITALS
HEIGHT: 63 IN | SYSTOLIC BLOOD PRESSURE: 130 MMHG | DIASTOLIC BLOOD PRESSURE: 73 MMHG | TEMPERATURE: 97.5 F | WEIGHT: 195 LBS | BODY MASS INDEX: 34.55 KG/M2 | OXYGEN SATURATION: 93 % | HEART RATE: 65 BPM

## 2024-10-08 DIAGNOSIS — R73.03 PRE-DIABETES: ICD-10-CM

## 2024-10-08 DIAGNOSIS — F32.0 MAJOR DEPRESSIVE DISORDER, SINGLE EPISODE, MILD (HCC): ICD-10-CM

## 2024-10-08 DIAGNOSIS — K82.8 BILIARY DYSKINESIA: ICD-10-CM

## 2024-10-08 DIAGNOSIS — M06.9 RHEUMATOID ARTHRITIS, INVOLVING UNSPECIFIED SITE, UNSPECIFIED WHETHER RHEUMATOID FACTOR PRESENT (HCC): ICD-10-CM

## 2024-10-08 DIAGNOSIS — I10 ESSENTIAL HYPERTENSION: Primary | ICD-10-CM

## 2024-10-08 DIAGNOSIS — K21.9 GASTROESOPHAGEAL REFLUX DISEASE, UNSPECIFIED WHETHER ESOPHAGITIS PRESENT: ICD-10-CM

## 2024-10-08 PROCEDURE — 99214 OFFICE O/P EST MOD 30 MIN: CPT | Performed by: INTERNAL MEDICINE

## 2024-10-08 PROCEDURE — 3075F SYST BP GE 130 - 139MM HG: CPT | Performed by: INTERNAL MEDICINE

## 2024-10-08 PROCEDURE — 3078F DIAST BP <80 MM HG: CPT | Performed by: INTERNAL MEDICINE

## 2024-10-08 RX ORDER — LOSARTAN POTASSIUM AND HYDROCHLOROTHIAZIDE 12.5; 5 MG/1; MG/1
1 TABLET ORAL DAILY
Qty: 90 TABLET | Refills: 1 | Status: SHIPPED | OUTPATIENT
Start: 2024-10-08

## 2024-10-08 SDOH — ECONOMIC STABILITY: FOOD INSECURITY: WITHIN THE PAST 12 MONTHS, YOU WORRIED THAT YOUR FOOD WOULD RUN OUT BEFORE YOU GOT MONEY TO BUY MORE.: NEVER TRUE

## 2024-10-08 SDOH — ECONOMIC STABILITY: FOOD INSECURITY: WITHIN THE PAST 12 MONTHS, THE FOOD YOU BOUGHT JUST DIDN'T LAST AND YOU DIDN'T HAVE MONEY TO GET MORE.: NEVER TRUE

## 2024-10-08 SDOH — ECONOMIC STABILITY: INCOME INSECURITY: HOW HARD IS IT FOR YOU TO PAY FOR THE VERY BASICS LIKE FOOD, HOUSING, MEDICAL CARE, AND HEATING?: NOT HARD AT ALL

## 2024-10-08 ASSESSMENT — PATIENT HEALTH QUESTIONNAIRE - PHQ9
8. MOVING OR SPEAKING SO SLOWLY THAT OTHER PEOPLE COULD HAVE NOTICED. OR THE OPPOSITE, BEING SO FIGETY OR RESTLESS THAT YOU HAVE BEEN MOVING AROUND A LOT MORE THAN USUAL: NOT AT ALL
9. THOUGHTS THAT YOU WOULD BE BETTER OFF DEAD, OR OF HURTING YOURSELF: NOT AT ALL
3. TROUBLE FALLING OR STAYING ASLEEP: NOT AT ALL
2. FEELING DOWN, DEPRESSED OR HOPELESS: NOT AT ALL
5. POOR APPETITE OR OVEREATING: NOT AT ALL
SUM OF ALL RESPONSES TO PHQ9 QUESTIONS 1 & 2: 0
1. LITTLE INTEREST OR PLEASURE IN DOING THINGS: NOT AT ALL
SUM OF ALL RESPONSES TO PHQ QUESTIONS 1-9: 0
6. FEELING BAD ABOUT YOURSELF - OR THAT YOU ARE A FAILURE OR HAVE LET YOURSELF OR YOUR FAMILY DOWN: NOT AT ALL
SUM OF ALL RESPONSES TO PHQ QUESTIONS 1-9: 0
SUM OF ALL RESPONSES TO PHQ QUESTIONS 1-9: 0
7. TROUBLE CONCENTRATING ON THINGS, SUCH AS READING THE NEWSPAPER OR WATCHING TELEVISION: NOT AT ALL
4. FEELING TIRED OR HAVING LITTLE ENERGY: NOT AT ALL
SUM OF ALL RESPONSES TO PHQ QUESTIONS 1-9: 0

## 2024-10-08 ASSESSMENT — ENCOUNTER SYMPTOMS: SHORTNESS OF BREATH: 0

## 2024-10-08 ASSESSMENT — ANXIETY QUESTIONNAIRES
2. NOT BEING ABLE TO STOP OR CONTROL WORRYING: NOT AT ALL
4. TROUBLE RELAXING: NOT AT ALL
3. WORRYING TOO MUCH ABOUT DIFFERENT THINGS: NOT AT ALL
GAD7 TOTAL SCORE: 0
7. FEELING AFRAID AS IF SOMETHING AWFUL MIGHT HAPPEN: NOT AT ALL
6. BECOMING EASILY ANNOYED OR IRRITABLE: NOT AT ALL
1. FEELING NERVOUS, ANXIOUS, OR ON EDGE: NOT AT ALL
IF YOU CHECKED OFF ANY PROBLEMS ON THIS QUESTIONNAIRE, HOW DIFFICULT HAVE THESE PROBLEMS MADE IT FOR YOU TO DO YOUR WORK, TAKE CARE OF THINGS AT HOME, OR GET ALONG WITH OTHER PEOPLE: NOT DIFFICULT AT ALL
5. BEING SO RESTLESS THAT IT IS HARD TO SIT STILL: NOT AT ALL

## 2024-10-08 NOTE — PROGRESS NOTES
[facility]  Lake Wasserman M.D.  Internal Medicine  [address] [place] [id number]  Office : [phone]  Fax : [phone]    Chief Complaint   Patient presents with    Hypertension     4 mo follow up       History of Present Illness:  Camille Mcintosh is a 54 y.o. female.  HPI    Hypertension  Patient is in for Hypertension which is stable.    Diet and Lifestyle: generally follows a low sodium diet, exercises sporadically, nonsmoker  Home BP Monitoring: is not measured at home. Patient's recent blood pressures were previously   BP Readings from Last 3 Encounters:   10/08/24 130/73   07/31/24 109/64   07/29/24 (!) 146/83   .   taking medications as instructed, no medication side effects noted, no TIA's, no chest pain on exertion, no dyspnea on exertion, no swelling of ankles. Cardiac risk factors consist of dyslipidemia, obesity, hypertension.     Lab Results   Component Value Date/Time     07/31/2024 12:37 PM    K 4.1 07/31/2024 12:37 PM     07/31/2024 12:37 PM    CO2 29 07/31/2024 12:37 PM    BUN 16 07/31/2024 12:37 PM    GFRAA >60 08/31/2022 02:32 PM      Pre-Diabetes  Hemoglobin A1C   Date Value Ref Range Status   06/05/2024 6.3 (H) 0 - 5.6 % Final     Comment:     Reference Range  Normal       <5.7%  Prediabetes  5.7-6.4%  Diabetes     >6.4%       RA  Not taking MTX or Folate.  No longer on prednisone  Lab Results   Component Value Date    CRP 6 05/08/2020     Lab Results   Component Value Date    SEDRATE 27 05/08/2020       GERD  Controlled with intermittent use of pepcid    Depression with anxiety  Pristiq decreased and rheumatologist increased it back to 50 mg.  She liked cymbalta better but does not want to change now and declines referral to psychiatry.      Biliary Dyskinesia  Abdominal surgery post-poned due to recent diagnosis of breast cancer.  Still having intermittent pain  Lab Results   Component Value Date    ALT 47 07/31/2024

## 2024-10-09 RX ORDER — LOSARTAN POTASSIUM AND HYDROCHLOROTHIAZIDE 12.5; 5 MG/1; MG/1
1 TABLET ORAL DAILY
Qty: 90 TABLET | Refills: 1 | Status: CANCELLED | OUTPATIENT
Start: 2024-10-09

## 2024-10-26 LAB
25(OH)D3+25(OH)D2 SERPL-MCNC: 25.7 NG/ML (ref 30–100)
ERYTHROCYTE [SEDIMENTATION RATE] IN BLOOD BY WESTERGREN METHOD: 2 MM/HR (ref 0–40)
RHEUMATOID FACT SERPL-ACNC: 10.4 IU/ML

## 2024-10-28 LAB
CENTROMERE B AB SER-ACNC: <0.2 AI (ref 0–0.9)
CHROMATIN AB SERPL-ACNC: <0.2 AI (ref 0–0.9)
DSDNA AB SER-ACNC: <1 IU/ML (ref 0–9)
ENA JO1 AB SER-ACNC: <0.2 AI (ref 0–0.9)
ENA RNP AB SER-ACNC: <0.2 AI (ref 0–0.9)
ENA SCL70 AB SER-ACNC: <0.2 AI (ref 0–0.9)
ENA SM AB SER-ACNC: <0.2 AI (ref 0–0.9)
ENA SS-A AB SER-ACNC: <0.2 AI (ref 0–0.9)
ENA SS-B AB SER-ACNC: <0.2 AI (ref 0–0.9)
Lab: NORMAL

## 2024-10-29 ENCOUNTER — OFFICE VISIT (OUTPATIENT)
Dept: RHEUMATOLOGY | Age: 54
End: 2024-10-29
Payer: COMMERCIAL

## 2024-10-29 VITALS
BODY MASS INDEX: 34.91 KG/M2 | HEART RATE: 68 BPM | DIASTOLIC BLOOD PRESSURE: 85 MMHG | WEIGHT: 197 LBS | SYSTOLIC BLOOD PRESSURE: 139 MMHG | HEIGHT: 63 IN

## 2024-10-29 DIAGNOSIS — Z79.899 HIGH RISK MEDICATION USE: ICD-10-CM

## 2024-10-29 DIAGNOSIS — E55.9 VITAMIN D DEFICIENCY, UNSPECIFIED: ICD-10-CM

## 2024-10-29 DIAGNOSIS — C50.919 MALIGNANT NEOPLASM OF BREAST IN FEMALE, ESTROGEN RECEPTOR POSITIVE, UNSPECIFIED LATERALITY, UNSPECIFIED SITE OF BREAST (HCC): ICD-10-CM

## 2024-10-29 DIAGNOSIS — R53.83 OTHER FATIGUE: ICD-10-CM

## 2024-10-29 DIAGNOSIS — Z79.899 LONG TERM CURRENT USE OF IMMUNOSUPPRESSIVE DRUG: ICD-10-CM

## 2024-10-29 DIAGNOSIS — Z17.0 MALIGNANT NEOPLASM OF BREAST IN FEMALE, ESTROGEN RECEPTOR POSITIVE, UNSPECIFIED LATERALITY, UNSPECIFIED SITE OF BREAST (HCC): ICD-10-CM

## 2024-10-29 DIAGNOSIS — Z79.52 LONG TERM (CURRENT) USE OF SYSTEMIC STEROIDS: ICD-10-CM

## 2024-10-29 DIAGNOSIS — F32.0 MAJOR DEPRESSIVE DISORDER, SINGLE EPISODE, MILD (HCC): ICD-10-CM

## 2024-10-29 DIAGNOSIS — M15.9 GENERALIZED OSTEOARTHRITIS: ICD-10-CM

## 2024-10-29 DIAGNOSIS — E55.9 HYPOVITAMINOSIS D: ICD-10-CM

## 2024-10-29 DIAGNOSIS — M06.4 INFLAMMATORY POLYARTHRITIS (HCC): Primary | ICD-10-CM

## 2024-10-29 PROCEDURE — 99215 OFFICE O/P EST HI 40 MIN: CPT | Performed by: INTERNAL MEDICINE

## 2024-10-29 PROCEDURE — 3075F SYST BP GE 130 - 139MM HG: CPT | Performed by: INTERNAL MEDICINE

## 2024-10-29 PROCEDURE — 3079F DIAST BP 80-89 MM HG: CPT | Performed by: INTERNAL MEDICINE

## 2024-10-29 NOTE — PATIENT INSTRUCTIONS
1.  Vit d 50,000 units changed to twice weekly for the next month then once weekly thereafter.    2.  Tramadol 50 mg up to every 8 hours as needed number of pills 90  3.  pristiq 50mg daily   4.  Prednisone 5 mg a day as needed -currently minimal use   5.  tylenol 1000mg every am and pm - scheduled  6.   gabapentin 300 mg once daily at 6 PM as needed for flares  7.  [facility] for possible weight loss strategies  8.   RTC in 4 months with labs vitamin D, lupus panel, ESR rheumatoid factor and CCP call if any issues    4MO,Labs BS prior,Arispe   Cardiac EP Progress Note          Patient: Lorenzobessy Buitrago Jr. Date: 2022   : 1932 PCP: Rajinder Jin MD   89 year old male Ref Physician: Dr. Black       Subjective: Patient presents today for his annual f/u for his Generator Replacement Bi-Ventricular ICD (Medtronic-Amplia), device, RA lead (5076), RV lead (Sprint Quattro dual coil DF1) are MRI safe, LV lead is not (Model# 4194). He has a h/o CHF. Medications and allergies reviewed. He recently had a hernia repair in 2020 by Dr. Chow.    Pertinent Reviewed: Allergies, Medical History, and Medications    CARDIAC HISTORY:  1 AF - Atrial fibrillation [CHADSVASC 6, HASBLED 3]   1 BivICD in place [Medtronic] - 3/14/2017   2 CAD [PCI to LAD ]   2 Stroke [with petechial bleed] - 3/14/2017   4 Ventricular tachycardia   5 Chronic systolic heart failure     RISK FACTORS:  1 Hypertension   2 Family History of CAD [Less than 60 years of age]   3 Tobacco Use: No/never     Visit Vitals  /52 (BP Location: LUE - Left upper extremity, Patient Position: Sitting, Cuff Size: Regular)   Pulse 74   Ht 5' 10\" (1.778 m)   Wt 81.1 kg (178 lb 12.8 oz)   BMI 25.66 kg/m²       Rhythm: Sinus Rhythm and Bivpaced    Medications:  Current Outpatient Medications   Medication Sig   • docusate sodium (COLACE) 100 MG capsule Take 1 capsule by mouth daily as needed for Constipation.   • HYDROcodone-acetaminophen (NORCO) 5-325 MG per tablet Take 1 tablet by mouth every 6 hours as needed for Pain.   • apixaBAN (ELIQUIS) 2.5 MG Tab Take 1 tablet by mouth 2 times daily. OK to resume Friday evening   • allopurinol (ZYLOPRIM) 300 MG tablet Take 300 mg by mouth daily.   • Ascorbic Acid (VITAMIN C) 1000 MG tablet Take 1,000 mg by mouth daily.   • Cholecalciferol (VITAMIN D3 PO) Take 2,000 Units by mouth daily.   • ezetimibe (ZETIA) 10 MG tablet Take 10 mg by mouth daily.   • lovastatin (MEVACOR) 20 MG tablet Take 20 mg by mouth nightly.   • metoPROLOL tartrate  (LOPRESSOR) 25 MG tablet Take 25 mg by mouth every 12 hours.   • pantoprazole (PROTONIX) 40 MG tablet Take 40 mg by mouth daily.   • tamsulosin (FLOMAX) 0.4 MG Cap Take 0.4 mg by mouth nightly.   • Coenzyme Q10 (COQ10) 400 MG Cap Take by mouth daily.   • escitalopram (LEXAPRO) 5 MG tablet Take 5 mg by mouth daily.   • Multiple Vitamins-Minerals (VITAMIN - THERAPEUTIC MULTIVITAMINS W/MINERALS) tablet Take 1 tablet by mouth daily.   • vitamin E 400 UNIT capsule Take 400 Units by mouth daily.   • Cyanocobalamin (B-12 IJ) Inject as directed every 30 days.   • midodrine (PROAMATINE) 10 MG tablet Take 10 mg by mouth 3 times daily.   • MELATONIN PO Take 10 mg by mouth nightly.     No current facility-administered medications for this visit.         Review of Systems:  Constitutional: Negative for fatigue, change in activity level or change in weight.   Skin: Negative for edema, pallor, rashes or open wounds.   HEENT: Negative for visual changes, epistaxis or headaches.  Respiratory: Negative for cough, orthopnea, paroxsymal nocturnal dyspnea, wheezing or shortness of breath with or without exertion.    Cardiovascular: Negative for exertional chest discomfort, chest pressure, palpitations or diaphoresis. Negative for lightheadedness or dizziness. Negative for near syncope or syncope.  Negative for intermittent leg claudication.   Gastrointestinal: Negative for abdominal pain.   Genitourinary: Negative for hematuria.  Extremities:  Negative for edema, petechiae or clubbing.  Neuro:  Negative for changes in speech, sensory deficits or change in motor functions.  Endocrine: Negative for heat intolerance, excessive thirst or urination  .  Hematological: Negative for bleeding or brusing.  Psych: Negative for change in affect, change in mentation or sleep disturbance.    Physical Exam:   General appearance: alert, appears stated age, and no distress  Eyes: negative  Neck: no adenopathy and no JVD (jugular venous  distention)  Lungs: clear to auscultation bilaterally  Heart: regular rate and rhythm, S1, S2 normal, no murmur, click, rub or gallop  Abdomen: soft, non-tender; bowel sounds normal; no masses,  no organomegaly  Extremities: extremities normal, atraumatic, no cyanosis or edema  Neurologic: Grossly normal    Laboratory Results:    Lab Results   Component Value Date    SODIUM 142 03/07/2022    SODIUM 142 06/10/2021    SODIUM 138 01/08/2019    SODIUM 143 02/22/2018    POTASSIUM 4.4 03/07/2022    POTASSIUM 4.4 06/10/2021    POTASSIUM 4.4 01/08/2019    POTASSIUM 5.0 02/22/2018    CHLORIDE 109 (H) 03/07/2022    CHLORIDE 108 (H) 06/10/2021    CO2 28 03/07/2022    CO2 28 06/10/2021    CO2 28 01/08/2019    CO2 28 02/22/2018    ANIONGAP 9 (L) 03/07/2022    ANIONGAP 10 06/10/2021    ANIONGAP 11 01/08/2019    ANIONGAP 12 02/22/2018    GLUCOSE 94 03/07/2022    GLUCOSE 96 06/10/2021    GLUCOSE 136 (H) 01/08/2019    GLUCOSE 125 (H) 02/22/2018    BUN 26 (H) 03/07/2022    BUN 27 (H) 06/10/2021    BUN 22 (H) 01/08/2019    BUN 29 (H) 02/22/2018    CREATININE 1.33 (H) 03/07/2022    CREATININE 1.44 (H) 06/10/2021    CREATININE 1.41 (H) 01/08/2019    CREATININE 1.29 (H) 02/22/2018    GFRA 51 01/08/2019    GFRA 57 02/22/2018    GFRNA 44 01/08/2019    GFRNA 50 02/22/2018    BCRAT 20 03/07/2022    BCRAT 19 06/10/2021    BCRAT 16 01/08/2019    BCRAT 22 02/22/2018    CALCIUM 9.6 03/07/2022    CALCIUM 8.9 06/10/2021    BILIRUBIN 0.5 04/14/2021    BILIRUBIN 0.5 02/17/2021    BILIRUBIN 0.6 01/08/2019    BILIRUBIN 0.3 01/18/2017    AST 26 03/07/2022    AST 23 06/10/2021    AST 21 01/08/2019    AST 21 01/18/2017    GPT 38 03/07/2022    GPT 35 06/10/2021    GPT 32 01/08/2019    GPT 32 01/18/2017    ALKPT 57 04/14/2021    ALKPT 49 02/17/2021    ALKPT 57 01/08/2019    ALKPT 51 01/18/2017    TOTPROTEIN 6.8 04/14/2021    TOTPROTEIN 6.6 02/17/2021    TOTPROTEIN 7.4 01/08/2019    TOTPROTEIN 7.1 01/18/2017    ALBUMIN 3.6 04/14/2021    ALBUMIN 3.7  02/17/2021    ALBUMIN 3.6 01/08/2019    ALBUMIN 3.7 01/18/2017    GLOB 3.2 04/14/2021    GLOB 2.9 02/17/2021    GLOB 3.8 01/08/2019    GLOB 3.4 01/18/2017    AGR 1.1 04/14/2021    AGR 1.3 02/17/2021    AGR 0.9 (L) 01/08/2019    AGR 1.1 01/18/2017    MG 1.8 01/21/2017    TSH 1.195 01/19/2017    INR 1.1 01/08/2019    INR 1.0 02/22/2018     CARDIOVASCULAR PROCEDURES:  CATH LAB:  PCI (PCI- LAD multiple MARCELO to LAD) - 11/28/2006   ECHO/MUGA:  Echo (EF 0.15 (15%), 15-20%; mod MR, NL LA size, No LVH or PHTN) - 1/19/2017   Echo (EF 0.15 (15%), mod MR) - 1/3/2017   ELECTROPHYSIOLOGY:  Devices (Generator Replacement Bi-Ventricular ICD (Medtronic-Amplia), device, RA lead (5076), RV lead (Sprint Quattro dual coil DF1) are MRI safe, LV lead is not (Model# 4194)) - 2/28/2018   Devices (Generator Replacement Bi-Ventricular ICD (Medtronic-Protecta- CRT-D), original device DCICD placed by Dr. Noemi CLARK 2004 then upgraded to biv ICD 2008) - 10/23/2012     Plans/Recommendations:  1. Pacemaker reprogramming/check  This is a BivICD device.  The incision at the device implantation site is well healed with no evidence of bleeding or infection.  He will continue to be followed in device clinic.  The patient's device check was normal today with acceptable lead parameters including impedance, sensing and thresholds.  Bivpacing percentage is 95%. Battery has 2 yrs left until end of life.     -     Heart Procedure  2. Chronic systolic heart failure (CMS/HCC)  The patient has mild exertional symptoms.  (NYHA II). Patient appears to be euvolemic.  Patient is tolerating the current beta-blocker dose.  The patient is compliant with their CHF regimen.  The patient was instructed on a low sodium diet.  The patient was given instructions on congestive heart failure.  Patient will maintain and submit a BP diary.  No changes on medications.         Discussed with Patient/and Family and Primary service and questions answered

## 2024-10-29 NOTE — PROGRESS NOTES
Subjective:      HPI:          Permanent history      Mrs. Mcintohs is a 54-year-old  lady with past medical history significant for hypertension, GERD, obesity, depression, sacral pain and chronic myalgias and arthralgias who presents for evaluation.    Patient reports longstanding muscular pain. Has constant body ache, radaites down shoulders, across shoulderblades, down into biceps, radiates down hips. Tried cymbatla, tail bone pain. Tried lyrica- still no relief. Always in pain. Able to do normal activites, but doing an all day things, she is in misery. Worst pain in back, shulders and hips.  Tailbone and hip pain started in 2017. cymbalta or lisa did not help with the pain. Worst pain on rt side. Will have husbnad push in shoulder, feels like their are knots in her shoulder, but this causes pain. Now she takes pristiq and lyrica.  But cannot take lyrica, makes her very sleepy, unale to work. Never tried gabapentin. Takes lyrica in pm. Takes pristiq in am. Switched from cymbalta to pristiq bc she was feeling depression.  Taken pristiq for about a few months- has missed some dose, worse when she doesn't take it. When missed lrica, doesn't notice a difference in pain. Tried to get off pristiq bc she doesn't want to take meds, no side effects. Tried ibuprofen, didn't help- doesn't take it- has prescription for it. Hasn't tried tylenol. Pain in shoulders, back, down to tailbone. Feet will hurt first thing in the morning, but walking around it goes away- takes about 10 mins.  No significant joint swelling.  Symptoms worse with use better with rest.  Massage helps. Used to work out, had to stop bc of severe cramps from working out. A lot of movement makes it worse- joyce moving heavy furniture. Even doing normal household chores cause pain. This causes a spiral where she then gets upset, and sad cause she hates not being able to do these normal things again. No injury that she knows that started

## 2024-10-30 LAB — CCP IGA+IGG SERPL IA-ACNC: 11 UNITS (ref 0–19)

## 2024-10-31 ENCOUNTER — OFFICE VISIT (OUTPATIENT)
Dept: ONCOLOGY | Age: 54
End: 2024-10-31
Payer: COMMERCIAL

## 2024-10-31 ENCOUNTER — HOSPITAL ENCOUNTER (OUTPATIENT)
Dept: LAB | Age: 54
Discharge: HOME | End: 2024-10-31
Payer: COMMERCIAL

## 2024-10-31 VITALS
WEIGHT: 199 LBS | HEIGHT: 63 IN | TEMPERATURE: 98.2 F | SYSTOLIC BLOOD PRESSURE: 105 MMHG | BODY MASS INDEX: 35.26 KG/M2 | DIASTOLIC BLOOD PRESSURE: 77 MMHG | RESPIRATION RATE: 16 BRPM | HEART RATE: 67 BPM | OXYGEN SATURATION: 98 %

## 2024-10-31 DIAGNOSIS — Z17.0 MALIGNANT NEOPLASM OF LEFT BREAST IN FEMALE, ESTROGEN RECEPTOR POSITIVE, UNSPECIFIED SITE OF BREAST (HCC): ICD-10-CM

## 2024-10-31 DIAGNOSIS — R23.2 HOT FLASHES: ICD-10-CM

## 2024-10-31 DIAGNOSIS — C77.3 SECONDARY AND UNSPECIFIED MALIGNANT NEOPLASM OF AXILLA AND UPPER LIMB LYMPH NODES (HCC): ICD-10-CM

## 2024-10-31 DIAGNOSIS — C50.912 MALIGNANT NEOPLASM OF LEFT BREAST IN FEMALE, ESTROGEN RECEPTOR POSITIVE, UNSPECIFIED SITE OF BREAST (HCC): Primary | ICD-10-CM

## 2024-10-31 DIAGNOSIS — C50.912 MALIGNANT NEOPLASM OF LEFT BREAST IN FEMALE, ESTROGEN RECEPTOR POSITIVE, UNSPECIFIED SITE OF BREAST (HCC): ICD-10-CM

## 2024-10-31 DIAGNOSIS — Z17.0 MALIGNANT NEOPLASM OF LEFT BREAST IN FEMALE, ESTROGEN RECEPTOR POSITIVE, UNSPECIFIED SITE OF BREAST (HCC): Primary | ICD-10-CM

## 2024-10-31 LAB
ALBUMIN SERPL-MCNC: 3.8 G/DL (ref 3.5–5)
ALBUMIN/GLOB SERPL: 1.1 (ref 1–1.9)
ALP SERPL-CCNC: 118 U/L (ref 35–104)
ALT SERPL-CCNC: 37 U/L (ref 8–45)
ANION GAP SERPL CALC-SCNC: 11 MMOL/L (ref 7–16)
AST SERPL-CCNC: 31 U/L (ref 15–37)
BASOPHILS # BLD: 0 K/UL (ref 0–0.2)
BASOPHILS NFR BLD: 1 % (ref 0–2)
BILIRUB SERPL-MCNC: 0.3 MG/DL (ref 0–1.2)
BUN SERPL-MCNC: 18 MG/DL (ref 6–23)
CALCIUM SERPL-MCNC: 9.8 MG/DL (ref 8.8–10.2)
CHLORIDE SERPL-SCNC: 103 MMOL/L (ref 98–107)
CO2 SERPL-SCNC: 29 MMOL/L (ref 20–29)
CREAT SERPL-MCNC: 0.81 MG/DL (ref 0.6–1.1)
DIFFERENTIAL METHOD BLD: ABNORMAL
EOSINOPHIL # BLD: 0.2 K/UL (ref 0–0.8)
EOSINOPHIL NFR BLD: 3 % (ref 0.5–7.8)
ERYTHROCYTE [DISTWIDTH] IN BLOOD BY AUTOMATED COUNT: 15.1 % (ref 11.9–14.6)
GLOBULIN SER CALC-MCNC: 3.6 G/DL (ref 2.3–3.5)
GLUCOSE SERPL-MCNC: 106 MG/DL (ref 70–99)
HCT VFR BLD AUTO: 42.7 % (ref 35.8–46.3)
HGB BLD-MCNC: 13.8 G/DL (ref 11.7–15.4)
IMM GRANULOCYTES # BLD AUTO: 0 K/UL (ref 0–0.5)
IMM GRANULOCYTES NFR BLD AUTO: 0 % (ref 0–5)
LYMPHOCYTES # BLD: 2.2 K/UL (ref 0.5–4.6)
LYMPHOCYTES NFR BLD: 36 % (ref 13–44)
MCH RBC QN AUTO: 28.9 PG (ref 26.1–32.9)
MCHC RBC AUTO-ENTMCNC: 32.3 G/DL (ref 31.4–35)
MCV RBC AUTO: 89.3 FL (ref 82–102)
MONOCYTES # BLD: 0.3 K/UL (ref 0.1–1.3)
MONOCYTES NFR BLD: 5 % (ref 4–12)
NEUTS SEG # BLD: 3.3 K/UL (ref 1.7–8.2)
NEUTS SEG NFR BLD: 55 % (ref 43–78)
NRBC # BLD: 0 K/UL (ref 0–0.2)
PLATELET # BLD AUTO: 339 K/UL (ref 150–450)
PMV BLD AUTO: 10 FL (ref 9.4–12.3)
POTASSIUM SERPL-SCNC: 3.8 MMOL/L (ref 3.5–5.1)
PROT SERPL-MCNC: 7.4 G/DL (ref 6.3–8.2)
RBC # BLD AUTO: 4.78 M/UL (ref 4.05–5.2)
SODIUM SERPL-SCNC: 143 MMOL/L (ref 136–145)
WBC # BLD AUTO: 6.1 K/UL (ref 4.3–11.1)

## 2024-10-31 PROCEDURE — 85025 COMPLETE CBC W/AUTO DIFF WBC: CPT

## 2024-10-31 PROCEDURE — 36415 COLL VENOUS BLD VENIPUNCTURE: CPT

## 2024-10-31 PROCEDURE — 99214 OFFICE O/P EST MOD 30 MIN: CPT | Performed by: NURSE PRACTITIONER

## 2024-10-31 PROCEDURE — 80053 COMPREHEN METABOLIC PANEL: CPT

## 2024-10-31 PROCEDURE — 3074F SYST BP LT 130 MM HG: CPT | Performed by: NURSE PRACTITIONER

## 2024-10-31 PROCEDURE — 3078F DIAST BP <80 MM HG: CPT | Performed by: NURSE PRACTITIONER

## 2024-10-31 ASSESSMENT — PATIENT HEALTH QUESTIONNAIRE - PHQ9
SUM OF ALL RESPONSES TO PHQ QUESTIONS 1-9: 0
2. FEELING DOWN, DEPRESSED OR HOPELESS: NOT AT ALL
SUM OF ALL RESPONSES TO PHQ9 QUESTIONS 1 & 2: 0
SUM OF ALL RESPONSES TO PHQ QUESTIONS 1-9: 0
1. LITTLE INTEREST OR PLEASURE IN DOING THINGS: NOT AT ALL
SUM OF ALL RESPONSES TO PHQ QUESTIONS 1-9: 0
SUM OF ALL RESPONSES TO PHQ QUESTIONS 1-9: 0

## 2024-10-31 NOTE — PROGRESS NOTES
[facility]: Office Visit Established Patient    Chief Complaint:    Chief Complaint   Patient presents with    Follow-up     History of Present Illness:  Ms. Mcintosh is a 54 y.o. female who returns today for management of breast cancer.  She initially presented for a routine bilateral screening mammogram on 1/8/22 which identified left breast calcifications. Further evaluation with left breast diagnostic mammogram on 1/13/22 confirmed the  presence of clustered microcalcifications at the 3 o'clock position of the left breast, biopsy was recommended.  This was completed on 1/14/22 with pathology revealing invasive ductal carcinoma, low grade, ER 98%/VA 80%, HER2 negative.  MRI of the bilateral  breasts was completed on 1/18/22 demonstrating the left 3:00 breast cancer measuring up to 2.1 cm with no additional suspicious findings.  She was referred to Dr. Keita on 1/27/22, she was referred to genetics but ultimately opted to forgo testing and decided on bilateral mastectomy.  Path reviewed, this showed 18 mm of tumor in the left breast with 1 of 3 sentinel nodes positive for carcinoma.  Right breast was benign.  Despite the unexpected positive sentinel node, she still has [id number] disease  with only 1 node involved, so she would be appropriate for Oncotype Dx analysis for risk stratification.  This has returned in the low risk range at 10, because she is over 50 she is squarely within a category of patients who did not benefit from adjuvant chemotherapy.  Therefore, we will recommend antiestrogen therapy alone.  She is surgically post-menopausal, so a prescription was given for anastrozole.  Despite the bilateral mastectomy, I would like her to meet with radiation oncology to discuss adjuvant XRT given the positive node.      She returns today for routine follow up after being switched to Tamoxifen from AI due to intolerance.  Since being on 5mg Tamoxifen, she reports intermittent nausea,

## 2024-11-07 RX ORDER — PREDNISONE 5 MG/1
TABLET ORAL
Qty: 100 TABLET | Refills: 1 | Status: SHIPPED | OUTPATIENT
Start: 2024-11-07

## 2024-11-07 RX ORDER — DESVENLAFAXINE 50 MG/1
50 TABLET, FILM COATED, EXTENDED RELEASE ORAL DAILY
Qty: 90 TABLET | Refills: 1 | Status: SHIPPED | OUTPATIENT
Start: 2024-11-07

## 2024-11-07 RX ORDER — TRAMADOL HYDROCHLORIDE 50 MG/1
50 TABLET ORAL EVERY 8 HOURS PRN
Qty: 90 TABLET | Refills: 2 | Status: SHIPPED | OUTPATIENT
Start: 2024-11-07 | End: 2025-02-05

## 2024-11-07 RX ORDER — ERGOCALCIFEROL 1.25 MG/1
CAPSULE, LIQUID FILLED ORAL
Qty: 16 CAPSULE | Refills: 1 | Status: SHIPPED | OUTPATIENT
Start: 2024-11-07

## 2024-11-07 RX ORDER — GABAPENTIN 300 MG/1
CAPSULE ORAL
Qty: 90 CAPSULE | Refills: 1 | Status: SHIPPED | OUTPATIENT
Start: 2024-11-07 | End: 2025-10-23

## 2024-11-14 NOTE — PERIOP NOTE
Patient verified name and .  Order for consent NOT found in EHR; patient verifies procedure.   Type 2 surgery, phone assessment complete.  Orders NOT received.  Labs per surgeon: No orders received.   Labs per anesthesia protocol: K+ 3.8, Creatinine 0.81, and Hgb 13.8 on 10/31/24; results within anesthesia guidelines.     Patient answered medical/surgical history questions at their best of ability. All prior to admission medications documented in EPIC.    Patient instructed to continue taking all prescription medications up to the day of surgery but to take only the following medications the day of surgery according to anesthesia guidelines with a small sip of water: Tramadol PRN, Ativan PRN, Pristiq, Pepcid.      Patient informed that all vitamins and supplements should be held 7 days prior to surgery and NSAIDS 5 days prior to surgery.     It is okay to take Tylenol.    Patient instructed on the following:    > Arrive at [facility] Entrance, time of arrival to be called the day before by 1700  > NPO after midnight, unless otherwise indicated, including gum, mints, etc.   >**Please drink 32 ounces of non-caffeinated clear liquids, on the day of surgery, 2 hours prior to your arrival time to avoid dehydration.   > Responsible adult must drive patient to the hospital, stay during surgery, and patient will need supervision 24 hours after anesthesia  > Use non moisturizing soap in shower the night before surgery and on the morning of surgery  > All piercings must be removed prior to arrival.    > Leave all valuables (money and jewelry) at home but bring insurance card and ID on DOS.   > You may be required to pay a deductible or co-pay on the day of your procedure. You can pre-pay by calling [phone] if your surgery is at the [facility] or [phone] if your surgery is at the [facility].  > Do not wear deodorant, make-up, nail polish, lotions, cologne, perfumes, powders, or oil on skin. Artificial nails are not

## 2024-11-24 ENCOUNTER — PREP FOR PROCEDURE (OUTPATIENT)
Dept: SURGERY | Age: 54
End: 2024-11-24

## 2024-11-24 RX ORDER — SODIUM CHLORIDE 0.9 % (FLUSH) 0.9 %
5-40 SYRINGE (ML) INJECTION PRN
Status: CANCELLED | OUTPATIENT
Start: 2024-11-24

## 2024-11-24 RX ORDER — SODIUM CHLORIDE 9 MG/ML
INJECTION, SOLUTION INTRAVENOUS PRN
Status: CANCELLED | OUTPATIENT
Start: 2024-11-24

## 2024-11-24 RX ORDER — SODIUM CHLORIDE 0.9 % (FLUSH) 0.9 %
5-40 SYRINGE (ML) INJECTION EVERY 12 HOURS SCHEDULED
Status: CANCELLED | OUTPATIENT
Start: 2024-11-24

## 2024-11-25 ENCOUNTER — ANESTHESIA EVENT (OUTPATIENT)
Dept: SURGERY | Age: 54
End: 2024-11-25
Payer: COMMERCIAL

## 2024-11-26 ENCOUNTER — HOSPITAL ENCOUNTER (OUTPATIENT)
Age: 54
Setting detail: OUTPATIENT SURGERY
Discharge: HOME | End: 2024-11-26
Attending: SURGERY | Admitting: SURGERY
Payer: COMMERCIAL

## 2024-11-26 ENCOUNTER — ANESTHESIA (OUTPATIENT)
Dept: SURGERY | Age: 54
End: 2024-11-26
Payer: COMMERCIAL

## 2024-11-26 VITALS
OXYGEN SATURATION: 92 % | HEART RATE: 106 BPM | TEMPERATURE: 98.1 F | WEIGHT: 200.5 LBS | SYSTOLIC BLOOD PRESSURE: 151 MMHG | BODY MASS INDEX: 35.53 KG/M2 | HEIGHT: 63 IN | RESPIRATION RATE: 15 BRPM | DIASTOLIC BLOOD PRESSURE: 87 MMHG

## 2024-11-26 LAB
GLUCOSE BLD STRIP.AUTO-MCNC: 188 MG/DL (ref 65–100)
SERVICE CMNT-IMP: ABNORMAL

## 2024-11-26 PROCEDURE — 7100000000 HC PACU RECOVERY - FIRST 15 MIN: Performed by: SURGERY

## 2024-11-26 PROCEDURE — 2709999900 HC NON-CHARGEABLE SUPPLY: Performed by: SURGERY

## 2024-11-26 PROCEDURE — 88305 TISSUE EXAM BY PATHOLOGIST: CPT

## 2024-11-26 PROCEDURE — 2580000003 HC RX 258: Performed by: ANESTHESIOLOGY

## 2024-11-26 PROCEDURE — 2580000003 HC RX 258: Performed by: SURGERY

## 2024-11-26 PROCEDURE — 3700000001 HC ADD 15 MINUTES (ANESTHESIA): Performed by: SURGERY

## 2024-11-26 PROCEDURE — 6370000000 HC RX 637 (ALT 250 FOR IP): Performed by: SURGERY

## 2024-11-26 PROCEDURE — 6360000002 HC RX W HCPCS: Performed by: ANESTHESIOLOGY

## 2024-11-26 PROCEDURE — 2720000010 HC SURG SUPPLY STERILE: Performed by: SURGERY

## 2024-11-26 PROCEDURE — 6360000002 HC RX W HCPCS: Performed by: SURGERY

## 2024-11-26 PROCEDURE — 7100000001 HC PACU RECOVERY - ADDTL 15 MIN: Performed by: SURGERY

## 2024-11-26 PROCEDURE — 2500000003 HC RX 250 WO HCPCS: Performed by: NURSE ANESTHETIST, CERTIFIED REGISTERED

## 2024-11-26 PROCEDURE — 82962 GLUCOSE BLOOD TEST: CPT

## 2024-11-26 PROCEDURE — 7100000011 HC PHASE II RECOVERY - ADDTL 15 MIN: Performed by: SURGERY

## 2024-11-26 PROCEDURE — 6370000000 HC RX 637 (ALT 250 FOR IP): Performed by: ANESTHESIOLOGY

## 2024-11-26 PROCEDURE — 6360000002 HC RX W HCPCS: Performed by: NURSE ANESTHETIST, CERTIFIED REGISTERED

## 2024-11-26 PROCEDURE — 7100000010 HC PHASE II RECOVERY - FIRST 15 MIN: Performed by: SURGERY

## 2024-11-26 PROCEDURE — 3600000015 HC SURGERY LEVEL 5 ADDTL 15MIN: Performed by: SURGERY

## 2024-11-26 PROCEDURE — 3600000005 HC SURGERY LEVEL 5 BASE: Performed by: SURGERY

## 2024-11-26 PROCEDURE — 3700000000 HC ANESTHESIA ATTENDED CARE: Performed by: SURGERY

## 2024-11-26 PROCEDURE — C1789 PROSTHESIS, BREAST, IMP: HCPCS | Performed by: SURGERY

## 2024-11-26 DEVICE — SALINE BREAST IMPLANT WITH DIAPHRAGM VALVE, 650CC  SMOOTH ROUND SALINE
Type: IMPLANTABLE DEVICE | Site: BREAST | Status: FUNCTIONAL
Brand: MENTOR SMOOTH ROUND MODERATE PLUS PROFILE

## 2024-11-26 RX ORDER — LIDOCAINE HYDROCHLORIDE AND EPINEPHRINE 10; 10 MG/ML; UG/ML
INJECTION, SOLUTION INFILTRATION; PERINEURAL PRN
Status: DISCONTINUED | OUTPATIENT
Start: 2024-11-26 | End: 2024-11-26 | Stop reason: ALTCHOICE

## 2024-11-26 RX ORDER — ONDANSETRON 2 MG/ML
4 INJECTION INTRAMUSCULAR; INTRAVENOUS
Status: DISCONTINUED | OUTPATIENT
Start: 2024-11-26 | End: 2024-11-26 | Stop reason: HOSPADM

## 2024-11-26 RX ORDER — SODIUM CHLORIDE 0.9 % (FLUSH) 0.9 %
5-40 SYRINGE (ML) INJECTION PRN
Status: DISCONTINUED | OUTPATIENT
Start: 2024-11-26 | End: 2024-11-26 | Stop reason: HOSPADM

## 2024-11-26 RX ORDER — DEXAMETHASONE SODIUM PHOSPHATE 10 MG/ML
INJECTION INTRAMUSCULAR; INTRAVENOUS
Status: DISCONTINUED | OUTPATIENT
Start: 2024-11-26 | End: 2024-11-26 | Stop reason: SDUPTHER

## 2024-11-26 RX ORDER — GINSENG 100 MG
CAPSULE ORAL PRN
Status: DISCONTINUED | OUTPATIENT
Start: 2024-11-26 | End: 2024-11-26 | Stop reason: ALTCHOICE

## 2024-11-26 RX ORDER — DIPHENHYDRAMINE HYDROCHLORIDE 50 MG/ML
12.5 INJECTION INTRAMUSCULAR; INTRAVENOUS
Status: DISCONTINUED | OUTPATIENT
Start: 2024-11-26 | End: 2024-11-26 | Stop reason: HOSPADM

## 2024-11-26 RX ORDER — SODIUM CHLORIDE 0.9 % (FLUSH) 0.9 %
5-40 SYRINGE (ML) INJECTION EVERY 12 HOURS SCHEDULED
Status: DISCONTINUED | OUTPATIENT
Start: 2024-11-26 | End: 2024-11-26 | Stop reason: HOSPADM

## 2024-11-26 RX ORDER — KETAMINE HCL IN NACL, ISO-OSM 20 MG/2 ML
SYRINGE (ML) INJECTION
Status: DISCONTINUED | OUTPATIENT
Start: 2024-11-26 | End: 2024-11-26 | Stop reason: SDUPTHER

## 2024-11-26 RX ORDER — SODIUM CHLORIDE 9 MG/ML
INJECTION, SOLUTION INTRAVENOUS PRN
Status: DISCONTINUED | OUTPATIENT
Start: 2024-11-26 | End: 2024-11-26 | Stop reason: HOSPADM

## 2024-11-26 RX ORDER — OXYCODONE HYDROCHLORIDE 5 MG/1
5 TABLET ORAL
Status: COMPLETED | OUTPATIENT
Start: 2024-11-26 | End: 2024-11-26

## 2024-11-26 RX ORDER — FENTANYL CITRATE 50 UG/ML
INJECTION, SOLUTION INTRAMUSCULAR; INTRAVENOUS
Status: DISCONTINUED | OUTPATIENT
Start: 2024-11-26 | End: 2024-11-26 | Stop reason: SDUPTHER

## 2024-11-26 RX ORDER — MIDAZOLAM HYDROCHLORIDE 2 MG/2ML
2 INJECTION, SOLUTION INTRAMUSCULAR; INTRAVENOUS
Status: DISCONTINUED | OUTPATIENT
Start: 2024-11-26 | End: 2024-11-26 | Stop reason: HOSPADM

## 2024-11-26 RX ORDER — ACETAMINOPHEN 500 MG
1000 TABLET ORAL ONCE
Status: COMPLETED | OUTPATIENT
Start: 2024-11-26 | End: 2024-11-26

## 2024-11-26 RX ORDER — FENTANYL CITRATE 50 UG/ML
100 INJECTION, SOLUTION INTRAMUSCULAR; INTRAVENOUS
Status: DISCONTINUED | OUTPATIENT
Start: 2024-11-26 | End: 2024-11-26 | Stop reason: HOSPADM

## 2024-11-26 RX ORDER — VANCOMYCIN HYDROCHLORIDE 1 G/20ML
INJECTION, POWDER, LYOPHILIZED, FOR SOLUTION INTRAVENOUS PRN
Status: DISCONTINUED | OUTPATIENT
Start: 2024-11-26 | End: 2024-11-26 | Stop reason: ALTCHOICE

## 2024-11-26 RX ORDER — ONDANSETRON 2 MG/ML
INJECTION INTRAMUSCULAR; INTRAVENOUS
Status: DISCONTINUED | OUTPATIENT
Start: 2024-11-26 | End: 2024-11-26 | Stop reason: SDUPTHER

## 2024-11-26 RX ORDER — PROPOFOL 10 MG/ML
INJECTION, EMULSION INTRAVENOUS
Status: DISCONTINUED | OUTPATIENT
Start: 2024-11-26 | End: 2024-11-26 | Stop reason: SDUPTHER

## 2024-11-26 RX ORDER — BUPIVACAINE HYDROCHLORIDE 2.5 MG/ML
INJECTION, SOLUTION EPIDURAL; INFILTRATION; INTRACAUDAL PRN
Status: DISCONTINUED | OUTPATIENT
Start: 2024-11-26 | End: 2024-11-26 | Stop reason: ALTCHOICE

## 2024-11-26 RX ORDER — HEPARIN SODIUM 5000 [USP'U]/ML
5000 INJECTION, SOLUTION INTRAVENOUS; SUBCUTANEOUS ONCE
Status: COMPLETED | OUTPATIENT
Start: 2024-11-26 | End: 2024-11-26

## 2024-11-26 RX ORDER — SCOLOPAMINE TRANSDERMAL SYSTEM 1 MG/1
1 PATCH, EXTENDED RELEASE TRANSDERMAL ONCE
COMMUNITY

## 2024-11-26 RX ORDER — SCOLOPAMINE TRANSDERMAL SYSTEM 1 MG/1
1 PATCH, EXTENDED RELEASE TRANSDERMAL ONCE
Status: DISCONTINUED | OUTPATIENT
Start: 2024-11-26 | End: 2024-11-26 | Stop reason: HOSPADM

## 2024-11-26 RX ORDER — NALOXONE HYDROCHLORIDE 0.4 MG/ML
INJECTION, SOLUTION INTRAMUSCULAR; INTRAVENOUS; SUBCUTANEOUS PRN
Status: DISCONTINUED | OUTPATIENT
Start: 2024-11-26 | End: 2024-11-26 | Stop reason: HOSPADM

## 2024-11-26 RX ORDER — ROCURONIUM BROMIDE 10 MG/ML
INJECTION, SOLUTION INTRAVENOUS
Status: DISCONTINUED | OUTPATIENT
Start: 2024-11-26 | End: 2024-11-26 | Stop reason: SDUPTHER

## 2024-11-26 RX ORDER — LIDOCAINE HYDROCHLORIDE 20 MG/ML
INJECTION, SOLUTION EPIDURAL; INFILTRATION; INTRACAUDAL; PERINEURAL
Status: DISCONTINUED | OUTPATIENT
Start: 2024-11-26 | End: 2024-11-26 | Stop reason: SDUPTHER

## 2024-11-26 RX ORDER — MIDAZOLAM HYDROCHLORIDE 1 MG/ML
INJECTION, SOLUTION INTRAMUSCULAR; INTRAVENOUS
Status: DISCONTINUED | OUTPATIENT
Start: 2024-11-26 | End: 2024-11-26 | Stop reason: SDUPTHER

## 2024-11-26 RX ORDER — HYDROMORPHONE HYDROCHLORIDE 2 MG/ML
INJECTION, SOLUTION INTRAMUSCULAR; INTRAVENOUS; SUBCUTANEOUS
Status: DISCONTINUED | OUTPATIENT
Start: 2024-11-26 | End: 2024-11-26 | Stop reason: SDUPTHER

## 2024-11-26 RX ORDER — GENTAMICIN SULFATE 80 MG/100ML
INJECTION, SOLUTION INTRAVENOUS CONTINUOUS PRN
Status: COMPLETED | OUTPATIENT
Start: 2024-11-26 | End: 2024-11-26

## 2024-11-26 RX ORDER — SODIUM CHLORIDE, SODIUM LACTATE, POTASSIUM CHLORIDE, CALCIUM CHLORIDE 600; 310; 30; 20 MG/100ML; MG/100ML; MG/100ML; MG/100ML
INJECTION, SOLUTION INTRAVENOUS CONTINUOUS
Status: DISCONTINUED | OUTPATIENT
Start: 2024-11-26 | End: 2024-11-26 | Stop reason: HOSPADM

## 2024-11-26 RX ADMIN — ONDANSETRON 4 MG: 2 INJECTION, SOLUTION INTRAMUSCULAR; INTRAVENOUS at 13:24

## 2024-11-26 RX ADMIN — SUGAMMADEX 200 MG: 100 INJECTION, SOLUTION INTRAVENOUS at 17:07

## 2024-11-26 RX ADMIN — Medication 20 MG: at 13:14

## 2024-11-26 RX ADMIN — ROCURONIUM BROMIDE 50 MG: 10 INJECTION, SOLUTION INTRAVENOUS at 13:15

## 2024-11-26 RX ADMIN — LIDOCAINE HYDROCHLORIDE 100 MG: 20 INJECTION, SOLUTION EPIDURAL; INFILTRATION; INTRACAUDAL; PERINEURAL at 13:14

## 2024-11-26 RX ADMIN — PROPOFOL 180 MCG/KG/MIN: 10 INJECTION, EMULSION INTRAVENOUS at 13:16

## 2024-11-26 RX ADMIN — HYDROMORPHONE HYDROCHLORIDE 0.5 MG: 1 INJECTION, SOLUTION INTRAMUSCULAR; INTRAVENOUS; SUBCUTANEOUS at 18:07

## 2024-11-26 RX ADMIN — DEXAMETHASONE SODIUM PHOSPHATE 10 MG: 10 INJECTION INTRAMUSCULAR; INTRAVENOUS at 13:24

## 2024-11-26 RX ADMIN — HYDROMORPHONE HYDROCHLORIDE 0.5 MG: 2 INJECTION INTRAMUSCULAR; INTRAVENOUS; SUBCUTANEOUS at 13:52

## 2024-11-26 RX ADMIN — OXYCODONE 5 MG: 5 TABLET ORAL at 18:45

## 2024-11-26 RX ADMIN — CEFAZOLIN 2000 MG: 2 INJECTION, POWDER, FOR SOLUTION INTRAMUSCULAR; INTRAVENOUS at 13:21

## 2024-11-26 RX ADMIN — FENTANYL CITRATE 50 MCG: 50 INJECTION, SOLUTION INTRAMUSCULAR; INTRAVENOUS at 13:14

## 2024-11-26 RX ADMIN — FENTANYL CITRATE 50 MCG: 50 INJECTION, SOLUTION INTRAMUSCULAR; INTRAVENOUS at 13:38

## 2024-11-26 RX ADMIN — SODIUM CHLORIDE, SODIUM LACTATE, POTASSIUM CHLORIDE, AND CALCIUM CHLORIDE: 600; 310; 30; 20 INJECTION, SOLUTION INTRAVENOUS at 13:06

## 2024-11-26 RX ADMIN — HYDROMORPHONE HYDROCHLORIDE 0.5 MG: 2 INJECTION INTRAMUSCULAR; INTRAVENOUS; SUBCUTANEOUS at 14:15

## 2024-11-26 RX ADMIN — PROPOFOL 160 MG: 10 INJECTION, EMULSION INTRAVENOUS at 13:14

## 2024-11-26 RX ADMIN — SODIUM CHLORIDE, PRESERVATIVE FREE 5 ML: 5 INJECTION INTRAVENOUS at 11:37

## 2024-11-26 RX ADMIN — HYDROMORPHONE HYDROCHLORIDE 1 MG: 2 INJECTION INTRAMUSCULAR; INTRAVENOUS; SUBCUTANEOUS at 14:48

## 2024-11-26 RX ADMIN — ACETAMINOPHEN 1000 MG: 500 TABLET, FILM COATED ORAL at 11:23

## 2024-11-26 RX ADMIN — MIDAZOLAM 2 MG: 1 INJECTION INTRAMUSCULAR; INTRAVENOUS at 13:10

## 2024-11-26 RX ADMIN — HEPARIN SODIUM 5000 UNITS: 5000 INJECTION INTRAVENOUS; SUBCUTANEOUS at 11:23

## 2024-11-26 ASSESSMENT — PAIN DESCRIPTION - ONSET: ONSET: PROGRESSIVE

## 2024-11-26 ASSESSMENT — PAIN SCALES - GENERAL
PAINLEVEL_OUTOF10: 7
PAINLEVEL_OUTOF10: 7
PAINLEVEL_OUTOF10: 5

## 2024-11-26 ASSESSMENT — PAIN DESCRIPTION - DESCRIPTORS
DESCRIPTORS: PATIENT UNABLE TO DESCRIBE
DESCRIPTORS: PATIENT UNABLE TO DESCRIBE

## 2024-11-26 ASSESSMENT — PAIN DESCRIPTION - LOCATION
LOCATION: ABDOMEN

## 2024-11-26 ASSESSMENT — PAIN DESCRIPTION - PAIN TYPE
TYPE: SURGICAL PAIN
TYPE: SURGICAL PAIN

## 2024-11-26 ASSESSMENT — PAIN DESCRIPTION - FREQUENCY: FREQUENCY: CONTINUOUS

## 2024-11-26 ASSESSMENT — PAIN DESCRIPTION - ORIENTATION
ORIENTATION: ANTERIOR

## 2024-11-26 ASSESSMENT — PAIN - FUNCTIONAL ASSESSMENT: PAIN_FUNCTIONAL_ASSESSMENT: 0-10

## 2024-11-26 NOTE — ANESTHESIA POSTPROCEDURE EVALUATION
Department of Anesthesiology  Postprocedure Note    Patient: Camille Mcintosh  MRN: [id number]  YOB: 1970  Date of evaluation: 11/26/2024    Procedure Summary       Date: 11/26/24 Room / Location: [facility] OR 04 / [facility]    Anesthesia Start: 1306 Anesthesia Stop: 1732    Procedures:       BILATERAL BREAST RECONSTRUCTION  REVISION (Bilateral: Breast)      BILATERAL FAT GRAFTING, SOFT TISSUE REVISION (Bilateral: Breast)      BILATERAL IMPLANT EXCHANGE (Bilateral: Breast) Diagnosis:       Personal history of malignant neoplasm of breast      Deformity of reconstructed breast      Disproportion of reconstructed breast      Personal history of irradiation      Acquired absence of both breasts and nipples      (Personal history of malignant neoplasm of breast [Z85.3])      (Deformity of reconstructed breast [N65.0])      (Disproportion of reconstructed breast [N65.1])      (Personal history of irradiation [Z92.3])      (Acquired absence of both breasts and nipples [Z90.13])    Surgeons: Gerry Mcdowell MD Responsible Provider: Collin Marte Jr., MD    Anesthesia Type: TIVA, general ASA Status: 2            Anesthesia Type: No value filed.    Serenity Phase I:      Serenity Phase II:      Anesthesia Post Evaluation    Patient location during evaluation: PACU  Patient participation: complete - patient participated  Level of consciousness: awake and alert  Pain score: 2  Airway patency: patent  Nausea & Vomiting: no nausea and no vomiting  Cardiovascular status: blood pressure returned to baseline  Respiratory status: acceptable  Hydration status: euvolemic  Comments: BP (!) 125/102   Pulse 100   Temp 97.6 °F (36.4 °C) (Infrared)   Resp 16   Ht 1.6 m (5' 2.99\")   Wt 90.9 kg (200 lb 8 oz)   SpO2 94%   BMI 35.53 kg/m²   Patient has a tremor on both hands R>L. Good muscle strength. Spoke with Dr Quesada. We decided to remove the scope patch and check her glucose (183). Discussed with family at bedside and

## 2024-11-26 NOTE — H&P
Plastic Surgery H&P    HPI: 54yoF with h/o breast cancer, s/p bilateral breast reconstruction.  She presents today for bilateral breast reconstruction revision. She understands the plan along with treatment options, risks, and benefits.  She wishes to proceed. She denies any recent illness, infection, or changes to her medications since her most recent preop visit.      Past Medical History:   Diagnosis Date    Agatston coronary artery calcium score less than 100 2018    CAC 0    Cancer (HCC)     Depression with anxiety     managed with medication    Essential hypertension     managed with medication    Family history of breast cancer     H/O right breast biopsy 2014, 2014    Low back pain     Menopause     Obesity, Class I, BMI 30-34.9     PUD (peptic ulcer disease)     Pulmonary nodules 2018    3 mm right upper lobe nodule and 2 mm left upper lobe nodule. Per pt no growth.     Sacral pain      Past Surgical History:   Procedure Laterality Date    BREAST BIOPSY Left 2022    stereo bx     BREAST BIOPSY  2014    RIGHT BREAST NEEDLE LOCALIZED BIOPSY X2    performed by Kirk Ramos MD at [facility]    BREAST BIOPSY  2014    BREAST RECONSTRUCTION Bilateral 2022    BIALTERAL BREAST RECONSTRUCTION STAGE 1 WITH TISSUE EXPANDERS AND ADM performed by Gerry Mcdowell MD at [facility] OR    BREAST RECONSTRUCTION Bilateral 2022    BREAST RECONSTRUCTION Bilateral 2023    BILATERAL BREAST RECONSTRUCTION REVISION performed by Gerry Mcdowell MD at [facility] OR     SECTION      COLONOSCOPY N/A 2019    COLONOSCOPY/ 27 performed by Jordan Maier DO at [facility]OSCOPY    ENDOMETRIAL ABLATION  2018    FAT TRANSFER Bilateral 2023    BILATERAL FAT GRAFTING, SOFT TISSUE REVISION performed by Gerry Mcdowell MD at [facility] OR    HYSTERECTOMY, VAGINAL  2021    [facility] BREAST BIOPSY LEFT Left 2022    [facility] BREAST BIOPSY LEFT 2022  [facility]    MASTECTOMY Bilateral 4/7/2022    BILATERAL BREAST MASTECTOMY performed by Fawad Keita MD at [facility]    JOSÉ MIGUEL AND BSO (CERVIX REMOVED)  08/18/2021    full hysterectomy    TUBAL LIGATION  1999    US GUIDED CORE BREAST BIOPSY Right 6/5/2014    7:00 pisition: Fibrocystic mastopathy having moderate intraductal hyperplasia, apocrine metaplasia and microcalcification.    US GUIDED CORE BREAST BIOPSY Right 6/5/2014    6:00 position Fragments of fibroadenoma, simple     No current facility-administered medications on file prior to encounter.     Current Outpatient Medications on File Prior to Encounter   Medication Sig Dispense Refill    scopolamine (TRANSDERM-SCOP) transdermal patch Place 1 patch onto the skin once      tamoxifen (NOLVADEX) 10 MG tablet Take 1 tablet by mouth daily OK to start with 1/2 tablet daily until tolerance established (Patient taking differently: Take 1 tablet by mouth at bedtime OK to start with 1/2 tablet daily until tolerance established) 30 tablet 11    famotidine (PEPCID) 40 MG tablet Take 1 tablet by mouth as needed (GERD) 30 tablet 5    linaCLOtide (LINZESS) 72 MCG CAPS capsule Take 1 capsule by mouth every morning (before breakfast) 30 capsule 5    LORazepam (ATIVAN) 1 MG tablet Take 1 tablet by mouth daily as needed.       No Known Allergies    Family History   Problem Relation Age of Onset    Heart Attack Father     Heart Disease Father     Diabetes Father     Breast Cancer Maternal Grandmother 82    Stroke Maternal Grandmother     Breast Cancer Mother 72    Cancer Mother     Diabetes Mother     Breast Cancer Maternal Aunt 62    Hypertension Brother     Breast Cancer Maternal Aunt     Cancer Paternal Uncle     Heart Attack Paternal Uncle     Heart Disease Paternal Uncle     High Blood Pressure Paternal Uncle     Kidney Disease Paternal Uncle     Stroke Paternal Uncle     Diabetes Sister     High Blood Pressure Sister     Diabetes Brother     High Blood

## 2024-11-26 NOTE — DISCHARGE INSTRUCTIONS
ACTIVITY  As tolerated and as directed by your doctor.   Bathe or shower as directed by your doctor.   Leave dressing/bra in place until follow up appt.   DIET  Clear liquids until no nausea or vomiting; then light diet for the first day.  Advance to regular diet on second day, unless your doctor orders otherwise.   If nausea and vomiting continues, call your doctor.     PAIN  Take pain medication as directed by your doctor.   Call your doctor if pain is NOT relieved by medication.   DO NOT take aspirin of blood thinners unless directed by your doctor.     MEDICATION INTERACTION:During your procedure you potentially received a medication or medications which may reduce the effectiveness of oral contraceptives. Please consider other forms of contraception for 1 month following your procedure if you are currently using oral contraceptives as your primary form of birth control. In addition to this, we recommend continuing your oral contraceptive as prescribed, unless otherwise instructed by your physician, during this time      CALL YOUR DOCTOR IF   Excessive bleeding that does not stop after holding pressure over the area  Temperature of 101 degrees F or above  Excessive redness, swelling or bruising, and/ or green or yellow, smelly discharge from incision    After general anesthesia or intravenous sedation, for 24 hours or while taking prescription Narcotics:  Limit your activities  A responsible adult needs to be with you for the next 24 hours  Do not drive and operate hazardous machinery  Do not make important personal or business decisions  Do not drink alcoholic beverages  If you have not urinated within 8 hours after discharge, and you are experiencing discomfort from urinary retention, please go to the nearest ED.  If you have sleep apnea and have a CPAP machine, please use it for all naps and sleeping.  Please use caution when taking narcotics and any of your home medications that may cause drowsiness.  *

## 2024-11-26 NOTE — ANESTHESIA PRE PROCEDURE
LABGLOM 85 10/31/2024 11:45 AM    LABGLOM >60 10/04/2023 09:35 AM    GLUCOSE 106 10/31/2024 11:45 AM    CALCIUM 9.8 10/31/2024 11:45 AM    BILITOT 0.3 10/31/2024 11:45 AM    ALKPHOS 118 10/31/2024 11:45 AM    ALKPHOS 106 04/29/2022 03:10 PM    AST 31 10/31/2024 11:45 AM    ALT 37 10/31/2024 11:45 AM       POC Tests: No results for input(s): \"POCGLU\", \"POCNA\", \"POCK\", \"POCCL\", \"POCBUN\", \"POCHEMO\", \"POCHCT\" in the last 72 hours.    Coags: No results found for: \"PROTIME\", \"INR\", \"APTT\"    HCG (If Applicable): No results found for: \"PREGTESTUR\", \"PREGSERUM\", \"HCG\", \"HCGQUANT\"     ABGs: No results found for: \"PHART\", \"PO2ART\", \"MVA2ABO\", \"CPZ1UTD\", \"BEART\", \"W2DVOWVI\"     Type & Screen (If Applicable):  No results found for: \"ABORH\", \"LABANTI\"    Drug/Infectious Status (If Applicable):  Lab Results   Component Value Date/Time    HEPCAB 0.1 12/14/2020 12:38 PM       COVID-19 Screening (If Applicable): No results found for: \"COVID19\"        Anesthesia Evaluation  Patient summary reviewed and Nursing notes reviewed   history of anesthetic complications: PONV.  Airway: Mallampati: II  TM distance: >3 FB   Neck ROM: full  Mouth opening: > = 3 FB   Dental: normal exam         Pulmonary:Negative Pulmonary ROS and normal exam                              ROS comment: Former smoker   Cardiovascular:  Exercise tolerance: good (>4 METS)  (+) hypertension:        Rhythm: regular  Rate: normal                    Neuro/Psych:   (+) psychiatric history:depression/anxiety             GI/Hepatic/Renal:   (+) GERD: well controlled          Endo/Other: Negative Endo/Other ROS                    Abdominal: normal exam            Vascular: negative vascular ROS.         Other Findings:       Anesthesia Plan      TIVA and general     ASA 2     (Plan TIVA, general anesthetic  Prior anesthetic- MAC 3, grade I view. )  Induction: intravenous.    MIPS: Postoperative opioids intended and Prophylactic antiemetics administered.  Anesthetic plan and

## 2024-11-27 NOTE — OP NOTE
Operative Note      Patient: Camille Mcintosh  YOB: 1970  MRN: [id number]    Date of Procedure: 11/26/2024    Pre-Op Diagnosis Codes:      * Personal history of malignant neoplasm of breast [Z85.3]     * Deformity of reconstructed breast [N65.0]     * Disproportion of reconstructed breast [N65.1]     * Personal history of irradiation [Z92.3]     * Acquired absence of both breasts and nipples [Z90.13]    Post-Op Diagnosis: Same     Surgeon(s) and Role:      * Gerry Mcdowell MD - Primary     Anesthesia: General      Procedure:   1. EXCHANGE OF BILATERAL BREAST IMPLANTS FOR RECONSTRUCTION REVISION.  3. BILATERAL BREAST FAT GRAFTING- 90cc.  4. BILATERAL BREAST SOFT TISSUE REVISIONS.  5. BILATERAL BREAST CAPSULOTOMIES TO IMPROVE POCKET CONTOUR/SYMMETRY.      Procedure in Detail:   The patient was seen in the preoperative area and the standard breast markings were performed.  The surgical plan was again reviewed with the patient and her .  They are in agreement to remove nipples as part of today's revision to allow for greater skin resection and recontouring.  They also understood that gel or saline implants may be required depending on pocket dimensions and intraoperative assessment.  She expressed her desire again today to be larger than she is currently and wanted to maximize upper pole fullness.  All surgical options, risks, and benefits have been discussed and questions addressed.  Written and verbal consent were confirmed.     She was taken to the operating room and placed in a supine position with her arms abducted and secured.  Joints were well padded.  SCDs were on and running.  She was placed under general anesthesia.  Timeout was performed per protocol.  Her  chest and abdomen/flanks were widely prepped and draped in the usual sterile fashion.      First, breast incision was made through the lateral aspect of the existing scar- bilaterally. Dissection was carried down through the capsule  incisions were dressed with ointment and Xeroform.  Fluff gauze and ABD pads were then applied followed by a surgical bra.       There were no complications.  The patient tolerated the procedures well.      Estimated Blood Loss:  40cc      Drains: right breast pocket- 15Fr bulb drain.                 Complications: None      Counts: Sponge and needle counts were correct times two.     Specimens:   ID Type Source Tests Collected by Time Destination   A : right mastectomy tissue Tissue Tissue SURGICAL PATHOLOGY Gerry Mcdowell MD 11/26/2024 1528    B : left mastectomy tissue Tissue Tissue SURGICAL PATHOLOGY Gerry Mcdowell MD 11/26/2024 1532      Implants:  Implant Name Type Inv. Item Serial No.  Lot No. LRB No. Used Action   IMPLANT BRST [id number] 144CM P56 65CM 650 780CC MONI NACL FILL - [id number]  IMPLANT BRST [id number] 144CM P56 65CM 650 780CC [employer] FILL [id number] [employer] [id number] Left 1 Implanted   IMPLANT BRST [id number] 144CM P56 65CM 650 780CC [employer] NACL FILL - [id number]  IMPLANT BRST [id number] 144CM P56 65CM 650 780CC [employer] NAC[employer] FILL [id number] [employer] [id number] Right 1 Implanted         Electronically signed by Gerry Mcdowell MD on 11/27/2024 at 8:09 AM

## 2025-01-24 DIAGNOSIS — K21.9 GASTROESOPHAGEAL REFLUX DISEASE WITHOUT ESOPHAGITIS: Primary | ICD-10-CM

## 2025-01-24 RX ORDER — FAMOTIDINE 40 MG/1
40 TABLET, FILM COATED ORAL PRN
Qty: 30 TABLET | Refills: 5 | Status: SHIPPED | OUTPATIENT
Start: 2025-01-24

## 2025-01-24 RX ORDER — FAMOTIDINE 40 MG/1
TABLET, FILM COATED ORAL
Qty: 30 TABLET | Refills: 5 | OUTPATIENT
Start: 2025-01-24

## 2025-02-05 ENCOUNTER — OFFICE VISIT (OUTPATIENT)
Age: 55
End: 2025-02-05
Payer: COMMERCIAL

## 2025-02-05 ENCOUNTER — PREP FOR PROCEDURE (OUTPATIENT)
Dept: GASTROENTEROLOGY | Age: 55
End: 2025-02-05

## 2025-02-05 VITALS
RESPIRATION RATE: 96 BRPM | WEIGHT: 200.4 LBS | SYSTOLIC BLOOD PRESSURE: 130 MMHG | DIASTOLIC BLOOD PRESSURE: 85 MMHG | HEART RATE: 74 BPM | BODY MASS INDEX: 35.51 KG/M2

## 2025-02-05 DIAGNOSIS — K59.09 OTHER CONSTIPATION: ICD-10-CM

## 2025-02-05 DIAGNOSIS — K58.9 IRRITABLE BOWEL SYNDROME, UNSPECIFIED TYPE: ICD-10-CM

## 2025-02-05 DIAGNOSIS — R11.0 NAUSEA: ICD-10-CM

## 2025-02-05 DIAGNOSIS — K58.1 IRRITABLE BOWEL SYNDROME WITH CONSTIPATION: ICD-10-CM

## 2025-02-05 DIAGNOSIS — R13.10 DYSPHAGIA, UNSPECIFIED TYPE: Primary | ICD-10-CM

## 2025-02-05 PROCEDURE — 3075F SYST BP GE 130 - 139MM HG: CPT | Performed by: INTERNAL MEDICINE

## 2025-02-05 PROCEDURE — 99204 OFFICE O/P NEW MOD 45 MIN: CPT | Performed by: INTERNAL MEDICINE

## 2025-02-05 PROCEDURE — 3079F DIAST BP 80-89 MM HG: CPT | Performed by: INTERNAL MEDICINE

## 2025-02-05 RX ORDER — ZAFIRLUKAST 20 MG/1
20 TABLET, FILM COATED ORAL 2 TIMES DAILY
COMMUNITY
Start: 2024-11-29

## 2025-02-05 RX ORDER — SODIUM CHLORIDE 0.9 % (FLUSH) 0.9 %
5-40 SYRINGE (ML) INJECTION PRN
Status: CANCELLED | OUTPATIENT
Start: 2025-02-05

## 2025-02-05 RX ORDER — PANTOPRAZOLE SODIUM 40 MG/1
40 TABLET, DELAYED RELEASE ORAL
Qty: 90 TABLET | Refills: 1 | Status: SHIPPED | OUTPATIENT
Start: 2025-02-05

## 2025-02-05 RX ORDER — SODIUM CHLORIDE 9 MG/ML
25 INJECTION, SOLUTION INTRAVENOUS PRN
Status: CANCELLED | OUTPATIENT
Start: 2025-02-05

## 2025-02-05 RX ORDER — SODIUM CHLORIDE 0.9 % (FLUSH) 0.9 %
5-40 SYRINGE (ML) INJECTION EVERY 12 HOURS SCHEDULED
Status: CANCELLED | OUTPATIENT
Start: 2025-02-05

## 2025-02-05 NOTE — PROGRESS NOTES
GASTROENTEROLOGY CLINIC VISIT    CC: Dysphagia     HPI:   Camille Mcintosh is 54 y.o. y/o female  h/o breast cancer s/p mastectomy and radiation therapy. Now presenting with complaint of nausea and dysphagia.She endorses difficulty with liquids and solids for a few months. She is also endorsing nausea and occasional vomiting. She feels nauseous throughout the day. She has not noticed anything that helps with symptoms. She continues to have constipation, has bowel movements every 3 days. Taking Linzess 72 mcg daily. She endorses history of acid reflux but does not take anything for it. EGD in 2019, report reviewed overall normal exam.       PE:   Vitals:    02/05/25 1500   BP: 130/85   Pulse: 74   Resp: (!) 96      General:  The patient appears well-nourished, and is in no acute distress.    Respiratory: Respiratory effort is normal.   Cardiovascular:  Regular rate and rhythm.     Abdomen:  Soft, non tender to palpation. No distention.   Neurologic:  Alert and oriented x3.        Labs:  Lab Results   Component Value Date    HGB 13.8 10/31/2024    WBC 6.1 10/31/2024     10/31/2024    MCV 89.3 10/31/2024    IRON 78 08/02/2023    FERRITIN 122 08/02/2023    TIBC 388 08/02/2023    CREATININE 0.81 10/31/2024    ALT 37 10/31/2024    AST 31 10/31/2024       Assessment/Plan:   Dysphagia   Nausea   Fatty liver disease  Elevated LFT   Constipation   Acid reflux disease     - Schedule for EGD   - Increase Linzess to 135 mcg daily   - Protonix 40 mg daily   - RTC after procedure       Barbara Cochran MD  [facility] Gastroenterology

## 2025-02-07 NOTE — PERIOP NOTE
Patient verified name, , and procedure.    Type: 1a; abbreviated assessment per anesthesia guidelines  Labs per surgeon: none  Labs per anesthesia: none      Instructed pt that they will be notified by the Gi Lab for time of arrival. If any questions please call the GI lab at [phone].     Follow diet and prep instructions per office. May have clear liquids until 4 hours prior to time of arrival. Pt instructed to drink 32 ounces of non-caffeinated clear liquids 2 hours prior to arrival to avoid dehydration.      Bath or shower the night before and the am of surgery with antibacterial soap. No lotions, oils, powders, cologne on skin. No make up, eye make up or jewelry. Wear loose fitting comfortable, clean clothing.     Must have adult present in building the entire time.     Medications for the day of procedure- ativan if needed, accolate, pristiq, pepcid.    The following discharge instructions reviewed with patient: medication given during procedure may cause drowsiness for several hours, therefore, do not drive or operate machinery for remainder of the day, no alcohol on the day of your procedure, resume regular diet and activity unless otherwise directed, for mild sore throat you may use Cepacol throat lozenges or warm salt water gargles as needed, call your physician for any problems or questions. Patient verbalizes understanding.

## 2025-02-09 SDOH — ECONOMIC STABILITY: FOOD INSECURITY: WITHIN THE PAST 12 MONTHS, THE FOOD YOU BOUGHT JUST DIDN'T LAST AND YOU DIDN'T HAVE MONEY TO GET MORE.: NEVER TRUE

## 2025-02-09 SDOH — ECONOMIC STABILITY: TRANSPORTATION INSECURITY
IN THE PAST 12 MONTHS, HAS THE LACK OF TRANSPORTATION KEPT YOU FROM MEDICAL APPOINTMENTS OR FROM GETTING MEDICATIONS?: NO

## 2025-02-09 SDOH — ECONOMIC STABILITY: INCOME INSECURITY: IN THE LAST 12 MONTHS, WAS THERE A TIME WHEN YOU WERE NOT ABLE TO PAY THE MORTGAGE OR RENT ON TIME?: NO

## 2025-02-09 SDOH — ECONOMIC STABILITY: FOOD INSECURITY: WITHIN THE PAST 12 MONTHS, YOU WORRIED THAT YOUR FOOD WOULD RUN OUT BEFORE YOU GOT MONEY TO BUY MORE.: NEVER TRUE

## 2025-02-09 ASSESSMENT — PATIENT HEALTH QUESTIONNAIRE - PHQ9
10. IF YOU CHECKED OFF ANY PROBLEMS, HOW DIFFICULT HAVE THESE PROBLEMS MADE IT FOR YOU TO DO YOUR WORK, TAKE CARE OF THINGS AT HOME, OR GET ALONG WITH OTHER PEOPLE: NOT DIFFICULT AT ALL
1. LITTLE INTEREST OR PLEASURE IN DOING THINGS: NOT AT ALL
4. FEELING TIRED OR HAVING LITTLE ENERGY: SEVERAL DAYS
5. POOR APPETITE OR OVEREATING: NOT AT ALL
SUM OF ALL RESPONSES TO PHQ QUESTIONS 1-9: 3
3. TROUBLE FALLING OR STAYING ASLEEP: SEVERAL DAYS
SUM OF ALL RESPONSES TO PHQ QUESTIONS 1-9: 3
SUM OF ALL RESPONSES TO PHQ QUESTIONS 1-9: 3
4. FEELING TIRED OR HAVING LITTLE ENERGY: SEVERAL DAYS
2. FEELING DOWN, DEPRESSED OR HOPELESS: NOT AT ALL
7. TROUBLE CONCENTRATING ON THINGS, SUCH AS READING THE NEWSPAPER OR WATCHING TELEVISION: SEVERAL DAYS
8. MOVING OR SPEAKING SO SLOWLY THAT OTHER PEOPLE COULD HAVE NOTICED. OR THE OPPOSITE, BEING SO FIGETY OR RESTLESS THAT YOU HAVE BEEN MOVING AROUND A LOT MORE THAN USUAL: NOT AT ALL
5. POOR APPETITE OR OVEREATING: NOT AT ALL
10. IF YOU CHECKED OFF ANY PROBLEMS, HOW DIFFICULT HAVE THESE PROBLEMS MADE IT FOR YOU TO DO YOUR WORK, TAKE CARE OF THINGS AT HOME, OR GET ALONG WITH OTHER PEOPLE: NOT DIFFICULT AT ALL
SUM OF ALL RESPONSES TO PHQ QUESTIONS 1-9: 3
SUM OF ALL RESPONSES TO PHQ QUESTIONS 1-9: 3
8. MOVING OR SPEAKING SO SLOWLY THAT OTHER PEOPLE COULD HAVE NOTICED. OR THE OPPOSITE - BEING SO FIDGETY OR RESTLESS THAT YOU HAVE BEEN MOVING AROUND A LOT MORE THAN USUAL: NOT AT ALL
1. LITTLE INTEREST OR PLEASURE IN DOING THINGS: NOT AT ALL
7. TROUBLE CONCENTRATING ON THINGS, SUCH AS READING THE NEWSPAPER OR WATCHING TELEVISION: SEVERAL DAYS
9. THOUGHTS THAT YOU WOULD BE BETTER OFF DEAD, OR OF HURTING YOURSELF: NOT AT ALL
SUM OF ALL RESPONSES TO PHQ9 QUESTIONS 1 & 2: 0
9. THOUGHTS THAT YOU WOULD BE BETTER OFF DEAD, OR OF HURTING YOURSELF: NOT AT ALL
2. FEELING DOWN, DEPRESSED OR HOPELESS: NOT AT ALL
3. TROUBLE FALLING OR STAYING ASLEEP: SEVERAL DAYS
6. FEELING BAD ABOUT YOURSELF - OR THAT YOU ARE A FAILURE OR HAVE LET YOURSELF OR YOUR FAMILY DOWN: NOT AT ALL
6. FEELING BAD ABOUT YOURSELF - OR THAT YOU ARE A FAILURE OR HAVE LET YOURSELF OR YOUR FAMILY DOWN: NOT AT ALL

## 2025-02-12 ENCOUNTER — OFFICE VISIT (OUTPATIENT)
Dept: INTERNAL MEDICINE CLINIC | Facility: CLINIC | Age: 55
End: 2025-02-12
Payer: COMMERCIAL

## 2025-02-12 VITALS
TEMPERATURE: 97.9 F | OXYGEN SATURATION: 97 % | WEIGHT: 199 LBS | BODY MASS INDEX: 35.26 KG/M2 | DIASTOLIC BLOOD PRESSURE: 79 MMHG | HEART RATE: 67 BPM | SYSTOLIC BLOOD PRESSURE: 130 MMHG | HEIGHT: 63 IN

## 2025-02-12 DIAGNOSIS — R73.03 PRE-DIABETES: ICD-10-CM

## 2025-02-12 DIAGNOSIS — R73.9 HYPERGLYCEMIA: ICD-10-CM

## 2025-02-12 DIAGNOSIS — M06.9 RHEUMATOID ARTHRITIS, INVOLVING UNSPECIFIED SITE, UNSPECIFIED WHETHER RHEUMATOID FACTOR PRESENT (HCC): ICD-10-CM

## 2025-02-12 DIAGNOSIS — F11.20 OPIOID DEPENDENCE WITH CURRENT USE (HCC): ICD-10-CM

## 2025-02-12 DIAGNOSIS — C77.3 SECONDARY AND UNSPECIFIED MALIGNANT NEOPLASM OF AXILLA AND UPPER LIMB LYMPH NODES (HCC): ICD-10-CM

## 2025-02-12 DIAGNOSIS — I10 ESSENTIAL HYPERTENSION: Primary | ICD-10-CM

## 2025-02-12 LAB
ANION GAP SERPL CALC-SCNC: 11 MMOL/L (ref 7–16)
BUN SERPL-MCNC: 15 MG/DL (ref 6–23)
CALCIUM SERPL-MCNC: 10.4 MG/DL (ref 8.8–10.2)
CHLORIDE SERPL-SCNC: 104 MMOL/L (ref 98–107)
CO2 SERPL-SCNC: 29 MMOL/L (ref 20–29)
CREAT SERPL-MCNC: 0.89 MG/DL (ref 0.6–1.1)
EST. AVERAGE GLUCOSE BLD GHB EST-MCNC: 138 MG/DL
GLUCOSE SERPL-MCNC: 128 MG/DL (ref 70–99)
HBA1C MFR BLD: 6.4 % (ref 0–5.6)
POTASSIUM SERPL-SCNC: 4.4 MMOL/L (ref 3.5–5.1)
SODIUM SERPL-SCNC: 144 MMOL/L (ref 136–145)

## 2025-02-12 PROCEDURE — 3075F SYST BP GE 130 - 139MM HG: CPT | Performed by: INTERNAL MEDICINE

## 2025-02-12 PROCEDURE — 3078F DIAST BP <80 MM HG: CPT | Performed by: INTERNAL MEDICINE

## 2025-02-12 PROCEDURE — 99214 OFFICE O/P EST MOD 30 MIN: CPT | Performed by: INTERNAL MEDICINE

## 2025-02-12 RX ORDER — LOSARTAN POTASSIUM AND HYDROCHLOROTHIAZIDE 12.5; 5 MG/1; MG/1
1 TABLET ORAL DAILY
Qty: 90 TABLET | Refills: 1 | Status: SHIPPED | OUTPATIENT
Start: 2025-02-12

## 2025-02-12 ASSESSMENT — ENCOUNTER SYMPTOMS
NAUSEA: 0
ABDOMINAL PAIN: 1
SHORTNESS OF BREATH: 0

## 2025-02-12 NOTE — PROGRESS NOTES
[facility]  Lake Wasserman M.D.  Internal Medicine  [address] [place] [address]  Office : [phone]  Fax : [phone]    Chief Complaint   Patient presents with    Hypertension       History of Present Illness:  Camille Mcintosh is a 54 y.o. female.  HPI    Hypertension  Patient is in for Hypertension which is stable.    Diet and Lifestyle: generally follows a low sodium diet, exercises sporadically  Home BP Monitoring: is not measured at home. Patient's recent blood pressures were previously   BP Readings from Last 3 Encounters:   02/12/25 130/79   02/05/25 130/85   11/26/24 (!) 151/87   .   taking medications as instructed, no medication side effects noted, no TIA's, no chest pain on exertion, no dyspnea on exertion, no swelling of ankles. Cardiac risk factors consist of obesity, hypertension.     Lab Results   Component Value Date/Time     10/31/2024 11:45 AM    K 3.8 10/31/2024 11:45 AM     10/31/2024 11:45 AM    CO2 29 10/31/2024 11:45 AM    BUN 18 10/31/2024 11:45 AM    GFRAA >60 08/31/2022 02:32 PM      Pre-Diabetes  Hemoglobin A1C   Date Value Ref Range Status   06/05/2024 6.3 (H) 0 - 5.6 % Final     Comment:     Reference Range  Normal       <5.7%  Prediabetes  5.7-6.4%  Diabetes     >6.4%         RA  Not taking MTX or Folate.  No longer on prednisone  Lab Results   Component Value Date    SEDRATE 2 10/25/2024     Lab Results   Component Value Date    CRP 6 05/08/2020     No components found for: \"RHEUMFACTOR\"    GERD  Controlled with intermittent use of pepcid    Depression with anxiety  Pristiq decreased and rheumatologist increased it back to 50 mg.  She liked cymbalta better but does not want to change now and declines referral to psychiatry.      Biliary Dyskinesia  Abdominal surgery post-poned due to recent diagnosis of breast cancer.  Still having intermittent pain  Lab Results   Component Value Date    ALT 37 10/31/2024

## 2025-02-17 ENCOUNTER — ANESTHESIA EVENT (OUTPATIENT)
Dept: ENDOSCOPY | Age: 55
End: 2025-02-17
Payer: COMMERCIAL

## 2025-02-18 ENCOUNTER — ANESTHESIA (OUTPATIENT)
Dept: ENDOSCOPY | Age: 55
End: 2025-02-18
Payer: COMMERCIAL

## 2025-02-18 ENCOUNTER — HOSPITAL ENCOUNTER (OUTPATIENT)
Age: 55
Setting detail: OUTPATIENT SURGERY
Discharge: HOME | End: 2025-02-18
Attending: INTERNAL MEDICINE | Admitting: INTERNAL MEDICINE
Payer: COMMERCIAL

## 2025-02-18 VITALS
HEIGHT: 63 IN | HEART RATE: 62 BPM | OXYGEN SATURATION: 98 % | TEMPERATURE: 97.9 F | RESPIRATION RATE: 13 BRPM | DIASTOLIC BLOOD PRESSURE: 70 MMHG | SYSTOLIC BLOOD PRESSURE: 123 MMHG | WEIGHT: 201.8 LBS | BODY MASS INDEX: 35.75 KG/M2

## 2025-02-18 PROCEDURE — 3700000000 HC ANESTHESIA ATTENDED CARE: Performed by: INTERNAL MEDICINE

## 2025-02-18 PROCEDURE — 2580000003 HC RX 258: Performed by: ANESTHESIOLOGY

## 2025-02-18 PROCEDURE — 2709999900 HC NON-CHARGEABLE SUPPLY: Performed by: INTERNAL MEDICINE

## 2025-02-18 PROCEDURE — C1769 GUIDE WIRE: HCPCS | Performed by: INTERNAL MEDICINE

## 2025-02-18 PROCEDURE — 7100000010 HC PHASE II RECOVERY - FIRST 15 MIN: Performed by: INTERNAL MEDICINE

## 2025-02-18 PROCEDURE — 7100000011 HC PHASE II RECOVERY - ADDTL 15 MIN: Performed by: INTERNAL MEDICINE

## 2025-02-18 PROCEDURE — 3609012400 HC EGD TRANSORAL BIOPSY SINGLE/MULTIPLE: Performed by: INTERNAL MEDICINE

## 2025-02-18 PROCEDURE — 3700000001 HC ADD 15 MINUTES (ANESTHESIA): Performed by: INTERNAL MEDICINE

## 2025-02-18 PROCEDURE — 88305 TISSUE EXAM BY PATHOLOGIST: CPT

## 2025-02-18 PROCEDURE — 6360000002 HC RX W HCPCS

## 2025-02-18 RX ORDER — LIDOCAINE HYDROCHLORIDE 20 MG/ML
INJECTION, SOLUTION EPIDURAL; INFILTRATION; INTRACAUDAL; PERINEURAL
Status: DISCONTINUED | OUTPATIENT
Start: 2025-02-18 | End: 2025-02-18 | Stop reason: SDUPTHER

## 2025-02-18 RX ORDER — SODIUM CHLORIDE 0.9 % (FLUSH) 0.9 %
5-40 SYRINGE (ML) INJECTION PRN
Status: DISCONTINUED | OUTPATIENT
Start: 2025-02-18 | End: 2025-02-18 | Stop reason: HOSPADM

## 2025-02-18 RX ORDER — SODIUM CHLORIDE 0.9 % (FLUSH) 0.9 %
5-40 SYRINGE (ML) INJECTION EVERY 12 HOURS SCHEDULED
Status: DISCONTINUED | OUTPATIENT
Start: 2025-02-18 | End: 2025-02-18 | Stop reason: HOSPADM

## 2025-02-18 RX ORDER — LIDOCAINE HYDROCHLORIDE 10 MG/ML
1 INJECTION, SOLUTION INFILTRATION; PERINEURAL
Status: DISCONTINUED | OUTPATIENT
Start: 2025-02-18 | End: 2025-02-18 | Stop reason: HOSPADM

## 2025-02-18 RX ORDER — SODIUM CHLORIDE 9 MG/ML
25 INJECTION, SOLUTION INTRAVENOUS PRN
Status: DISCONTINUED | OUTPATIENT
Start: 2025-02-18 | End: 2025-02-18 | Stop reason: HOSPADM

## 2025-02-18 RX ORDER — SODIUM CHLORIDE, SODIUM LACTATE, POTASSIUM CHLORIDE, CALCIUM CHLORIDE 600; 310; 30; 20 MG/100ML; MG/100ML; MG/100ML; MG/100ML
INJECTION, SOLUTION INTRAVENOUS CONTINUOUS
Status: DISCONTINUED | OUTPATIENT
Start: 2025-02-18 | End: 2025-02-18 | Stop reason: HOSPADM

## 2025-02-18 RX ORDER — ONDANSETRON 2 MG/ML
4 INJECTION INTRAMUSCULAR; INTRAVENOUS
Status: DISCONTINUED | OUTPATIENT
Start: 2025-02-18 | End: 2025-02-18 | Stop reason: HOSPADM

## 2025-02-18 RX ORDER — PROPOFOL 10 MG/ML
INJECTION, EMULSION INTRAVENOUS
Status: DISCONTINUED | OUTPATIENT
Start: 2025-02-18 | End: 2025-02-18 | Stop reason: SDUPTHER

## 2025-02-18 RX ORDER — IPRATROPIUM BROMIDE AND ALBUTEROL SULFATE 2.5; .5 MG/3ML; MG/3ML
1 SOLUTION RESPIRATORY (INHALATION)
Status: DISCONTINUED | OUTPATIENT
Start: 2025-02-18 | End: 2025-02-18 | Stop reason: HOSPADM

## 2025-02-18 RX ORDER — NALOXONE HYDROCHLORIDE 0.4 MG/ML
INJECTION, SOLUTION INTRAMUSCULAR; INTRAVENOUS; SUBCUTANEOUS PRN
Status: DISCONTINUED | OUTPATIENT
Start: 2025-02-18 | End: 2025-02-18 | Stop reason: HOSPADM

## 2025-02-18 RX ORDER — HALOPERIDOL 5 MG/ML
1 INJECTION INTRAMUSCULAR
Status: DISCONTINUED | OUTPATIENT
Start: 2025-02-18 | End: 2025-02-18 | Stop reason: HOSPADM

## 2025-02-18 RX ADMIN — LIDOCAINE HYDROCHLORIDE 100 MG: 20 INJECTION, SOLUTION EPIDURAL; INFILTRATION; INTRACAUDAL; PERINEURAL at 11:57

## 2025-02-18 RX ADMIN — SODIUM CHLORIDE, SODIUM LACTATE, POTASSIUM CHLORIDE, AND CALCIUM CHLORIDE: 600; 310; 30; 20 INJECTION, SOLUTION INTRAVENOUS at 11:54

## 2025-02-18 RX ADMIN — PHENYLEPHRINE HYDROCHLORIDE 100 MCG: 0.1 INJECTION, SOLUTION INTRAVENOUS at 12:08

## 2025-02-18 RX ADMIN — PHENYLEPHRINE HYDROCHLORIDE 100 MCG: 0.1 INJECTION, SOLUTION INTRAVENOUS at 12:03

## 2025-02-18 RX ADMIN — PROPOFOL 180 MCG/KG/MIN: 10 INJECTION, EMULSION INTRAVENOUS at 11:58

## 2025-02-18 RX ADMIN — PROPOFOL 70 MG: 10 INJECTION, EMULSION INTRAVENOUS at 11:57

## 2025-02-18 RX ADMIN — PHENYLEPHRINE HYDROCHLORIDE 100 MCG: 0.1 INJECTION, SOLUTION INTRAVENOUS at 12:20

## 2025-02-18 RX ADMIN — PHENYLEPHRINE HYDROCHLORIDE 100 MCG: 0.1 INJECTION, SOLUTION INTRAVENOUS at 12:10

## 2025-02-18 ASSESSMENT — PAIN - FUNCTIONAL ASSESSMENT: PAIN_FUNCTIONAL_ASSESSMENT: 0-10

## 2025-02-18 NOTE — ANESTHESIA POSTPROCEDURE EVALUATION
Department of Anesthesiology  Postprocedure Note    Patient: Camille Mcintosh  MRN: [id number]  YOB: 1970  Date of evaluation: 2/18/2025    Procedure Summary       Date: 02/18/25 Room / Location: [facility] ENDO 01 / [facility]OSCOPY    Anesthesia Start: 1154 Anesthesia Stop: 1227    Procedure: ESOPHAGOGASTRODUODENOSCOPY BIOPSY/dilation (Upper GI Region) Diagnosis:       Dysphagia, unspecified type      Nausea      Irritable bowel syndrome, unspecified type      Irritable bowel syndrome with constipation      Other constipation      (Dysphagia, unspecified type [R13.10])      (Nausea [R11.0])      (Irritable bowel syndrome, unspecified type [K58.9])      (Irritable bowel syndrome with constipation [K58.1])      (Other constipation [K59.09])    Surgeons: Barbara Cochran MD Responsible Provider: Alannah Rodriguez MD    Anesthesia Type: TIVA ASA Status: 2            Anesthesia Type: No value filed.    Serenity Phase I:      Serenity Phase II: Serenity Score: 8    Anesthesia Post Evaluation    Patient location during evaluation: PACU  Patient participation: complete - patient participated  Level of consciousness: awake and alert  Airway patency: patent  Nausea & Vomiting: no nausea  Cardiovascular status: hemodynamically stable  Respiratory status: acceptable  Hydration status: euvolemic  Pain management: adequate and satisfactory to patient        No notable events documented.

## 2025-02-18 NOTE — DISCHARGE INSTRUCTIONS
Gastrointestinal Esophagogastroduodenoscopy (EGD) - Upper Exam Discharge Instructions    1. Call Dr. Cochran  for any problems or questions.    2. Contact the doctor's office for follow up appointment as directed.    3. Medication may cause drowsiness for several hours, therefore:  Do not drive or operate machinery for remainder of the day.    No alcohol today.  Do not make any important or legal decisions for 24 hours.  Do not sign any legal documents for 24 hours.    5. Ordinarily, you may resume regular diet and activity after exam unless otherwise specified by your physician.    6. For mild soreness in your throat you may use Cepacol throat lozenges or warm salt-water gargles as needed.    Any additional instructions:  see attached report

## 2025-02-18 NOTE — H&P
GASTROENTEROLOGY H&P    Camillesylwia Mcintosh is 54 y.o. y/o female here for nausea and dysphagia.She endorses difficulty with liquids and solids for a few months. She is also endorsing nausea and occasional vomiting. She feels nauseous throughout the day. She has not noticed anything that helps with symptoms. She continues to have constipation, has bowel movements every 3 days. Taking Linzess 72 mcg daily. She endorses history of acid reflux but does not take anything for it. EGD in 2019, report reviewed overall normal exam.        Past Medical History:   Diagnosis Date    Agatston coronary artery calcium score less than 100 03/2018    CAC 0    Cancer (HCC)     Left breast cancer, radiation and surgery    Depression with anxiety     managed with medication    Essential hypertension     managed with medication    Family history of breast cancer     GERD (gastroesophageal reflux disease)     managed with meds    H/O right breast biopsy 6/2014, 7/2014    Low back pain 2015    Menopause     Obesity, Class I, BMI 30-34.9     PUD (peptic ulcer disease)     Pulmonary nodules 03/2018    3 mm right upper lobe nodule and 2 mm left upper lobe nodule. Per pt no growth.     Sacral pain      Past Surgical History:   Procedure Laterality Date    BREAST BIOPSY Left 01/14/2022    stereo bx     BREAST BIOPSY  7/2/2014    RIGHT BREAST NEEDLE LOCALIZED BIOPSY X2    performed by Kirk Ramos MD at [facility] MAIN OR    BREAST BIOPSY  06/2014    BREAST RECONSTRUCTION Bilateral 4/7/2022    BIALTERAL BREAST RECONSTRUCTION STAGE 1 WITH TISSUE EXPANDERS AND ADM performed by Gerry Mcdowell MD at [facility] OR    BREAST RECONSTRUCTION Bilateral 11/2022    BREAST RECONSTRUCTION Bilateral 07/18/2023    BILATERAL BREAST RECONSTRUCTION REVISION performed by Gerry Mcdowell MD at [facility] MAIN OR    BREAST SURGERY Bilateral 11/26/2024    BILATERAL BREAST RECONSTRUCTION  REVISION performed by Gerry Mcdowell MD at [facility] OR    BREAST SURGERY Bilateral 11/26/2024

## 2025-02-18 NOTE — ANESTHESIA PRE PROCEDURE
Department of Anesthesiology  Preprocedure Note       Name:  Camille Mcintosh   Age:  54 y.o.  :  1970                                          MRN:  [id number]         Date:  2025      Surgeon: Surgeon(s):  Barbara Cochran MD    Procedure: Procedure(s):  ESOPHAGOGASTRODUODENOSCOPY    Medications prior to admission:   Prior to Admission medications    Medication Sig Start Date End Date Taking? Authorizing Provider   losartan-hydroCHLOROthiazide (HYZAAR) 50-12.5 MG per tablet Take 1 tablet by mouth daily 25  Yes Lake Wasserman MD   zafirlukast (ACCOLATE) 20 MG tablet Take 1 tablet by mouth 2 times daily 24  Yes ProviderJoseph MD   linaclotide (LINZESS) 145 MCG capsule Take 1 capsule by mouth every morning (before breakfast) 25 Yes Barbara Cochran MD   pantoprazole (PROTONIX) 40 MG tablet Take 1 tablet by mouth every morning (before breakfast) 25  Yes Barbara Cochran MD   famotidine (PEPCID) 40 MG tablet Take 1 tablet by mouth as needed (GERD)  Patient taking differently: Take 1 tablet by mouth Daily 25  Yes Víctor Long DO   desvenlafaxine succinate (PRISTIQ) 50 MG TB24 extended release tablet Take 1 tablet by mouth daily 24  Yes Gege Mahan MD   gabapentin (NEURONTIN) 300 MG capsule Resume gabapentin 300 mg once daily at 6 PM  Patient taking differently: nightly as needed. Resume gabapentin 300 mg once daily at 6 PM 11/7/24 10/23/25 Yes Gege Mahan MD   vitamin D (ERGOCALCIFEROL) 1.25 MG (46290 UT) CAPS capsule Vit d 50,000 units changed to twice weekly for the next month then once weekly thereafter. 24  Yes Gege Mahan MD   tamoxifen (NOLVADEX) 10 MG tablet Take 1 tablet by mouth daily OK to start with 1/2 tablet daily until tolerance established  Patient taking differently: Take 1 tablet by mouth at bedtime OK to start with 1/2 tablet daily until tolerance established 24  Yes Rex Hanson MD

## 2025-02-26 ENCOUNTER — PATIENT MESSAGE (OUTPATIENT)
Age: 55
End: 2025-02-26

## 2025-02-26 NOTE — TELEPHONE ENCOUNTER
Called pt in response to [employer] message. Reviewed results of EGD/biopsies from 2/18/25 per Dr. Cochran. Pt verbalized understanding, agreeable to continue taking PPI as advised by Dr. Cochran.     Pt requested to schedule follow up visit in about 3 months to discuss any potential ongoing symptoms and/or plan for if she needs to take PPI indefinitely, etc. Scheduled pt for VV f/u with Dr. Cochran 5/16/25 at 1000 as requested by pt.       ---------------------------------------------------    Per Dr. Cochran:  \"- Follow up pathology results\"    [EGD 2/18/25:]  \"Impression:         -  Normal esophagus.  Dilated.         -  Biopsies were taken with a cold forceps for evaluation of            eosinophilic esophagitis.         -  Normal stomach.  Biopsied.         -  Normal examined duodenum.  Recommendation:         -  Await pathology results.         -  Return to GI clinic at appointment to be scheduled.\"    [Pathology 2/18/25:]  \"A:  Stomach, biopsy: Mild chronic gastritis, without activity.  Negative for Helicobacter pylori.     B:  Distal esophagus, biopsy: Hyperplastic squamous esophageal mucosa with rare intraepithelial eosinophils, consistent with gastroesophageal reflux disease.     C: Mid esophagus, biopsy: Histologically unremarkable squamous esophageal mucosa.  Negative for intraepithelial eosinophilia.\"      Message per Dr. Cochran 2/25/25 after review of path results:  \"eosinophils in distal esophagus not proximal so more in line with reflux esophagitis. She should be taking PPI.\"

## 2025-03-01 RX ORDER — DESVENLAFAXINE 50 MG/1
50 TABLET, FILM COATED, EXTENDED RELEASE ORAL DAILY
Qty: 90 TABLET | Refills: 0 | OUTPATIENT
Start: 2025-03-01

## 2025-04-11 ENCOUNTER — TELEPHONE (OUTPATIENT)
Dept: ONCOLOGY | Age: 55
End: 2025-04-11

## 2025-04-21 NOTE — PROGRESS NOTES
MUSCULOSKELETAL:   -Hands: Mild tenderness on the PIPs and CMC bilaterally  -Wrists: See above  -Elbows: normal   -Shoulders: normal   -Neck: Slow but full range of motion due to pain  -Back: Point tenderness of the low back  -Hips: Normal  -Knees: normal   -Ankles: normal   -Feet: normal    Swollen Joint Count: 0  Tender Joint Count: 3         Assessment:       Mrs. Mcintosh is a 54-old  lady with past medical history significant for hypertension, GERD, obesity, depression, sacral pain and chronic myalgias and arthralgias who presents for fu.    Work up previously normal CBC, CMP, TSH, CCP, SSA SSB, AMPARO, normal RF, CCP, CK, hep panel, Xrays show DJD changes. Very low vit d -on replacement .     Clinical picture consistent with both inflammatory and noninflammatory process, likely seronegative given autoimmune serologies normal.  Is tolerating Pristiq with as needed tramadol/Tylenol.  Has had dramatic response to prednisone in the past, however hesitant to start DMARDs and so far stable disease activity as are labs.    breast cancer diagnosis April 2022 -s/p extensive treatments including bilateral mastectomy, radiation tried on 2 different aromatase inhibitors, unable to tolerate side effects.    Fatigue -multifactorial, is mostly stable but given worsening vitamin D levels again recommend compliance with supplementation as below.        Total visit time 41 minutes, greater than half of the time was spent on counseling.          Plan:       1.  Vit d 50,000 units   once weekly for now - needs refills    2.  Tramadol 50 mg up to every 8 hours as needed number of pills 90  3.  pristiq 50mg daily   4.   gabapentin 300 mg once daily at 6 PM as needed for flares  5.  tylenol 1000mg every am and pm - scheduled  6.      RTC in 6 months with labs vitamin D, lupus panel, ESR rheumatoid factor and CCP call if any issues

## 2025-04-22 ENCOUNTER — OFFICE VISIT (OUTPATIENT)
Dept: RHEUMATOLOGY | Age: 55
End: 2025-04-22
Payer: COMMERCIAL

## 2025-04-22 VITALS
DIASTOLIC BLOOD PRESSURE: 84 MMHG | BODY MASS INDEX: 35.08 KG/M2 | HEART RATE: 65 BPM | HEIGHT: 63 IN | SYSTOLIC BLOOD PRESSURE: 145 MMHG | WEIGHT: 198 LBS

## 2025-04-22 DIAGNOSIS — Z79.899 HIGH RISK MEDICATION USE: ICD-10-CM

## 2025-04-22 DIAGNOSIS — M06.4 INFLAMMATORY POLYARTHRITIS (HCC): Primary | ICD-10-CM

## 2025-04-22 DIAGNOSIS — E55.9 VITAMIN D DEFICIENCY, UNSPECIFIED: ICD-10-CM

## 2025-04-22 DIAGNOSIS — M54.16 RADICULOPATHY, LUMBAR REGION: ICD-10-CM

## 2025-04-22 DIAGNOSIS — Z79.60 LONG TERM CURRENT USE OF IMMUNOSUPPRESSIVE DRUG: ICD-10-CM

## 2025-04-22 DIAGNOSIS — F32.0 MAJOR DEPRESSIVE DISORDER, SINGLE EPISODE, MILD: ICD-10-CM

## 2025-04-22 DIAGNOSIS — G62.9 NEUROPATHY: ICD-10-CM

## 2025-04-22 DIAGNOSIS — M79.7 FIBROMYALGIA: ICD-10-CM

## 2025-04-22 DIAGNOSIS — M79.10 MYALGIA, UNSPECIFIED SITE: ICD-10-CM

## 2025-04-22 DIAGNOSIS — M15.9 GENERALIZED OSTEOARTHRITIS: ICD-10-CM

## 2025-04-22 DIAGNOSIS — Z17.0 MALIGNANT NEOPLASM OF BREAST IN FEMALE, ESTROGEN RECEPTOR POSITIVE, UNSPECIFIED LATERALITY, UNSPECIFIED SITE OF BREAST (HCC): ICD-10-CM

## 2025-04-22 DIAGNOSIS — C50.919 MALIGNANT NEOPLASM OF BREAST IN FEMALE, ESTROGEN RECEPTOR POSITIVE, UNSPECIFIED LATERALITY, UNSPECIFIED SITE OF BREAST (HCC): ICD-10-CM

## 2025-04-22 DIAGNOSIS — R53.83 OTHER FATIGUE: ICD-10-CM

## 2025-04-22 DIAGNOSIS — Z79.52 LONG TERM (CURRENT) USE OF SYSTEMIC STEROIDS: ICD-10-CM

## 2025-04-22 LAB
25(OH)D3+25(OH)D2 SERPL-MCNC: 27.9 NG/ML (ref 30–100)
CENTROMERE B AB SER-ACNC: <0.2 AI (ref 0–0.9)
CHROMATIN AB SERPL-ACNC: <0.2 AI (ref 0–0.9)
DSDNA AB SER-ACNC: <1 IU/ML (ref 0–9)
ENA JO1 AB SER-ACNC: <0.2 AI (ref 0–0.9)
ENA RNP AB SER-ACNC: <0.2 AI (ref 0–0.9)
ENA SCL70 AB SER-ACNC: <0.2 AI (ref 0–0.9)
ENA SM AB SER-ACNC: <0.2 AI (ref 0–0.9)
ENA SS-A AB SER-ACNC: <0.2 AI (ref 0–0.9)
ENA SS-B AB SER-ACNC: <0.2 AI (ref 0–0.9)
ERYTHROCYTE [SEDIMENTATION RATE] IN BLOOD BY WESTERGREN METHOD: 33 MM/HR (ref 0–40)
Lab: NORMAL
RHEUMATOID FACT SERPL-ACNC: 11.4 IU/ML

## 2025-04-22 PROCEDURE — 3077F SYST BP >= 140 MM HG: CPT | Performed by: INTERNAL MEDICINE

## 2025-04-22 PROCEDURE — 3079F DIAST BP 80-89 MM HG: CPT | Performed by: INTERNAL MEDICINE

## 2025-04-22 PROCEDURE — 99215 OFFICE O/P EST HI 40 MIN: CPT | Performed by: INTERNAL MEDICINE

## 2025-04-22 RX ORDER — TRAMADOL HYDROCHLORIDE 50 MG/1
50 TABLET ORAL EVERY 8 HOURS PRN
Qty: 90 TABLET | Refills: 2 | Status: SHIPPED | OUTPATIENT
Start: 2025-04-22 | End: 2025-07-21

## 2025-04-22 RX ORDER — GABAPENTIN 300 MG/1
CAPSULE ORAL
Qty: 90 CAPSULE | Refills: 1 | Status: SHIPPED | OUTPATIENT
Start: 2025-04-22 | End: 2026-04-07

## 2025-04-22 RX ORDER — DESVENLAFAXINE 50 MG/1
50 TABLET, FILM COATED, EXTENDED RELEASE ORAL DAILY
Qty: 90 TABLET | Refills: 1 | Status: SHIPPED | OUTPATIENT
Start: 2025-04-22

## 2025-04-22 RX ORDER — ERGOCALCIFEROL 1.25 MG/1
CAPSULE, LIQUID FILLED ORAL
Qty: 12 CAPSULE | Refills: 1 | Status: SHIPPED | OUTPATIENT
Start: 2025-04-22

## 2025-04-22 NOTE — PATIENT INSTRUCTIONS
1.  Vit d 50,000 units   once weekly for now - needs refills    2.  Tramadol 50 mg up to every 8 hours as needed number of pills 90  3.  pristiq 50mg daily   4.   gabapentin 300 mg once daily at 6 PM as needed for flares  5.  tylenol 1000mg every am and pm - scheduled  6.      RTC in 6 months with labs vitamin D, lupus panel, ESR rheumatoid factor and CCP call if any issues    6MO,[facility] prior w pt

## 2025-04-23 LAB — CCP IGA+IGG SERPL IA-ACNC: 9 UNITS (ref 0–19)

## 2025-05-16 ENCOUNTER — HOSPITAL ENCOUNTER (OUTPATIENT)
Dept: LAB | Age: 55
Discharge: HOME | End: 2025-05-16
Payer: COMMERCIAL

## 2025-05-16 ENCOUNTER — TELEMEDICINE (OUTPATIENT)
Age: 55
End: 2025-05-16
Payer: COMMERCIAL

## 2025-05-16 ENCOUNTER — OFFICE VISIT (OUTPATIENT)
Dept: ONCOLOGY | Age: 55
End: 2025-05-16
Payer: COMMERCIAL

## 2025-05-16 VITALS
HEIGHT: 63 IN | HEART RATE: 68 BPM | WEIGHT: 197 LBS | RESPIRATION RATE: 20 BRPM | OXYGEN SATURATION: 99 % | DIASTOLIC BLOOD PRESSURE: 66 MMHG | TEMPERATURE: 97.9 F | SYSTOLIC BLOOD PRESSURE: 121 MMHG | BODY MASS INDEX: 34.91 KG/M2

## 2025-05-16 DIAGNOSIS — Z17.0 MALIGNANT NEOPLASM OF LEFT BREAST IN FEMALE, ESTROGEN RECEPTOR POSITIVE, UNSPECIFIED SITE OF BREAST (HCC): ICD-10-CM

## 2025-05-16 DIAGNOSIS — R13.10 DYSPHAGIA, UNSPECIFIED TYPE: ICD-10-CM

## 2025-05-16 DIAGNOSIS — C50.912 MALIGNANT NEOPLASM OF LEFT BREAST IN FEMALE, ESTROGEN RECEPTOR POSITIVE, UNSPECIFIED SITE OF BREAST (HCC): ICD-10-CM

## 2025-05-16 DIAGNOSIS — C77.3 SECONDARY AND UNSPECIFIED MALIGNANT NEOPLASM OF AXILLA AND UPPER LIMB LYMPH NODES (HCC): ICD-10-CM

## 2025-05-16 DIAGNOSIS — R11.0 NAUSEA: ICD-10-CM

## 2025-05-16 DIAGNOSIS — C50.912 MALIGNANT NEOPLASM OF LEFT BREAST IN FEMALE, ESTROGEN RECEPTOR POSITIVE, UNSPECIFIED SITE OF BREAST (HCC): Primary | ICD-10-CM

## 2025-05-16 DIAGNOSIS — Z17.0 MALIGNANT NEOPLASM OF LEFT BREAST IN FEMALE, ESTROGEN RECEPTOR POSITIVE, UNSPECIFIED SITE OF BREAST (HCC): Primary | ICD-10-CM

## 2025-05-16 LAB
ALBUMIN SERPL-MCNC: 4 G/DL (ref 3.5–5)
ALBUMIN/GLOB SERPL: 1.1 (ref 1–1.9)
ALP SERPL-CCNC: 124 U/L (ref 35–104)
ALT SERPL-CCNC: 56 U/L (ref 8–45)
ANION GAP SERPL CALC-SCNC: 12 MMOL/L (ref 7–16)
AST SERPL-CCNC: 46 U/L (ref 15–37)
BASOPHILS # BLD: 0.05 K/UL (ref 0–0.2)
BASOPHILS NFR BLD: 0.7 % (ref 0–2)
BILIRUB SERPL-MCNC: 0.4 MG/DL (ref 0–1.2)
BUN SERPL-MCNC: 15 MG/DL (ref 6–23)
CALCIUM SERPL-MCNC: 10 MG/DL (ref 8.8–10.2)
CHLORIDE SERPL-SCNC: 102 MMOL/L (ref 98–107)
CO2 SERPL-SCNC: 27 MMOL/L (ref 20–29)
CREAT SERPL-MCNC: 0.76 MG/DL (ref 0.6–1.1)
DIFFERENTIAL METHOD BLD: ABNORMAL
EOSINOPHIL # BLD: 0.29 K/UL (ref 0–0.8)
EOSINOPHIL NFR BLD: 3.9 % (ref 0.5–7.8)
ERYTHROCYTE [DISTWIDTH] IN BLOOD BY AUTOMATED COUNT: 15.5 % (ref 11.9–14.6)
GLOBULIN SER CALC-MCNC: 3.8 G/DL (ref 2.3–3.5)
GLUCOSE SERPL-MCNC: 106 MG/DL (ref 70–99)
HCT VFR BLD AUTO: 43.4 % (ref 35.8–46.3)
HGB BLD-MCNC: 14.2 G/DL (ref 11.7–15.4)
IMM GRANULOCYTES # BLD AUTO: 0.03 K/UL (ref 0–0.5)
IMM GRANULOCYTES NFR BLD AUTO: 0.4 % (ref 0–5)
LYMPHOCYTES # BLD: 2.91 K/UL (ref 0.5–4.6)
LYMPHOCYTES NFR BLD: 39.2 % (ref 13–44)
MCH RBC QN AUTO: 28.8 PG (ref 26.1–32.9)
MCHC RBC AUTO-ENTMCNC: 32.7 G/DL (ref 31.4–35)
MCV RBC AUTO: 88 FL (ref 82–102)
MONOCYTES # BLD: 0.43 K/UL (ref 0.1–1.3)
MONOCYTES NFR BLD: 5.8 % (ref 4–12)
NEUTS SEG # BLD: 3.71 K/UL (ref 1.7–8.2)
NEUTS SEG NFR BLD: 50 % (ref 43–78)
NRBC # BLD: 0 K/UL (ref 0–0.2)
PLATELET # BLD AUTO: 361 K/UL (ref 150–450)
PMV BLD AUTO: 10.1 FL (ref 9.4–12.3)
POTASSIUM SERPL-SCNC: 3.8 MMOL/L (ref 3.5–5.1)
PROT SERPL-MCNC: 7.8 G/DL (ref 6.3–8.2)
RBC # BLD AUTO: 4.93 M/UL (ref 4.05–5.2)
SODIUM SERPL-SCNC: 141 MMOL/L (ref 136–145)
WBC # BLD AUTO: 7.4 K/UL (ref 4.3–11.1)

## 2025-05-16 PROCEDURE — 80053 COMPREHEN METABOLIC PANEL: CPT

## 2025-05-16 PROCEDURE — 36415 COLL VENOUS BLD VENIPUNCTURE: CPT

## 2025-05-16 PROCEDURE — 99214 OFFICE O/P EST MOD 30 MIN: CPT | Performed by: INTERNAL MEDICINE

## 2025-05-16 PROCEDURE — 3074F SYST BP LT 130 MM HG: CPT | Performed by: INTERNAL MEDICINE

## 2025-05-16 PROCEDURE — 3078F DIAST BP <80 MM HG: CPT | Performed by: INTERNAL MEDICINE

## 2025-05-16 PROCEDURE — 85025 COMPLETE CBC W/AUTO DIFF WBC: CPT

## 2025-05-16 RX ORDER — TAMOXIFEN CITRATE 10 MG/1
10 TABLET ORAL DAILY
Qty: 30 TABLET | Refills: 11 | Status: SHIPPED | OUTPATIENT
Start: 2025-05-16

## 2025-05-16 RX ORDER — PANTOPRAZOLE SODIUM 40 MG/1
40 TABLET, DELAYED RELEASE ORAL
Qty: 90 TABLET | Refills: 1 | Status: SHIPPED | OUTPATIENT
Start: 2025-05-16

## 2025-05-16 ASSESSMENT — PATIENT HEALTH QUESTIONNAIRE - PHQ9
SUM OF ALL RESPONSES TO PHQ QUESTIONS 1-9: 0
2. FEELING DOWN, DEPRESSED OR HOPELESS: NOT AT ALL
SUM OF ALL RESPONSES TO PHQ QUESTIONS 1-9: 0
SUM OF ALL RESPONSES TO PHQ QUESTIONS 1-9: 0
1. LITTLE INTEREST OR PLEASURE IN DOING THINGS: NOT AT ALL
SUM OF ALL RESPONSES TO PHQ QUESTIONS 1-9: 0

## 2025-05-16 NOTE — PATIENT INSTRUCTIONS
Patient Information from Today's Visit    The members of your [facility] are listed below:    Physician Provider: Dr. Rex Hanson  Advanced Practice Clinician: Yuli Rivera  Registered Nurse: Rogelio PARIKH   Nurse Navigator: Tameka CARRION RN and Lynette JENKINS RN  Medical Assistant: Tameka \"Leann\" JESICA   : Ly \"Caitie\" C.   Supportive Care Services: GUSTAVO Sloan    Diagnosis (Information Sheet Provided on Day of Diagnosis): Breast Cancer    Follow Up Instructions:   6 months    Has Treatment Plan Been Finalized? N/A     Current Labs:   Hospital Outpatient Visit on 05/16/2025   Component Date Value Ref Range Status    WBC 05/16/2025 7.4  4.3 - 11.1 K/uL Final    RBC 05/16/2025 4.93  4.05 - 5.2 M/uL Final    Hemoglobin 05/16/2025 14.2  11.7 - 15.4 g/dL Final    Hematocrit 05/16/2025 43.4  35.8 - 46.3 % Final    MCV 05/16/2025 88.0  82.0 - 102.0 FL Final    MCH 05/16/2025 28.8  26.1 - 32.9 PG Final    MCHC 05/16/2025 32.7  31.4 - 35.0 g/dL Final    RDW 05/16/2025 15.5 (H)  11.9 - 14.6 % Final    Platelets 05/16/2025 361  150 - 450 K/uL Final    MPV 05/16/2025 10.1  9.4 - 12.3 FL Final    nRBC 05/16/2025 0.00  0.0 - 0.2 K/uL Final    **Note: Absolute NRBC parameter is now reported with Hemogram**    Neutrophils % 05/16/2025 50.0  43.0 - 78.0 % Final    Lymphocytes % 05/16/2025 39.2  13.0 - 44.0 % Final    Monocytes % 05/16/2025 5.8  4.0 - 12.0 % Final    Eosinophils % 05/16/2025 3.9  0.5 - 7.8 % Final    Basophils % 05/16/2025 0.7  0.0 - 2.0 % Final    Immature Granulocytes % 05/16/2025 0.4  0.0 - 5.0 % Final    Neutrophils Absolute 05/16/2025 3.71  1.70 - 8.20 K/UL Final    Lymphocytes Absolute 05/16/2025 2.91  0.50 - 4.60 K/UL Final    Monocytes Absolute 05/16/2025 0.43  0.10 - 1.30 K/UL Final    Eosinophils Absolute 05/16/2025 0.29  0.00 - 0.80 K/UL Final    Basophils Absolute 05/16/2025 0.05  0.00 - 0.20 K/UL Final    Immature Granulocytes Absolute 05/16/2025 0.03  0.00 - 0.50 K/UL Final

## 2025-05-16 NOTE — PROGRESS NOTES
Camille Mcintosh, was evaluated through a synchronous (real-time) audio-video encounter. The patient (or guardian if applicable) is aware that this is a billable service, which includes applicable co-pays. This Virtual Visit was conducted with patient's (and/or legal guardian's) consent. Patient identification was verified, and a caregiver was present when appropriate.   The patient was located at Home: [address] [place] [address] [place] [address]  Provider was located at Other: [place]  Confirm you are appropriately licensed, registered, or certified to deliver care in the state where the patient is located as indicated above. If you are not or unsure, please re-schedule the visit: Yes, I confirm.     Camille Mcintosh (:  1970) is a Established patient, presenting virtually for evaluation of the following:    Dysphagia.     Since our last visit dysphagia has improved overall. We reviewed recent EGD with biopsies. EoE biopsies negative.  She continues to have globus sensation that is worse with certain food triggers such as spicy foods, onions, corn bread. Currently taking Protonix as needed for acid reflux symptoms. She has no other complaints today.     Below is the assessment and plan developed based on review of pertinent history, physical exam, labs, studies, and medications.     Assessment and Plan:   Dysphagia   Globus sensation   Acid reflux symptoms     - Recommend Protonix daily to see if this helps with globus sensation  - Avoid food triggers as able   - RTC PRN       On this date 2025 I have spent 30 minutes reviewing previous notes, test results, and other pertinent medical information with the patient, discussing the diagnosis and importance of compliance with the treatment plan as well as documenting on the day of the visit.    --Barbara Cochran MD

## 2025-05-16 NOTE — PROGRESS NOTES
[facility]: Office Visit Established Patient    Chief Complaint:    Chief Complaint   Patient presents with    Follow-up     History of Present Illness:  Ms. Mcintosh is a 54 y.o. female who returns today for management of breast cancer.  She initially presented for a routine bilateral screening mammogram on 1/8/22 which identified left breast calcifications. Further evaluation with left breast diagnostic mammogram on 1/13/22 confirmed the  presence of clustered microcalcifications at the 3 o'clock position of the left breast, biopsy was recommended.  This was completed on 1/14/22 with pathology revealing invasive ductal carcinoma, low grade, ER 98%/WI 80%, HER2 negative.  MRI of the bilateral  breasts was completed on 1/18/22 demonstrating the left 3:00 breast cancer measuring up to 2.1 cm with no additional suspicious findings.  She was referred to Dr. Keita on 1/27/22, she was referred to genetics but ultimately opted to forgo testing and decided on bilateral mastectomy.  Path reviewed, this showed 18 mm of tumor in the left breast with 1 of 3 sentinel nodes positive for carcinoma.  Right breast was benign.  Despite the unexpected positive sentinel node, she still has [id number] disease  with only 1 node involved, so she would be appropriate for Oncotype Dx analysis for risk stratification.  This has returned in the low risk range at 10, because she is over 50 she is squarely within a category of patients who did not benefit from adjuvant chemotherapy.  Therefore, we will recommend antiestrogen therapy alone.  She is surgically post-menopausal, so a prescription was given for anastrozole.  Despite the bilateral mastectomy, I would like her to meet with radiation oncology to discuss adjuvant XRT given the positive node.     History of Present Illness  The patient, a 54-year-old female, presents for follow-up regarding her breast cancer management, currently undergoing treatment with

## 2025-06-05 ASSESSMENT — PATIENT HEALTH QUESTIONNAIRE - PHQ9
SUM OF ALL RESPONSES TO PHQ QUESTIONS 1-9: 4
5. POOR APPETITE OR OVEREATING: NOT AT ALL
SUM OF ALL RESPONSES TO PHQ QUESTIONS 1-9: 4
10. IF YOU CHECKED OFF ANY PROBLEMS, HOW DIFFICULT HAVE THESE PROBLEMS MADE IT FOR YOU TO DO YOUR WORK, TAKE CARE OF THINGS AT HOME, OR GET ALONG WITH OTHER PEOPLE: NOT DIFFICULT AT ALL
4. FEELING TIRED OR HAVING LITTLE ENERGY: SEVERAL DAYS
SUM OF ALL RESPONSES TO PHQ QUESTIONS 1-9: 4
5. POOR APPETITE OR OVEREATING: NOT AT ALL
SUM OF ALL RESPONSES TO PHQ QUESTIONS 1-9: 4
8. MOVING OR SPEAKING SO SLOWLY THAT OTHER PEOPLE COULD HAVE NOTICED. OR THE OPPOSITE - BEING SO FIDGETY OR RESTLESS THAT YOU HAVE BEEN MOVING AROUND A LOT MORE THAN USUAL: NOT AT ALL
7. TROUBLE CONCENTRATING ON THINGS, SUCH AS READING THE NEWSPAPER OR WATCHING TELEVISION: NOT AT ALL
9. THOUGHTS THAT YOU WOULD BE BETTER OFF DEAD, OR OF HURTING YOURSELF: NOT AT ALL
6. FEELING BAD ABOUT YOURSELF - OR THAT YOU ARE A FAILURE OR HAVE LET YOURSELF OR YOUR FAMILY DOWN: NOT AT ALL
1. LITTLE INTEREST OR PLEASURE IN DOING THINGS: SEVERAL DAYS
SUM OF ALL RESPONSES TO PHQ QUESTIONS 1-9: 4
3. TROUBLE FALLING OR STAYING ASLEEP: SEVERAL DAYS
7. TROUBLE CONCENTRATING ON THINGS, SUCH AS READING THE NEWSPAPER OR WATCHING TELEVISION: NOT AT ALL
8. MOVING OR SPEAKING SO SLOWLY THAT OTHER PEOPLE COULD HAVE NOTICED. OR THE OPPOSITE, BEING SO FIGETY OR RESTLESS THAT YOU HAVE BEEN MOVING AROUND A LOT MORE THAN USUAL: NOT AT ALL
2. FEELING DOWN, DEPRESSED OR HOPELESS: SEVERAL DAYS
4. FEELING TIRED OR HAVING LITTLE ENERGY: SEVERAL DAYS
2. FEELING DOWN, DEPRESSED OR HOPELESS: SEVERAL DAYS
3. TROUBLE FALLING OR STAYING ASLEEP: SEVERAL DAYS
9. THOUGHTS THAT YOU WOULD BE BETTER OFF DEAD, OR OF HURTING YOURSELF: NOT AT ALL
1. LITTLE INTEREST OR PLEASURE IN DOING THINGS: SEVERAL DAYS
10. IF YOU CHECKED OFF ANY PROBLEMS, HOW DIFFICULT HAVE THESE PROBLEMS MADE IT FOR YOU TO DO YOUR WORK, TAKE CARE OF THINGS AT HOME, OR GET ALONG WITH OTHER PEOPLE: NOT DIFFICULT AT ALL
6. FEELING BAD ABOUT YOURSELF - OR THAT YOU ARE A FAILURE OR HAVE LET YOURSELF OR YOUR FAMILY DOWN: NOT AT ALL

## 2025-06-12 ENCOUNTER — OFFICE VISIT (OUTPATIENT)
Dept: INTERNAL MEDICINE CLINIC | Facility: CLINIC | Age: 55
End: 2025-06-12
Payer: COMMERCIAL

## 2025-06-12 VITALS
HEIGHT: 63 IN | TEMPERATURE: 97.4 F | SYSTOLIC BLOOD PRESSURE: 129 MMHG | BODY MASS INDEX: 34.91 KG/M2 | DIASTOLIC BLOOD PRESSURE: 60 MMHG | HEART RATE: 69 BPM | WEIGHT: 197 LBS | OXYGEN SATURATION: 94 %

## 2025-06-12 DIAGNOSIS — I10 ESSENTIAL HYPERTENSION: ICD-10-CM

## 2025-06-12 DIAGNOSIS — R73.03 PRE-DIABETES: ICD-10-CM

## 2025-06-12 DIAGNOSIS — R73.03 PRE-DIABETES: Primary | ICD-10-CM

## 2025-06-12 LAB
EST. AVERAGE GLUCOSE BLD GHB EST-MCNC: 136 MG/DL
HBA1C MFR BLD: 6.4 % (ref 0–5.6)

## 2025-06-12 PROCEDURE — 99214 OFFICE O/P EST MOD 30 MIN: CPT | Performed by: INTERNAL MEDICINE

## 2025-06-12 PROCEDURE — 3078F DIAST BP <80 MM HG: CPT | Performed by: INTERNAL MEDICINE

## 2025-06-12 PROCEDURE — 3074F SYST BP LT 130 MM HG: CPT | Performed by: INTERNAL MEDICINE

## 2025-06-12 RX ORDER — LOSARTAN POTASSIUM AND HYDROCHLOROTHIAZIDE 12.5; 5 MG/1; MG/1
1 TABLET ORAL DAILY
Qty: 90 TABLET | Refills: 1 | Status: SHIPPED | OUTPATIENT
Start: 2025-06-12

## 2025-06-12 SDOH — ECONOMIC STABILITY: FOOD INSECURITY: WITHIN THE PAST 12 MONTHS, YOU WORRIED THAT YOUR FOOD WOULD RUN OUT BEFORE YOU GOT MONEY TO BUY MORE.: NEVER TRUE

## 2025-06-12 SDOH — ECONOMIC STABILITY: FOOD INSECURITY: WITHIN THE PAST 12 MONTHS, THE FOOD YOU BOUGHT JUST DIDN'T LAST AND YOU DIDN'T HAVE MONEY TO GET MORE.: NEVER TRUE

## 2025-06-12 ASSESSMENT — ENCOUNTER SYMPTOMS: SHORTNESS OF BREATH: 0

## 2025-06-12 NOTE — PROGRESS NOTES
[facility]  Lake Wasserman M.D.  Internal Medicine  [address] [place] [address]  Office : [phone]  Fax : [phone]    Chief Complaint   Patient presents with    Hypertension     4 mo follow up       History of Present Illness:  Camille Mcintosh is a 54 y.o. female.  HPI    Hypertension  Patient is in for Hypertension which is stable.    Diet and Lifestyle: generally follows a low sodium diet, exercises sporadically  Home BP Monitoring: is not measured at home. Patient's recent blood pressures were previously   BP Readings from Last 3 Encounters:   06/12/25 129/60   05/16/25 121/66   04/22/25 (!) 145/84   .   taking medications as instructed, no medication side effects noted, no TIA's, no chest pain on exertion, no dyspnea on exertion, no swelling of ankles. Cardiac risk factors consist of hypertension.     Lab Results   Component Value Date/Time     05/16/2025 03:01 PM    K 3.8 05/16/2025 03:01 PM     05/16/2025 03:01 PM    CO2 27 05/16/2025 03:01 PM    BUN 15 05/16/2025 03:01 PM    GFRAA >60 08/31/2022 02:32 PM         Pre-Diabetes  Hemoglobin A1C   Date Value Ref Range Status   02/12/2025 6.4 (H) 0 - 5.6 % Final     Comment:     Reference Range  Normal       <5.7%  Prediabetes  5.7-6.4%  Diabetes     >6.4%         RA  Rheumatology managing  Lab Results   Component Value Date    SEDRATE 33 04/21/2025     Lab Results   Component Value Date    CRP 6 05/08/2020     No components found for: \"RHEUMFACTOR\"      GERD  Controlled with intermittent use of pepcid    Depression with anxiety  Pristiq decreased and rheumatologist increased it back to 50 mg.  She liked cymbalta better but does not want to change now and declines referral to psychiatry.      Biliary Dyskinesia  Abdominal surgery post-poned due to recent diagnosis of breast cancer.  Still having intermittent pain    Breast Cancer  Had bilateral mastectomies with lymph node dissection and

## 2025-06-13 ENCOUNTER — RESULTS FOLLOW-UP (OUTPATIENT)
Dept: INTERNAL MEDICINE CLINIC | Facility: CLINIC | Age: 55
End: 2025-06-13

## 2025-07-28 DIAGNOSIS — Z17.0 MALIGNANT NEOPLASM OF BREAST IN FEMALE, ESTROGEN RECEPTOR POSITIVE, UNSPECIFIED LATERALITY, UNSPECIFIED SITE OF BREAST (HCC): ICD-10-CM

## 2025-07-28 DIAGNOSIS — M06.4 INFLAMMATORY POLYARTHRITIS (HCC): ICD-10-CM

## 2025-07-28 DIAGNOSIS — R53.83 OTHER FATIGUE: ICD-10-CM

## 2025-07-28 DIAGNOSIS — C50.919 MALIGNANT NEOPLASM OF BREAST IN FEMALE, ESTROGEN RECEPTOR POSITIVE, UNSPECIFIED LATERALITY, UNSPECIFIED SITE OF BREAST (HCC): ICD-10-CM

## 2025-07-28 DIAGNOSIS — Z79.60 LONG TERM CURRENT USE OF IMMUNOSUPPRESSIVE DRUG: ICD-10-CM

## 2025-07-28 DIAGNOSIS — F32.0 MAJOR DEPRESSIVE DISORDER, SINGLE EPISODE, MILD: ICD-10-CM

## 2025-07-28 DIAGNOSIS — E55.9 HYPOVITAMINOSIS D: ICD-10-CM

## 2025-07-28 DIAGNOSIS — M15.9 GENERALIZED OSTEOARTHRITIS: ICD-10-CM

## 2025-07-28 DIAGNOSIS — Z79.52 LONG TERM (CURRENT) USE OF SYSTEMIC STEROIDS: ICD-10-CM

## 2025-07-28 DIAGNOSIS — E55.9 VITAMIN D DEFICIENCY, UNSPECIFIED: ICD-10-CM

## 2025-07-28 DIAGNOSIS — Z79.899 HIGH RISK MEDICATION USE: ICD-10-CM

## 2025-08-15 ENCOUNTER — TELEMEDICINE (OUTPATIENT)
Dept: GASTROENTEROLOGY | Age: 55
End: 2025-08-15
Payer: COMMERCIAL

## 2025-08-15 DIAGNOSIS — R11.0 NAUSEA: ICD-10-CM

## 2025-08-15 DIAGNOSIS — K21.9 GASTROESOPHAGEAL REFLUX DISEASE WITHOUT ESOPHAGITIS: ICD-10-CM

## 2025-08-15 DIAGNOSIS — R13.10 DYSPHAGIA, UNSPECIFIED TYPE: Primary | ICD-10-CM

## 2025-08-15 PROCEDURE — 99213 OFFICE O/P EST LOW 20 MIN: CPT | Performed by: INTERNAL MEDICINE

## 2025-08-15 RX ORDER — ONDANSETRON 4 MG/1
4 TABLET, ORALLY DISINTEGRATING ORAL 3 TIMES DAILY PRN
Qty: 180 TABLET | Refills: 0 | Status: SHIPPED | OUTPATIENT
Start: 2025-08-15 | End: 2025-11-13

## 2025-09-03 ENCOUNTER — OFFICE VISIT (OUTPATIENT)
Dept: RHEUMATOLOGY | Age: 55
End: 2025-09-03

## 2025-09-03 VITALS
HEIGHT: 63 IN | SYSTOLIC BLOOD PRESSURE: 154 MMHG | WEIGHT: 195 LBS | DIASTOLIC BLOOD PRESSURE: 91 MMHG | BODY MASS INDEX: 34.55 KG/M2

## 2025-09-03 DIAGNOSIS — G62.9 NEUROPATHY: ICD-10-CM

## 2025-09-03 DIAGNOSIS — F32.0 MAJOR DEPRESSIVE DISORDER, SINGLE EPISODE, MILD: ICD-10-CM

## 2025-09-03 DIAGNOSIS — Z17.0 MALIGNANT NEOPLASM OF BREAST IN FEMALE, ESTROGEN RECEPTOR POSITIVE, UNSPECIFIED LATERALITY, UNSPECIFIED SITE OF BREAST (HCC): ICD-10-CM

## 2025-09-03 DIAGNOSIS — R53.83 OTHER FATIGUE: ICD-10-CM

## 2025-09-03 DIAGNOSIS — M15.9 GENERALIZED OSTEOARTHRITIS: ICD-10-CM

## 2025-09-03 DIAGNOSIS — M54.16 RADICULOPATHY, LUMBAR REGION: ICD-10-CM

## 2025-09-03 DIAGNOSIS — M79.7 FIBROMYALGIA: ICD-10-CM

## 2025-09-03 DIAGNOSIS — M54.12 CERVICAL RADICULOPATHY: ICD-10-CM

## 2025-09-03 DIAGNOSIS — M06.4 INFLAMMATORY POLYARTHRITIS (HCC): Primary | ICD-10-CM

## 2025-09-03 DIAGNOSIS — Z79.52 LONG TERM (CURRENT) USE OF SYSTEMIC STEROIDS: ICD-10-CM

## 2025-09-03 DIAGNOSIS — E55.9 VITAMIN D DEFICIENCY, UNSPECIFIED: ICD-10-CM

## 2025-09-03 DIAGNOSIS — M79.10 MYALGIA, UNSPECIFIED SITE: ICD-10-CM

## 2025-09-03 DIAGNOSIS — C50.919 MALIGNANT NEOPLASM OF BREAST IN FEMALE, ESTROGEN RECEPTOR POSITIVE, UNSPECIFIED LATERALITY, UNSPECIFIED SITE OF BREAST (HCC): ICD-10-CM

## 2025-09-03 RX ORDER — TRAMADOL HYDROCHLORIDE 50 MG/1
50 TABLET ORAL EVERY 8 HOURS PRN
Qty: 90 TABLET | Refills: 0 | Status: CANCELLED | OUTPATIENT
Start: 2025-09-03 | End: 2025-09-08

## 2025-09-03 RX ORDER — DESVENLAFAXINE 50 MG/1
50 TABLET, FILM COATED, EXTENDED RELEASE ORAL DAILY
Qty: 90 TABLET | Refills: 1 | Status: SHIPPED | OUTPATIENT
Start: 2025-09-03

## 2025-09-03 RX ORDER — METHYLPREDNISOLONE SODIUM SUCCINATE 40 MG/ML
40 INJECTION INTRAMUSCULAR; INTRAVENOUS ONCE
Status: COMPLETED | OUTPATIENT
Start: 2025-09-03 | End: 2025-09-03

## 2025-09-03 RX ORDER — PREDNISONE 5 MG/1
TABLET ORAL
Qty: 10 TABLET | Refills: 0 | Status: SHIPPED | OUTPATIENT
Start: 2025-09-03

## 2025-09-03 RX ORDER — ERGOCALCIFEROL 1.25 MG/1
CAPSULE, LIQUID FILLED ORAL
Qty: 12 CAPSULE | Refills: 1 | Status: SHIPPED | OUTPATIENT
Start: 2025-09-03

## 2025-09-03 RX ORDER — GABAPENTIN 300 MG/1
300 CAPSULE ORAL DAILY PRN
Qty: 90 CAPSULE | Refills: 0 | Status: SHIPPED | OUTPATIENT
Start: 2025-09-03 | End: 2026-03-02

## 2025-09-03 RX ADMIN — METHYLPREDNISOLONE SODIUM SUCCINATE 40 MG: 40 INJECTION INTRAMUSCULAR; INTRAVENOUS at 13:18

## 2025-09-03 RX ADMIN — METHYLPREDNISOLONE SODIUM SUCCINATE 40 MG: 40 INJECTION INTRAMUSCULAR; INTRAVENOUS at 13:22

## 2025-09-04 ENCOUNTER — TELEPHONE (OUTPATIENT)
Dept: RHEUMATOLOGY | Age: 55
End: 2025-09-04

## 2025-09-04 DIAGNOSIS — M54.16 RADICULOPATHY, LUMBAR REGION: ICD-10-CM

## 2025-09-04 DIAGNOSIS — Z17.0 MALIGNANT NEOPLASM OF BREAST IN FEMALE, ESTROGEN RECEPTOR POSITIVE, UNSPECIFIED LATERALITY, UNSPECIFIED SITE OF BREAST (HCC): ICD-10-CM

## 2025-09-04 DIAGNOSIS — M79.7 FIBROMYALGIA: ICD-10-CM

## 2025-09-04 DIAGNOSIS — Z79.52 LONG TERM (CURRENT) USE OF SYSTEMIC STEROIDS: ICD-10-CM

## 2025-09-04 DIAGNOSIS — G62.9 NEUROPATHY: ICD-10-CM

## 2025-09-04 DIAGNOSIS — M79.10 MYALGIA, UNSPECIFIED SITE: ICD-10-CM

## 2025-09-04 DIAGNOSIS — Z79.899 HIGH RISK MEDICATION USE: ICD-10-CM

## 2025-09-04 DIAGNOSIS — M15.9 GENERALIZED OSTEOARTHRITIS: ICD-10-CM

## 2025-09-04 DIAGNOSIS — F32.0 MAJOR DEPRESSIVE DISORDER, SINGLE EPISODE, MILD: ICD-10-CM

## 2025-09-04 DIAGNOSIS — Z79.60 LONG TERM CURRENT USE OF IMMUNOSUPPRESSIVE DRUG: ICD-10-CM

## 2025-09-04 DIAGNOSIS — R53.83 OTHER FATIGUE: ICD-10-CM

## 2025-09-04 DIAGNOSIS — M06.4 INFLAMMATORY POLYARTHRITIS (HCC): ICD-10-CM

## 2025-09-04 DIAGNOSIS — C50.919 MALIGNANT NEOPLASM OF BREAST IN FEMALE, ESTROGEN RECEPTOR POSITIVE, UNSPECIFIED LATERALITY, UNSPECIFIED SITE OF BREAST (HCC): ICD-10-CM

## 2025-09-04 DIAGNOSIS — E55.9 VITAMIN D DEFICIENCY, UNSPECIFIED: ICD-10-CM

## 2025-09-05 RX ORDER — TRAMADOL HYDROCHLORIDE 50 MG/1
50 TABLET ORAL EVERY 8 HOURS PRN
Qty: 90 TABLET | Refills: 3 | Status: SHIPPED | OUTPATIENT
Start: 2025-09-05 | End: 2026-01-03

## 2025-09-05 RX ORDER — GABAPENTIN 300 MG/1
300 CAPSULE ORAL EVERY EVENING
Qty: 90 CAPSULE | Refills: 1 | OUTPATIENT
Start: 2025-09-05

## (undated) DEVICE — NEEDLE HYPO 18GA L1.5IN PNK S STL HUB POLYPR SHLD REG BVL

## (undated) DEVICE — Z DISCONTINUED NO SUB SUTURE PLN GUT SZ 5-0 L18IN ABSRB YELLOWISH TAN P-3 L13MM 686G

## (undated) DEVICE — APPLICATOR MEDICATED 26 CC SOLUTION HI LT ORNG CHLORAPREP

## (undated) DEVICE — APPLIER CLP L9.375IN APER 2.1MM CLS L3.8MM 20 SM TI CLP

## (undated) DEVICE — NEEDLE HYPO 21GA L1.5IN INTRAMUSCULAR S STL LATCH BVL UP

## (undated) DEVICE — THE LIPOGRAFTER KIT IS A STERILE, SINGLE USE DISPOSABLE DEVICE THAT IS USED IN HARVESTING, SEDIMENTING, AND TRANSFERRING AUTOLOGOUS FAT TO THE DESIRED ANATOMY.: Brand: LIPOGRAFTER

## (undated) DEVICE — DECANTER FLD 9IN ST BG FOR ASEP TRNSF OF FLD

## (undated) DEVICE — SUTURE PDS II SZ 2-0 L27IN ABSRB VLT SH L26MM 1/2 CIR Z317H

## (undated) DEVICE — UNIVERSAL DRAPES: Brand: MEDLINE INDUSTRIES, INC.

## (undated) DEVICE — AIRLIFE™ OXYGEN TUBING 7 FEET (2.1 M) CRUSH RESISTANT OXYGEN TUBING, VINYL TIPPED: Brand: AIRLIFE™

## (undated) DEVICE — ROUND MODERATE PLUS PROFILE  SALINE BREAST IMPLANT SIZER, 650CC: Brand: MENTOR ROUND MODERATE PLUS PROFILE SINGLE USE SALINE BREAST IMPLANT SIZER

## (undated) DEVICE — FORCEPS BX L240CM JAW DIA2.8MM L CAP W/ NDL MIC MESH TOOTH

## (undated) DEVICE — SUTURE PROL SZ 3-0 L18IN NONABSORBABLE BLU L19MM PS-2 3/8 8687H

## (undated) DEVICE — SYRINGE, LUER LOCK, 60ML: Brand: MEDLINE

## (undated) DEVICE — ILLUMINATOR ELECTROCAUTERY RETRACTED L8IN EXT L11IN DYN 10PK

## (undated) DEVICE — SPONGE GZ W4XL4IN COT 12 PLY TYP VII WVN C FLD DSGN STERILE

## (undated) DEVICE — GLOVE SURG SZ 8 CRM LTX FREE POLYISOPRENE POLYMER BEAD ANTI

## (undated) DEVICE — MINOR SPLIT GENERAL: Brand: MEDLINE INDUSTRIES, INC.

## (undated) DEVICE — BASIC SINGLE BASIN-LF: Brand: MEDLINE INDUSTRIES, INC.

## (undated) DEVICE — STAPLER SKIN SQ 30 ABSRB STPL DISP INSORB

## (undated) DEVICE — BANDAGE,GAUZE,BULKEE II,4.5"X4.1YD,STRL: Brand: MEDLINE

## (undated) DEVICE — SOLUTION PREP PAINT POV IOD FOR SKIN MUCOUS MEM

## (undated) DEVICE — RETRACTOR SURG WIDE FLAT LO PROF LTWT PHOTONGUIDE

## (undated) DEVICE — PREP SKN CHLRAPRP APL 26ML STR --

## (undated) DEVICE — SUTURE ETHILON SZ 3-0 L18IN NONABSORBABLE BLK PS-2 L19MM 3/8 1669H

## (undated) DEVICE — OINTMENT BACITRACIN 1 OZ TUBE --

## (undated) DEVICE — ELECTRODE BLDE L6.5IN CAUT EXT DISP

## (undated) DEVICE — PENCIL ES L3M BTTN SWCH HOLSTER W/ BLDE ELECTRD EDGE

## (undated) DEVICE — SYSTEM IMPL DEL FOR BRST IMPL FUN

## (undated) DEVICE — STAPLER SKIN L39MM DIA0.53MM CRWN 5.7MM S STL FIX HD PROX

## (undated) DEVICE — SUTURE MCRYL SZ 4-0 L27IN ABSRB UD L19MM PS-2 1/2 CIR PRIM Y426H

## (undated) DEVICE — CONTAINER,SPECIMEN,O.R.STRL,4.5OZ: Brand: MEDLINE

## (undated) DEVICE — FUNNEL 2 KELLER

## (undated) DEVICE — KENDALL RADIOLUCENT FOAM MONITORING ELECTRODE RECTANGULAR SHAPE: Brand: KENDALL

## (undated) DEVICE — GAUZE,SPONGE,4"X4",12PLY,WOVEN,NS,LF: Brand: MEDLINE

## (undated) DEVICE — PAD,ABDOMINAL,5"X9",ST,LF,25/BX: Brand: MEDLINE INDUSTRIES, INC.

## (undated) DEVICE — SUTURE ETHILON SZ 2-0 L18IN NONABSORBABLE BLK L26MM PS 3/8 585H

## (undated) DEVICE — MOUTHPIECE ENDOSCP L CTRL OPN AND SIDE PORTS DISP

## (undated) DEVICE — Device

## (undated) DEVICE — CLEANER,CAUTERY TIP,2X2",STERILE: Brand: MEDLINE

## (undated) DEVICE — RESERVOIR,SUCTION,100CC,SILICONE: Brand: MEDLINE

## (undated) DEVICE — CARDINAL HEALTH FLEXIBLE LIGHT HANDLE COVER: Brand: CARDINAL HEALTH

## (undated) DEVICE — NEEDLE HYPO 25GA L1.5IN BLU POLYPR HUB S STL REG BVL STR

## (undated) DEVICE — BUTTON SWITCH PENCIL BLADE ELECTRODE, HOLSTER: Brand: EDGE

## (undated) DEVICE — 3M™ TEGADERM™ TRANSPARENT FILM DRESSING FRAME STYLE, 1624W, 2-3/8 IN X 2-3/4 IN (6 CM X 7 CM), 100/CT 4CT/CASE: Brand: 3M™ TEGADERM™

## (undated) DEVICE — GAUZE,SPONGE,2"X2",8PLY,STERILE,LF,2'S: Brand: MEDLINE

## (undated) DEVICE — SUTURE PLN GUT SZ 5-0 L18IN ABSRB YELLOWISH TAN P-3 L13MM 686G

## (undated) DEVICE — SOLUTION IRRIG 1000ML 0.9% SOD CHL USP POUR PLAS BTL

## (undated) DEVICE — SUTURE MONOCRYL SZ 3-0 L27IN ABSRB UD PS-2 3/8 CIR REV CUT NDL MCP427H

## (undated) DEVICE — BLOCK BITE AD 60FR W/ VELC STRP ADDRESSES MOST PT AND

## (undated) DEVICE — BLADE ELECTRODE: Brand: VALLEYLAB

## (undated) DEVICE — Device: Brand: MEDCO MANUFACTURING INC

## (undated) DEVICE — GUIDEWIRE WITH SPRING TIP - SINGLE USE: Brand: SAFEGUIDE®

## (undated) DEVICE — SPONGE LAPAROTOMY W18XL18IN WHITE STRUNG RADIOPAQUE STERILE

## (undated) DEVICE — INSULATED BLADE ELECTRODE: Brand: EDGE

## (undated) DEVICE — CONNECTOR TBNG OD5-7MM O2 END DISP

## (undated) DEVICE — SUTURE PROL SZ 5-0 L18IN NONABSORBABLE BLU L13MM P-3 3/8 8698G

## (undated) DEVICE — DRAPE TWL SURG 16X26IN BLU ORB04] ALLCARE INC]

## (undated) DEVICE — CANNULA NSL ORAL AD FOR CAPNOFLEX CO2 O2 AIRLFE

## (undated) DEVICE — SUTURE PDS II SZ 3-0 L18IN ABSRB UD PS-1 L24MM 3/8 CIR REV Z683G

## (undated) DEVICE — STAPLER SKIN SQ 30 ABSRB STPL DISP INSORB ORDER VIA PHONE OR EMAIL

## (undated) DEVICE — CONTAINER PREFIL FRMLN 40ML --

## (undated) DEVICE — GLOVE ORANGE PI 7   MSG9070

## (undated) DEVICE — MARKER SURG SKIN UTIL BLK REG TIP NONSMEARING W/ 6IN RUL

## (undated) DEVICE — PAD,NON-ADHERENT,3X8,STERILE,LF,1/PK: Brand: MEDLINE

## (undated) DEVICE — GLOVE SURG SZ 6 THK91MIL LTX FREE SYN POLYISOPRENE ANTI

## (undated) DEVICE — 3M™ TEGADERM™ TRANSPARENT FILM DRESSING FRAME STYLE, 1626W, 4 IN X 4-3/4 IN (10 CM X 12 CM), 50/CT 4CT/CASE: Brand: 3M™ TEGADERM™

## (undated) DEVICE — SUTURE MCRYL SZ 3-0 L27IN ABSRB UD L19MM PS-2 3/8 CIR PRIM Y427H

## (undated) DEVICE — MASTISOL ADHESIVE LIQ 2/3ML

## (undated) DEVICE — AEGIS 1" DISK 4MM HOLE, PEEL OPEN: Brand: MEDLINE

## (undated) DEVICE — DRESSING,GAUZE,XEROFORM,CURAD,5"X9",ST: Brand: CURAD

## (undated) DEVICE — SUTURE MONOCRYL + SZ 4-0 L27IN ABSRB UD L19MM PS-2 3/8 CIR MCP426H

## (undated) DEVICE — ENDOSCOPIC KIT 1.1+ OP4 CA DE 2 GWN AAMI LEVEL 3

## (undated) DEVICE — SPONGE GZ W6XL6IN COT 6 PLY SUP FLUF EXTRA ABSRB FOR PRE OP

## (undated) DEVICE — SOLUTION IV 1000ML 0.9% SOD CHL PH 5 INJ USP VIAFLX PLAS

## (undated) DEVICE — GARMENT,MEDLINE,DVT,INT,CALF,MED, GEN2: Brand: MEDLINE

## (undated) DEVICE — SPONGE LAP W18XL18IN WHT COT 4 PLY FLD STRUNG RADPQ DISP ST 2 PER PACK

## (undated) DEVICE — KIT TISS EXP W/ 60ML SYR 122CM TRNSF SET PIERCING DEV L25CM

## (undated) DEVICE — SUTURE VCRL SZ 2-0 L27IN ABSRB UD L36MM CP-1 1/2 CIR REV J266H

## (undated) DEVICE — SINGLE PORT MANIFOLD: Brand: NEPTUNE 2

## (undated) DEVICE — HIGH PROFILE BOOST, FOR USE WITH SHPB-775: Brand: MENTOR MEMORYGEL BOOST RESTERILIZABLE GEL SIZER

## (undated) DEVICE — DRAIN SURG 15FR SIL RND CHN W/ TRCR FULL FLUT DBL WRP TRAD

## (undated) DEVICE — STRIP,CLOSURE,WOUND,MEDI-STRIP,1/2X4: Brand: MEDLINE

## (undated) DEVICE — SPONGE LAP 18X18IN STRL -- 5/PK

## (undated) DEVICE — PREP SKN POV IOD 10% SOL 4OZ --

## (undated) DEVICE — SUT SLK 3-0 30IN SH BLK --

## (undated) DEVICE — SURGICAL PROCEDURE PACK BASIC ST FRANCIS

## (undated) DEVICE — DISPOSABLE BIOPSY VALVE MAJ-1555: Brand: SINGLE USE BIOPSY VALVE (STERILE)

## (undated) DEVICE — SOLUTION IV 1000ML 0.9% SOD CHL

## (undated) DEVICE — BRA SURG LG 40-42IN WHT LYCRA FR HK AND LOOP CLSR WIDE ADJ

## (undated) DEVICE — SUTURE ETHLN SZ 2-0 L18IN NONABSORBABLE BLK L26MM PS 3/8 585H

## (undated) DEVICE — SYRINGE IRRIG 60ML SFT PLIABLE BLB EZ TO GRP 1 HND USE W/

## (undated) DEVICE — SPONGE GZ W4XL4IN COT 12 PLY TYP VII WVN C FLD DSGN

## (undated) DEVICE — SOLUTION IRRIG 1000ML 09% SOD CHL USP PIC PLAS CONTAINER

## (undated) DEVICE — REM POLYHESIVE ADULT PATIENT RETURN ELECTRODE: Brand: VALLEYLAB

## (undated) DEVICE — CONTAINER SPEC COLLCTN 8 OZ W/ CAP PLAS STRL

## (undated) DEVICE — CONTAINER FORMALIN PREFILLED 10% NBF 60ML